# Patient Record
Sex: MALE | Race: WHITE | NOT HISPANIC OR LATINO | Employment: OTHER | URBAN - METROPOLITAN AREA
[De-identification: names, ages, dates, MRNs, and addresses within clinical notes are randomized per-mention and may not be internally consistent; named-entity substitution may affect disease eponyms.]

---

## 2018-11-01 ENCOUNTER — TRANSCRIBE ORDERS (OUTPATIENT)
Dept: ADMINISTRATIVE | Facility: HOSPITAL | Age: 58
End: 2018-11-01

## 2018-11-01 ENCOUNTER — HOSPITAL ENCOUNTER (OUTPATIENT)
Dept: GENERAL RADIOLOGY | Facility: HOSPITAL | Age: 58
Discharge: HOME | End: 2018-11-01
Attending: FAMILY MEDICINE | Admitting: FAMILY MEDICINE

## 2018-11-01 DIAGNOSIS — I25.10 DISEASE OF CARDIOVASCULAR SYSTEM: Primary | ICD-10-CM

## 2018-11-01 PROCEDURE — 71046 X-RAY EXAM CHEST 2 VIEWS: CPT

## 2020-01-03 ENCOUNTER — TELEPHONE (OUTPATIENT)
Dept: FAMILY MEDICINE CLINIC | Facility: CLINIC | Age: 60
End: 2020-01-03

## 2020-01-03 ENCOUNTER — OFFICE VISIT (OUTPATIENT)
Dept: FAMILY MEDICINE CLINIC | Facility: CLINIC | Age: 60
End: 2020-01-03

## 2020-01-03 VITALS
HEIGHT: 73 IN | WEIGHT: 285 LBS | SYSTOLIC BLOOD PRESSURE: 138 MMHG | TEMPERATURE: 97.4 F | HEART RATE: 90 BPM | DIASTOLIC BLOOD PRESSURE: 72 MMHG | RESPIRATION RATE: 16 BRPM | BODY MASS INDEX: 37.77 KG/M2 | OXYGEN SATURATION: 96 %

## 2020-01-03 DIAGNOSIS — I10 HYPERTENSION, UNSPECIFIED TYPE: ICD-10-CM

## 2020-01-03 DIAGNOSIS — E66.1 CLASS 2 DRUG-INDUCED OBESITY WITH SERIOUS COMORBIDITY AND BODY MASS INDEX (BMI) OF 37.0 TO 37.9 IN ADULT: ICD-10-CM

## 2020-01-03 DIAGNOSIS — E78.5 HYPERLIPIDEMIA, UNSPECIFIED HYPERLIPIDEMIA TYPE: ICD-10-CM

## 2020-01-03 DIAGNOSIS — N32.81 OAB (OVERACTIVE BLADDER): ICD-10-CM

## 2020-01-03 DIAGNOSIS — Z00.00 ENCOUNTER FOR MEDICAL EXAMINATION TO ESTABLISH CARE: Primary | ICD-10-CM

## 2020-01-03 DIAGNOSIS — N40.0 PROSTATE HYPERTROPHY: ICD-10-CM

## 2020-01-03 DIAGNOSIS — Z76.0 MEDICATION REFILL: ICD-10-CM

## 2020-01-03 DIAGNOSIS — E11.9 TYPE 2 DIABETES MELLITUS TREATED WITHOUT INSULIN (HCC): ICD-10-CM

## 2020-01-03 DIAGNOSIS — I51.9 HEART DISEASE: ICD-10-CM

## 2020-01-03 LAB
25(OH)D3+25(OH)D2 SERPL-MCNC: 22.2 NG/ML (ref 30–100)
ALBUMIN SERPL-MCNC: 4.9 G/DL (ref 3.5–5.2)
ALBUMIN/GLOB SERPL: 1.8 G/DL
ALP SERPL-CCNC: 80 U/L (ref 39–117)
ALT SERPL-CCNC: 64 U/L (ref 1–41)
AST SERPL-CCNC: 48 U/L (ref 1–40)
BASOPHILS # BLD AUTO: 0.06 10*3/MM3 (ref 0–0.2)
BASOPHILS NFR BLD AUTO: 1.3 % (ref 0–1.5)
BILIRUB BLD-MCNC: NEGATIVE MG/DL
BILIRUB SERPL-MCNC: 0.7 MG/DL (ref 0.2–1.2)
BUN SERPL-MCNC: 19 MG/DL (ref 8–23)
BUN/CREAT SERPL: 13.7 (ref 7–25)
CALCIUM SERPL-MCNC: 10.3 MG/DL (ref 8.6–10.5)
CHLORIDE SERPL-SCNC: 94 MMOL/L (ref 98–107)
CHOLEST SERPL-MCNC: 186 MG/DL (ref 0–200)
CLARITY, POC: CLEAR
CO2 SERPL-SCNC: 25.6 MMOL/L (ref 22–29)
COLOR UR: YELLOW
CREAT SERPL-MCNC: 1.39 MG/DL (ref 0.76–1.27)
EOSINOPHIL # BLD AUTO: 0.18 10*3/MM3 (ref 0–0.4)
EOSINOPHIL NFR BLD AUTO: 4 % (ref 0.3–6.2)
ERYTHROCYTE [DISTWIDTH] IN BLOOD BY AUTOMATED COUNT: 12.8 % (ref 12.3–15.4)
GLOBULIN SER CALC-MCNC: 2.8 GM/DL
GLUCOSE SERPL-MCNC: 164 MG/DL (ref 65–99)
GLUCOSE UR STRIP-MCNC: NEGATIVE MG/DL
HBA1C MFR BLD: 7.3 % (ref 4.8–5.6)
HCT VFR BLD AUTO: 42.7 % (ref 37.5–51)
HDLC SERPL-MCNC: 35 MG/DL (ref 40–60)
HGB BLD-MCNC: 15.4 G/DL (ref 13–17.7)
IMM GRANULOCYTES # BLD AUTO: 0.02 10*3/MM3 (ref 0–0.05)
IMM GRANULOCYTES NFR BLD AUTO: 0.4 % (ref 0–0.5)
KETONES UR QL: NEGATIVE
LDLC SERPL CALC-MCNC: 88 MG/DL (ref 0–100)
LEUKOCYTE EST, POC: NEGATIVE
LYMPHOCYTES # BLD AUTO: 0.63 10*3/MM3 (ref 0.7–3.1)
LYMPHOCYTES NFR BLD AUTO: 14 % (ref 19.6–45.3)
MCH RBC QN AUTO: 31 PG (ref 26.6–33)
MCHC RBC AUTO-ENTMCNC: 36.1 G/DL (ref 31.5–35.7)
MCV RBC AUTO: 85.9 FL (ref 79–97)
MONOCYTES # BLD AUTO: 0.43 10*3/MM3 (ref 0.1–0.9)
MONOCYTES NFR BLD AUTO: 9.5 % (ref 5–12)
NEUTROPHILS # BLD AUTO: 3.19 10*3/MM3 (ref 1.7–7)
NEUTROPHILS NFR BLD AUTO: 70.8 % (ref 42.7–76)
NITRITE UR-MCNC: NEGATIVE MG/ML
NRBC BLD AUTO-RTO: 0 /100 WBC (ref 0–0.2)
PH UR: 7 [PH] (ref 5–8)
PLATELET # BLD AUTO: 203 10*3/MM3 (ref 140–450)
POTASSIUM SERPL-SCNC: 4.3 MMOL/L (ref 3.5–5.2)
PROT SERPL-MCNC: 7.7 G/DL (ref 6–8.5)
PROT UR STRIP-MCNC: NEGATIVE MG/DL
PSA SERPL-MCNC: 2.56 NG/ML (ref 0–4)
RBC # BLD AUTO: 4.97 10*6/MM3 (ref 4.14–5.8)
RBC # UR STRIP: ABNORMAL /UL
SODIUM SERPL-SCNC: 138 MMOL/L (ref 136–145)
SP GR UR: 1.01 (ref 1–1.03)
TRIGL SERPL-MCNC: 314 MG/DL (ref 0–150)
TSH SERPL DL<=0.005 MIU/L-ACNC: 1.44 UIU/ML (ref 0.27–4.2)
UROBILINOGEN UR QL: NORMAL
VLDLC SERPL CALC-MCNC: 62.8 MG/DL
WBC # BLD AUTO: 4.51 10*3/MM3 (ref 3.4–10.8)

## 2020-01-03 PROCEDURE — 36415 COLL VENOUS BLD VENIPUNCTURE: CPT | Performed by: NURSE PRACTITIONER

## 2020-01-03 PROCEDURE — 99203 OFFICE O/P NEW LOW 30 MIN: CPT | Performed by: NURSE PRACTITIONER

## 2020-01-03 PROCEDURE — 81003 URINALYSIS AUTO W/O SCOPE: CPT | Performed by: NURSE PRACTITIONER

## 2020-01-03 RX ORDER — ATORVASTATIN CALCIUM 40 MG/1
40 TABLET, FILM COATED ORAL NIGHTLY
Qty: 90 TABLET | Refills: 2 | Status: SHIPPED | OUTPATIENT
Start: 2020-01-03 | End: 2020-06-27 | Stop reason: SDUPTHER

## 2020-01-03 RX ORDER — AMLODIPINE AND VALSARTAN 10; 320 MG/1; MG/1
1 TABLET ORAL DAILY
Qty: 90 TABLET | Refills: 2 | Status: SHIPPED | OUTPATIENT
Start: 2020-01-03 | End: 2020-04-10

## 2020-01-03 RX ORDER — TAMSULOSIN HYDROCHLORIDE 0.4 MG/1
1 CAPSULE ORAL DAILY
Qty: 90 CAPSULE | Refills: 2 | Status: SHIPPED | OUTPATIENT
Start: 2020-01-03 | End: 2022-09-14 | Stop reason: SDUPTHER

## 2020-01-03 RX ORDER — OXYBUTYNIN CHLORIDE 10 MG/1
10 TABLET, EXTENDED RELEASE ORAL DAILY
COMMUNITY
End: 2020-01-03 | Stop reason: SDUPTHER

## 2020-01-03 RX ORDER — TAMSULOSIN HYDROCHLORIDE 0.4 MG/1
CAPSULE ORAL
COMMUNITY
End: 2020-01-03 | Stop reason: SDUPTHER

## 2020-01-03 RX ORDER — OXYBUTYNIN CHLORIDE 10 MG/1
10 TABLET, EXTENDED RELEASE ORAL DAILY
Qty: 30 TABLET | Refills: 2 | Status: SHIPPED | OUTPATIENT
Start: 2020-01-03 | End: 2020-04-02

## 2020-01-03 RX ORDER — LANCETS
EACH MISCELLANEOUS
Qty: 100 EACH | Refills: 12 | Status: SHIPPED | OUTPATIENT
Start: 2020-01-03

## 2020-01-03 RX ORDER — BLOOD-GLUCOSE METER
1 KIT MISCELLANEOUS AS NEEDED
Qty: 1 EACH | Refills: 0 | Status: SHIPPED | OUTPATIENT
Start: 2020-01-03

## 2020-01-03 RX ORDER — AMLODIPINE AND VALSARTAN 10; 320 MG/1; MG/1
TABLET ORAL
COMMUNITY
Start: 2019-10-02 | End: 2020-01-03 | Stop reason: SDUPTHER

## 2020-01-03 RX ORDER — PIOGLITAZONEHYDROCHLORIDE 45 MG/1
45 TABLET ORAL DAILY
Qty: 90 TABLET | Refills: 2 | Status: SHIPPED | OUTPATIENT
Start: 2020-01-03 | End: 2020-06-27 | Stop reason: SDUPTHER

## 2020-01-03 RX ORDER — TRIAMTERENE AND HYDROCHLOROTHIAZIDE 75; 50 MG/1; MG/1
TABLET ORAL
COMMUNITY
Start: 2019-10-22 | End: 2020-06-01

## 2020-01-03 RX ORDER — CARVEDILOL 6.25 MG/1
6.25 TABLET ORAL 2 TIMES DAILY
Qty: 180 TABLET | Refills: 2 | Status: SHIPPED | OUTPATIENT
Start: 2020-01-03 | End: 2020-06-27 | Stop reason: SDUPTHER

## 2020-01-03 RX ORDER — GLIMEPIRIDE 4 MG/1
4 TABLET ORAL 2 TIMES DAILY
COMMUNITY
End: 2020-01-03 | Stop reason: SDUPTHER

## 2020-01-03 RX ORDER — CARVEDILOL 6.25 MG/1
6.25 TABLET ORAL 2 TIMES DAILY
COMMUNITY
Start: 2019-12-26 | End: 2020-01-03 | Stop reason: SDUPTHER

## 2020-01-03 RX ORDER — ATORVASTATIN CALCIUM 40 MG/1
TABLET, FILM COATED ORAL
COMMUNITY
End: 2020-01-03 | Stop reason: SDUPTHER

## 2020-01-03 RX ORDER — MULTIVITAMIN WITH IRON
TABLET ORAL
COMMUNITY
End: 2020-06-27

## 2020-01-03 RX ORDER — GLIMEPIRIDE 4 MG/1
4 TABLET ORAL 2 TIMES DAILY
Qty: 180 TABLET | Refills: 2 | Status: SHIPPED | OUTPATIENT
Start: 2020-01-03 | End: 2020-06-27 | Stop reason: SDUPTHER

## 2020-01-03 RX ORDER — GLIMEPIRIDE 4 MG/1
TABLET ORAL
COMMUNITY
End: 2020-01-03

## 2020-01-03 RX ORDER — DIMENHYDRINATE 50 MG
TABLET ORAL
COMMUNITY
End: 2021-02-24

## 2020-01-03 RX ORDER — PIOGLITAZONEHYDROCHLORIDE 45 MG/1
45 TABLET ORAL DAILY
COMMUNITY
End: 2020-01-03 | Stop reason: SDUPTHER

## 2020-01-03 RX ORDER — PIOGLITAZONEHYDROCHLORIDE 30 MG/1
TABLET ORAL
COMMUNITY
End: 2020-01-03

## 2020-01-03 NOTE — PROGRESS NOTES
"Subjective   Omar Can is a 59 y.o. male.   Chief Complaint   Patient presents with   • Establish Care     Pt is needing refills on meds. Pt is fasting for labs.       History of Present Illness   Patient is here to establish care. He is fasting for labs and is needing medication refills.  He has been seeing Dr. Horta. Will need to get records.   He has a hx of HTN, hyperlipidemia, Type 2 DM.   Obesity, sleep Apnea. Prostate hypertrophy, heart disease, OAB.   Patient reports he is taking his current medications. He is stable on his current medications.         The following portions of the patient's history were reviewed and updated as appropriate: allergies, current medications, past family history, past medical history, past social history, past surgical history and problem list.    Review of Systems   Constitutional: Negative.    HENT: Negative.         Ears ringing    Eyes: Negative.         Need to get eyes checked    Respiratory: Negative.    Cardiovascular: Positive for leg swelling.   Gastrointestinal: Negative.      Past Surgical History:   Procedure Laterality Date   • CARDIAC CATHETERIZATION     • CORONARY STENT PLACEMENT     • OTHER SURGICAL HISTORY      defibulator with pacemaker    • PACEMAKER IMPLANTATION  05/29/2019   • REPLACEMENT TOTAL KNEE Left 11/17/2018   • VESICOSTOMY     • WISDOM TOOTH EXTRACTION      3 out 4 removed.      Past Medical History:   Diagnosis Date   • Benign prostatic hyperplasia    • Colon polyps    • Coronary stent patent     x 5 stents    • Hyperlipemia    • Hypertension    • Myocardial infarction (CMS/HCC)    • Obesity    • Prostate hypertrophy    • Sleep apnea    • Type 2 diabetes mellitus (CMS/HCC)        Objective   Allergies   Allergen Reactions   • Cyclobenzaprine Hives   • Flucloxacillin Hives     Visit Vitals  /72   Pulse 90   Temp 97.4 °F (36.3 °C)   Resp 16   Ht 185.4 cm (73\")   Wt 129 kg (285 lb)   SpO2 96%   BMI 37.60 kg/m²       Physical Exam "   Constitutional: He is oriented to person, place, and time. Vital signs are normal. He appears well-developed and well-nourished.   HENT:   Head: Normocephalic.   Right Ear: External ear normal.   Left Ear: External ear normal.   Mouth/Throat: Oropharynx is clear and moist.   Eyes: Pupils are equal, round, and reactive to light.   Neck: Normal range of motion.   Cardiovascular: Normal rate, regular rhythm, normal heart sounds and intact distal pulses.   Pulmonary/Chest: Effort normal and breath sounds normal.   Abdominal: Soft.   Musculoskeletal: Normal range of motion.   Neurological: He is alert and oriented to person, place, and time.   Skin: Skin is warm, dry and intact. Capillary refill takes less than 2 seconds.   Vitals reviewed.      Assessment/Plan   Omar was seen today for establish care.    Diagnoses and all orders for this visit:    Encounter for medical examination to establish care  -     CBC & Differential  -     Comprehensive Metabolic Panel  -     Hemoglobin A1c  -     Lipid Panel  -     POC Urinalysis Dipstick, Automated  -     PSA Screen  -     TSH  -     Vitamin D 25 Hydroxy    Type 2 diabetes mellitus treated without insulin (CMS/[facility])  -     metFORMIN (GLUCOPHAGE) 850 MG tablet; Take 1/2 tablet twice daily  -     pioglitazone (ACTOS) 45 MG tablet; Take 1 tablet by mouth Daily.  -     glimepiride (AMARYL) 4 MG tablet; Take 1 tablet by mouth 2 (Two) Times a Day.  -     glucose blood test strip; Use as instructed  -     Lancets (ONETOUCH ULTRASOFT) lancets; Check daily and prn  -     glucose monitor monitoring kit; 1 each As Needed (for diabetes).  -     Ambulatory Referral for Diabetic Eye Exam-Ophthalmology    Hyperlipidemia, unspecified hyperlipidemia type  -     atorvastatin (LIPITOR) 40 MG tablet; Take 1 tablet by mouth Every Night.    Hypertension, unspecified type  -     carvedilol (COREG) 6.25 MG tablet; Take 1 tablet by mouth 2 (Two) Times a Day.  -     amLODIPine-valsartan (EXFORGE)   MG per tablet; Take 1 tablet by mouth Daily.    Prostate hypertrophy  -     tamsulosin (FLOMAX) 0.4 MG capsule 24 hr capsule; Take 1 capsule by mouth Daily.    Class 2 drug-induced obesity with serious comorbidity and body mass index (BMI) of 37.0 to 37.9 in adult    Heart disease    OAB (overactive bladder)  -     oxybutynin XL (DITROPAN-XL) 10 MG 24 hr tablet; Take 1 tablet by mouth Daily for 90 days.    Medication refill  -     carvedilol (COREG) 6.25 MG tablet; Take 1 tablet by mouth 2 (Two) Times a Day.  -     amLODIPine-valsartan (EXFORGE)  MG per tablet; Take 1 tablet by mouth Daily.  -     metFORMIN (GLUCOPHAGE) 850 MG tablet; Take 1/2 tablet twice daily  -     atorvastatin (LIPITOR) 40 MG tablet; Take 1 tablet by mouth Every Night.  -     pioglitazone (ACTOS) 45 MG tablet; Take 1 tablet by mouth Daily.  -     tamsulosin (FLOMAX) 0.4 MG capsule 24 hr capsule; Take 1 capsule by mouth Daily.  -     glimepiride (AMARYL) 4 MG tablet; Take 1 tablet by mouth 2 (Two) Times a Day.  -     oxybutynin XL (DITROPAN-XL) 10 MG 24 hr tablet; Take 1 tablet by mouth Daily for 90 days.  -     glucose blood test strip; Use as instructed  -     Lancets (ONETOUCH ULTRASOFT) lancets; Check daily and prn      Patient Counseling:  --Nutrition: Stressed importance of moderation in sodium/caffeine intake, saturated fat and cholesterol, caloric balance, sufficient intake of fresh fruits, vegetables, fiber, calcium--Exercise: Stressed the importance of regular exercise.   --Substance Abuse: Discussed cessation/primary prevention of tobacco, alcohol, or other drug use; driving or other dangerous activities under the influence; availability of treatment for abuse.    --Sexuality: Discussed sexually transmitted diseases, partner selection, use of condoms, avoidance of unintended pregnancy and contraceptive alternatives.   --Injury prevention: Discussed safety belts, safety helmets, smoke detector, smoking near bedding or  upholstery.   --Dental health: Discussed importance of regular tooth brushing, flossing, and dental visits.  --Immunizations reviewed. He declined influenza   --Discussed benefits of screening colonoscopy.  --After hours’ service discussed with patient    Follow up in 4 weeks and as needed.          Patient Instructions   Calorie Counting for Weight Loss  Calories are units of energy. Your body needs a certain amount of calories from food to keep you going throughout the day. When you eat more calories than your body needs, your body stores the extra calories as fat. When you eat fewer calories than your body needs, your body burns fat to get the energy it needs.  Calorie counting means keeping track of how many calories you eat and drink each day. Calorie counting can be helpful if you need to lose weight. If you make sure to eat fewer calories than your body needs, you should lose weight. Ask your health care provider what a healthy weight is for you.  For calorie counting to work, you will need to eat the right number of calories in a day in order to lose a healthy amount of weight per week. A dietitian can help you determine how many calories you need in a day and will give you suggestions on how to reach your calorie goal.  · A healthy amount of weight to lose per week is usually 1-2 lb (0.5-0.9 kg). This usually means that your daily calorie intake should be reduced by 500-750 calories.  · Eating 1,200 - 1,500 calories per day can help most women lose weight.  · Eating 1,500 - 1,800 calories per day can help most men lose weight.  What is my plan?  My goal is to have __________ calories per day.  If I have this many calories per day, I should lose around __________ pounds per week.  What do I need to know about calorie counting?  In order to meet your daily calorie goal, you will need to:  · Find out how many calories are in each food you would like to eat. Try to do this before you eat.  · Decide how much of  the food you plan to eat.  · Write down what you ate and how many calories it had. Doing this is called keeping a food log.  To successfully lose weight, it is important to balance calorie counting with a healthy lifestyle that includes regular activity. Aim for 150 minutes of moderate exercise (such as walking) or 75 minutes of vigorous exercise (such as running) each week.  Where do I find calorie information?    The number of calories in a food can be found on a Nutrition Facts label. If a food does not have a Nutrition Facts label, try to look up the calories online or ask your dietitian for help.  Remember that calories are listed per serving. If you choose to have more than one serving of a food, you will have to multiply the calories per serving by the amount of servings you plan to eat. For example, the label on a package of bread might say that a serving size is 1 slice and that there are 90 calories in a serving. If you eat 1 slice, you will have eaten 90 calories. If you eat 2 slices, you will have eaten 180 calories.  How do I keep a food log?  Immediately after each meal, record the following information in your food log:  · What you ate. Don't forget to include toppings, sauces, and other extras on the food.  · How much you ate. This can be measured in cups, ounces, or number of items.  · How many calories each food and drink had.  · The total number of calories in the meal.  Keep your food log near you, such as in a small notebook in your pocket, or use a mobile evelyn or website. Some programs will calculate calories for you and show you how many calories you have left for the day to meet your goal.  What are some calorie counting tips?    · Use your calories on foods and drinks that will fill you up and not leave you hungry:  ? Some examples of foods that fill you up are nuts and nut butters, vegetables, lean proteins, and high-fiber foods like whole grains. High-fiber foods are foods with more than 5  "g fiber per serving.  ? Drinks such as sodas, specialty coffee drinks, alcohol, and juices have a lot of calories, yet do not fill you up.  · Eat nutritious foods and avoid empty calories. Empty calories are calories you get from foods or beverages that do not have many vitamins or protein, such as candy, sweets, and soda. It is better to have a nutritious high-calorie food (such as an avocado) than a food with few nutrients (such as a bag of chips).  · Know how many calories are in the foods you eat most often. This will help you calculate calorie counts faster.  · Pay attention to calories in drinks. Low-calorie drinks include water and unsweetened drinks.  · Pay attention to nutrition labels for \"low fat\" or \"fat free\" foods. These foods sometimes have the same amount of calories or more calories than the full fat versions. They also often have added sugar, starch, or salt, to make up for flavor that was removed with the fat.  · Find a way of tracking calories that works for you. Get creative. Try different apps or programs if writing down calories does not work for you.  What are some portion control tips?  · Know how many calories are in a serving. This will help you know how many servings of a certain food you can have.  · Use a measuring cup to measure serving sizes. You could also try weighing out portions on a kitchen scale. With time, you will be able to estimate serving sizes for some foods.  · Take some time to put servings of different foods on your favorite plates, bowls, and cups so you know what a serving looks like.  · Try not to eat straight from a bag or box. Doing this can lead to overeating. Put the amount you would like to eat in a cup or on a plate to make sure you are eating the right portion.  · Use smaller plates, glasses, and bowls to prevent overeating.  · Try not to multitask (for example, watch TV or use your computer) while eating. If it is time to eat, sit down at a table and enjoy " "your food. This will help you to know when you are full. It will also help you to be aware of what you are eating and how much you are eating.  What are tips for following this plan?  Reading food labels  · Check the calorie count compared to the serving size. The serving size may be smaller than what you are used to eating.  · Check the source of the calories. Make sure the food you are eating is high in vitamins and protein and low in saturated and trans fats.  Shopping  · Read nutrition labels while you shop. This will help you make healthy decisions before you decide to purchase your food.  · Make a grocery list and stick to it.  Cooking  · Try to cook your favorite foods in a healthier way. For example, try baking instead of frying.  · Use low-fat dairy products.  Meal planning  · Use more fruits and vegetables. Half of your plate should be fruits and vegetables.  · Include lean proteins like poultry and fish.  How do I count calories when eating out?  · Ask for smaller portion sizes.  · Consider sharing an entree and sides instead of getting your own entree.  · If you get your own entree, eat only half. Ask for a box at the beginning of your meal and put the rest of your entree in it so you are not tempted to eat it.  · If calories are listed on the menu, choose the lower calorie options.  · Choose dishes that include vegetables, fruits, whole grains, low-fat dairy products, and lean protein.  · Choose items that are boiled, broiled, grilled, or steamed. Stay away from items that are buttered, battered, fried, or served with cream sauce. Items labeled \"crispy\" are usually fried, unless stated otherwise.  · Choose water, low-fat milk, unsweetened iced tea, or other drinks without added sugar. If you want an alcoholic beverage, choose a lower calorie option such as a glass of wine or light beer.  · Ask for dressings, sauces, and syrups on the side. These are usually high in calories, so you should limit the " amount you eat.  · If you want a salad, choose a garden salad and ask for grilled meats. Avoid extra toppings like zimmer, cheese, or fried items. Ask for the dressing on the side, or ask for olive oil and vinegar or lemon to use as dressing.  · Estimate how many servings of a food you are given. For example, a serving of cooked rice is ½ cup or about the size of half a baseball. Knowing serving sizes will help you be aware of how much food you are eating at restaurants. The list below tells you how big or small some common portion sizes are based on everyday objects:  ? 1 oz--4 stacked dice.  ? 3 oz--1 deck of cards.  ? 1 tsp--1 die.  ? 1 Tbsp--½ a ping-pong ball.  ? 2 Tbsp--1 ping-pong ball.  ? ½ cup--½ baseball.  ? 1 cup--1 baseball.  Summary  · Calorie counting means keeping track of how many calories you eat and drink each day. If you eat fewer calories than your body needs, you should lose weight.  · A healthy amount of weight to lose per week is usually 1-2 lb (0.5-0.9 kg). This usually means reducing your daily calorie intake by 500-750 calories.  · The number of calories in a food can be found on a Nutrition Facts label. If a food does not have a Nutrition Facts label, try to look up the calories online or ask your dietitian for help.  · Use your calories on foods and drinks that will fill you up, and not on foods and drinks that will leave you hungry.  · Use smaller plates, glasses, and bowls to prevent overeating.  This information is not intended to replace advice given to you by your health care provider. Make sure you discuss any questions you have with your health care provider.  Document Released: 12/18/2006 Document Revised: 09/06/2019 Document Reviewed: 11/17/2017  [employer] Interactive Patient Education © 2019 [employer]    High Triglycerides Eating Plan  Triglycerides are a type of fat in the blood. High levels of triglycerides can increase your risk of heart disease and stroke. If your  triglyceride levels are high, choosing the right foods can help lower your triglycerides and keep your heart healthy. Work with your health care provider or a diet and nutrition specialist (dietitian) to develop an eating plan that is right for you.  What are tips for following this plan?  General guidelines    · Lose weight, if you are overweight. For most people, losing 5-10 lbs (2-5 kg) helps lower triglyceride levels. A weight-loss plan may include.  ? 30 minutes of exercise at least 5 days a week.  ? Reducing the amount of calories, sugar, and fat you eat.  · Eat a wide variety of fresh fruits, vegetables, and whole grains. These foods are high in fiber.  · Eat foods that contain healthy fats, such as fatty fish, nuts, seeds, and olive oil.  · Avoid foods that are high in added sugar, added salt (sodium), saturated fat, and trans fat.  · Avoid low-fiber, refined carbohydrates such as white bread, crackers, noodles, and white rice.  · Avoid foods with partially hydrogenated oils (trans fats), such as fried foods or stick margarine.  · Limit alcohol intake to no more than 1 drink a day for nonpregnant women and 2 drinks a day for men. One drink equals 12 oz of beer, 5 oz of wine, or 1½ oz of hard liquor. Your health care provider may recommend that you drink less depending on your overall health.  Reading food labels  · Check food labels for the amount of saturated fat. Choose foods with no or very little saturated fat.  · Check food labels for the amount of trans fat. Choose foods with no trans fat.  · Check food labels for the amount of cholesterol. Choose foods low in cholesterol. Ask your dietitian how much cholesterol you should have each day.  · Check food labels for the amount of sodium. Choose foods with less than 140 milligrams (mg) per serving.  Shopping  · Buy dairy products labeled as nonfat (skim) or low-fat (1%).  · Avoid buying processed or prepackaged foods. These are often high in added sugar,  sodium, and fat.  Cooking  · Choose healthy fats when cooking, such as olive oil or canola oil.  · Cook foods using lower fat methods, such as baking, broiling, boiling, or grilling.  · Make your own sauces, dressings, and marinades when possible, instead of buying them. Store-bought sauces, dressings, and marinades are often high in sodium and sugar.  Meal planning  · Eat more home-cooked food and less restaurant, buffet, and fast food.  · Eat fatty fish at least 2 times each week. Examples of fatty fish include salmon, trout, mackerel, tuna, and herring.  · If you eat whole eggs, do not eat more than 3 egg yolks per week.  What foods are recommended?  The items listed may not be a complete list. Talk with your dietitian about what dietary choices are best for you.  Grains  Whole wheat or whole grain breads, crackers, cereals, and pasta. Unsweetened oatmeal. Bulgur. Barley. Quinoa. Brown rice. Whole wheat flour tortillas.  Vegetables  Fresh or frozen vegetables. Low-sodium canned vegetables.  Fruits  All fresh, canned (in natural juice), or frozen fruits.  Meats and other protein foods  Skinless chicken or turkey. Ground chicken or turkey. Lean cuts of pork, trimmed of fat. Fish and seafood, especially salmon, trout, and herring. Egg whites. Dried beans, peas, or lentils. Unsalted nuts or seeds. Unsalted canned beans. Natural peanut or almond butter.  Dairy  Low-fat dairy products. Skim or low-fat (1%) milk. Reduced fat (2%) and low-sodium cheese. Low-fat ricotta cheese. Low-fat cottage cheese. Plain, low-fat yogurt.  Fats and oils  Tub margarine without trans fats. Light or reduced-fat mayonnaise. Light or reduced-fat salad dressings. Avocado. Safflower, olive, sunflower, soybean, and canola oils.  What foods are not recommended?  The items listed may not be a complete list. Talk with your dietitian about what dietary choices are best for you.  Grains  White bread. White (regular) pasta. White rice. Cornbread.  Bagels. Pastries. Crackers that contain trans fat.  Vegetables  Creamed or fried vegetables. Vegetables in a cheese sauce.  Fruits  Sweetened dried fruit. Canned fruit in syrup. Fruit juice.  Meats and other protein foods  Fatty cuts of meat. Ribs. Chicken wings. Miller. Sausage. Bologna. Salami. Chitterlings. Fatback. Hot dogs. Bratwurst. Packaged lunch meats.  Dairy  Whole or reduced-fat (2%) milk. Half-and-half. Cream cheese. Full-fat or sweetened yogurt. Full-fat cheese. Nondairy creamers. Whipped toppings. Processed cheese or cheese spreads. Cheese curds.  Beverages  Alcohol. Sweetened drinks, such as soda, lemonade, fruit drinks, or punches.  Fats and oils  Butter. Stick margarine. Lard. Shortening. Ghee. Millre fat. Tropical oils, such as coconut, palm kernel, or palm oils.  Sweets and desserts  Corn syrup. Sugars. Honey. Molasses. Candy. Jam and jelly. Syrup. Sweetened cereals. Cookies. Pies. Cakes. Donuts. Muffins. Ice cream.  Condiments  Store-bought sauces, dressings, and marinades that are high in sugar, such as ketchup and barbecue sauce.  Summary  · High levels of triglycerides can increase the risk of heart disease and stroke. Choosing the right foods can help lower your triglycerides.  · Eat plenty of fresh fruits, vegetables, and whole grains. Choose low-fat dairy and lean meats. Eat fatty fish at least twice a week.  · Avoid processed and prepackaged foods with added sugar, sodium, saturated fat, and trans fat.  · If you need suggestions or have questions about what types of food are good for you, talk with your health care provider or a dietitian.  This information is not intended to replace advice given to you by your health care provider. Make sure you discuss any questions you have with your health care provider.  Document Released: 10/05/2005 Document Revised: 02/20/2018 Document Reviewed: 02/20/2018  [employer] Interactive Patient Education © 2019 [employer]    Hypertension  Hypertension is  another name for high blood pressure. High blood pressure forces your heart to work harder to pump blood. This can cause problems over time.  There are two numbers in a blood pressure reading. There is a top number (systolic) over a bottom number (diastolic). It is best to have a blood pressure below 120/80. Healthy choices can help lower your blood pressure. You may need medicine to help lower your blood pressure if:  · Your blood pressure cannot be lowered with healthy choices.  · Your blood pressure is higher than 130/80.  Follow these instructions at home:  Eating and drinking    · If directed, follow the DASH eating plan. This diet includes:  ? Filling half of your plate at each meal with fruits and vegetables.  ? Filling one quarter of your plate at each meal with whole grains. Whole grains include whole wheat pasta, brown rice, and whole grain bread.  ? Eating or drinking low-fat dairy products, such as skim milk or low-fat yogurt.  ? Filling one quarter of your plate at each meal with low-fat (lean) proteins. Low-fat proteins include fish, skinless chicken, eggs, beans, and tofu.  ? Avoiding fatty meat, cured and processed meat, or chicken with skin.  ? Avoiding premade or processed food.  · Eat less than 1,500 mg of salt (sodium) a day.  · Limit alcohol use to no more than 1 drink a day for nonpregnant women and 2 drinks a day for men. One drink equals 12 oz of beer, 5 oz of wine, or 1½ oz of hard liquor.  Lifestyle  · Work with your doctor to stay at a healthy weight or to lose weight. Ask your doctor what the best weight is for you.  · Get at least 30 minutes of exercise that causes your heart to beat faster (aerobic exercise) most days of the week. This may include walking, swimming, or biking.  · Get at least 30 minutes of exercise that strengthens your muscles (resistance exercise) at least 3 days a week. This may include lifting weights or pilates.  · Do not use any products that contain nicotine or  tobacco. This includes cigarettes and e-cigarettes. If you need help quitting, ask your doctor.  · Check your blood pressure at home as told by your doctor.  · Keep all follow-up visits as told by your doctor. This is important.  Medicines  · Take over-the-counter and prescription medicines only as told by your doctor. Follow directions carefully.  · Do not skip doses of blood pressure medicine. The medicine does not work as well if you skip doses. Skipping doses also puts you at risk for problems.  · Ask your doctor about side effects or reactions to medicines that you should watch for.  Contact a doctor if:  · You think you are having a reaction to the medicine you are taking.  · You have headaches that keep coming back (recurring).  · You feel dizzy.  · You have swelling in your ankles.  · You have trouble with your vision.  Get help right away if:  · You get a very bad headache.  · You start to feel confused.  · You feel weak or numb.  · You feel faint.  · You get very bad pain in your:  ? Chest.  ? Belly (abdomen).  · You throw up (vomit) more than once.  · You have trouble breathing.  Summary  · Hypertension is another name for high blood pressure.  · Making healthy choices can help lower blood pressure. If your blood pressure cannot be controlled with healthy choices, you may need to take medicine.  This information is not intended to replace advice given to you by your health care provider. Make sure you discuss any questions you have with your health care provider.  Document Released: 06/05/2009 Document Revised: 11/15/2017 Document Reviewed: 11/15/2017  [employer] Interactive Patient Education © 2019 [employer].     Answers for HPI/ROS submitted by the patient on 1/1/2020   How long have you been having these symptoms?: Other      Cristina Davis, APRN

## 2020-01-03 NOTE — TELEPHONE ENCOUNTER
[facility] called to request clarification on directions as there aren't any on the glucose monitor monitoring kit Rx.    [facility] Callback # [phone]

## 2020-01-03 NOTE — PATIENT INSTRUCTIONS
Calorie Counting for Weight Loss  Calories are units of energy. Your body needs a certain amount of calories from food to keep you going throughout the day. When you eat more calories than your body needs, your body stores the extra calories as fat. When you eat fewer calories than your body needs, your body burns fat to get the energy it needs.  Calorie counting means keeping track of how many calories you eat and drink each day. Calorie counting can be helpful if you need to lose weight. If you make sure to eat fewer calories than your body needs, you should lose weight. Ask your health care provider what a healthy weight is for you.  For calorie counting to work, you will need to eat the right number of calories in a day in order to lose a healthy amount of weight per week. A dietitian can help you determine how many calories you need in a day and will give you suggestions on how to reach your calorie goal.  · A healthy amount of weight to lose per week is usually 1-2 lb (0.5-0.9 kg). This usually means that your daily calorie intake should be reduced by 500-750 calories.  · Eating 1,200 - 1,500 calories per day can help most women lose weight.  · Eating 1,500 - 1,800 calories per day can help most men lose weight.  What is my plan?  My goal is to have __________ calories per day.  If I have this many calories per day, I should lose around __________ pounds per week.  What do I need to know about calorie counting?  In order to meet your daily calorie goal, you will need to:  · Find out how many calories are in each food you would like to eat. Try to do this before you eat.  · Decide how much of the food you plan to eat.  · Write down what you ate and how many calories it had. Doing this is called keeping a food log.  To successfully lose weight, it is important to balance calorie counting with a healthy lifestyle that includes regular activity. Aim for 150 minutes of moderate exercise (such as walking) or 75  minutes of vigorous exercise (such as running) each week.  Where do I find calorie information?    The number of calories in a food can be found on a Nutrition Facts label. If a food does not have a Nutrition Facts label, try to look up the calories online or ask your dietitian for help.  Remember that calories are listed per serving. If you choose to have more than one serving of a food, you will have to multiply the calories per serving by the amount of servings you plan to eat. For example, the label on a package of bread might say that a serving size is 1 slice and that there are 90 calories in a serving. If you eat 1 slice, you will have eaten 90 calories. If you eat 2 slices, you will have eaten 180 calories.  How do I keep a food log?  Immediately after each meal, record the following information in your food log:  · What you ate. Don't forget to include toppings, sauces, and other extras on the food.  · How much you ate. This can be measured in cups, ounces, or number of items.  · How many calories each food and drink had.  · The total number of calories in the meal.  Keep your food log near you, such as in a small notebook in your pocket, or use a mobile evelyn or website. Some programs will calculate calories for you and show you how many calories you have left for the day to meet your goal.  What are some calorie counting tips?    · Use your calories on foods and drinks that will fill you up and not leave you hungry:  ? Some examples of foods that fill you up are nuts and nut butters, vegetables, lean proteins, and high-fiber foods like whole grains. High-fiber foods are foods with more than 5 g fiber per serving.  ? Drinks such as sodas, specialty coffee drinks, alcohol, and juices have a lot of calories, yet do not fill you up.  · Eat nutritious foods and avoid empty calories. Empty calories are calories you get from foods or beverages that do not have many vitamins or protein, such as candy, sweets, and  "soda. It is better to have a nutritious high-calorie food (such as an avocado) than a food with few nutrients (such as a bag of chips).  · Know how many calories are in the foods you eat most often. This will help you calculate calorie counts faster.  · Pay attention to calories in drinks. Low-calorie drinks include water and unsweetened drinks.  · Pay attention to nutrition labels for \"low fat\" or \"fat free\" foods. These foods sometimes have the same amount of calories or more calories than the full fat versions. They also often have added sugar, starch, or salt, to make up for flavor that was removed with the fat.  · Find a way of tracking calories that works for you. Get creative. Try different apps or programs if writing down calories does not work for you.  What are some portion control tips?  · Know how many calories are in a serving. This will help you know how many servings of a certain food you can have.  · Use a measuring cup to measure serving sizes. You could also try weighing out portions on a kitchen scale. With time, you will be able to estimate serving sizes for some foods.  · Take some time to put servings of different foods on your favorite plates, bowls, and cups so you know what a serving looks like.  · Try not to eat straight from a bag or box. Doing this can lead to overeating. Put the amount you would like to eat in a cup or on a plate to make sure you are eating the right portion.  · Use smaller plates, glasses, and bowls to prevent overeating.  · Try not to multitask (for example, watch TV or use your computer) while eating. If it is time to eat, sit down at a table and enjoy your food. This will help you to know when you are full. It will also help you to be aware of what you are eating and how much you are eating.  What are tips for following this plan?  Reading food labels  · Check the calorie count compared to the serving size. The serving size may be smaller than what you are used to " "eating.  · Check the source of the calories. Make sure the food you are eating is high in vitamins and protein and low in saturated and trans fats.  Shopping  · Read nutrition labels while you shop. This will help you make healthy decisions before you decide to purchase your food.  · Make a grocery list and stick to it.  Cooking  · Try to cook your favorite foods in a healthier way. For example, try baking instead of frying.  · Use low-fat dairy products.  Meal planning  · Use more fruits and vegetables. Half of your plate should be fruits and vegetables.  · Include lean proteins like poultry and fish.  How do I count calories when eating out?  · Ask for smaller portion sizes.  · Consider sharing an entree and sides instead of getting your own entree.  · If you get your own entree, eat only half. Ask for a box at the beginning of your meal and put the rest of your entree in it so you are not tempted to eat it.  · If calories are listed on the menu, choose the lower calorie options.  · Choose dishes that include vegetables, fruits, whole grains, low-fat dairy products, and lean protein.  · Choose items that are boiled, broiled, grilled, or steamed. Stay away from items that are buttered, battered, fried, or served with cream sauce. Items labeled \"crispy\" are usually fried, unless stated otherwise.  · Choose water, low-fat milk, unsweetened iced tea, or other drinks without added sugar. If you want an alcoholic beverage, choose a lower calorie option such as a glass of wine or light beer.  · Ask for dressings, sauces, and syrups on the side. These are usually high in calories, so you should limit the amount you eat.  · If you want a salad, choose a garden salad and ask for grilled meats. Avoid extra toppings like zimmer, cheese, or fried items. Ask for the dressing on the side, or ask for olive oil and vinegar or lemon to use as dressing.  · Estimate how many servings of a food you are given. For example, a serving of " cooked rice is ½ cup or about the size of half a baseball. Knowing serving sizes will help you be aware of how much food you are eating at restaurants. The list below tells you how big or small some common portion sizes are based on everyday objects:  ? 1 oz--4 stacked dice.  ? 3 oz--1 deck of cards.  ? 1 tsp--1 die.  ? 1 Tbsp--½ a ping-pong ball.  ? 2 Tbsp--1 ping-pong ball.  ? ½ cup--½ baseball.  ? 1 cup--1 baseball.  Summary  · Calorie counting means keeping track of how many calories you eat and drink each day. If you eat fewer calories than your body needs, you should lose weight.  · A healthy amount of weight to lose per week is usually 1-2 lb (0.5-0.9 kg). This usually means reducing your daily calorie intake by 500-750 calories.  · The number of calories in a food can be found on a Nutrition Facts label. If a food does not have a Nutrition Facts label, try to look up the calories online or ask your dietitian for help.  · Use your calories on foods and drinks that will fill you up, and not on foods and drinks that will leave you hungry.  · Use smaller plates, glasses, and bowls to prevent overeating.  This information is not intended to replace advice given to you by your health care provider. Make sure you discuss any questions you have with your health care provider.  Document Released: 12/18/2006 Document Revised: 09/06/2019 Document Reviewed: 11/17/2017  [employer] Interactive Patient Education © 2019 [employer]    High Triglycerides Eating Plan  Triglycerides are a type of fat in the blood. High levels of triglycerides can increase your risk of heart disease and stroke. If your triglyceride levels are high, choosing the right foods can help lower your triglycerides and keep your heart healthy. Work with your health care provider or a diet and nutrition specialist (dietitian) to develop an eating plan that is right for you.  What are tips for following this plan?  General guidelines    · Lose weight, if  you are overweight. For most people, losing 5-10 lbs (2-5 kg) helps lower triglyceride levels. A weight-loss plan may include.  ? 30 minutes of exercise at least 5 days a week.  ? Reducing the amount of calories, sugar, and fat you eat.  · Eat a wide variety of fresh fruits, vegetables, and whole grains. These foods are high in fiber.  · Eat foods that contain healthy fats, such as fatty fish, nuts, seeds, and olive oil.  · Avoid foods that are high in added sugar, added salt (sodium), saturated fat, and trans fat.  · Avoid low-fiber, refined carbohydrates such as white bread, crackers, noodles, and white rice.  · Avoid foods with partially hydrogenated oils (trans fats), such as fried foods or stick margarine.  · Limit alcohol intake to no more than 1 drink a day for nonpregnant women and 2 drinks a day for men. One drink equals 12 oz of beer, 5 oz of wine, or 1½ oz of hard liquor. Your health care provider may recommend that you drink less depending on your overall health.  Reading food labels  · Check food labels for the amount of saturated fat. Choose foods with no or very little saturated fat.  · Check food labels for the amount of trans fat. Choose foods with no trans fat.  · Check food labels for the amount of cholesterol. Choose foods low in cholesterol. Ask your dietitian how much cholesterol you should have each day.  · Check food labels for the amount of sodium. Choose foods with less than 140 milligrams (mg) per serving.  Shopping  · Buy dairy products labeled as nonfat (skim) or low-fat (1%).  · Avoid buying processed or prepackaged foods. These are often high in added sugar, sodium, and fat.  Cooking  · Choose healthy fats when cooking, such as olive oil or canola oil.  · Cook foods using lower fat methods, such as baking, broiling, boiling, or grilling.  · Make your own sauces, dressings, and marinades when possible, instead of buying them. Store-bought sauces, dressings, and marinades are often high  in sodium and sugar.  Meal planning  · Eat more home-cooked food and less restaurant, buffet, and fast food.  · Eat fatty fish at least 2 times each week. Examples of fatty fish include salmon, trout, mackerel, tuna, and herring.  · If you eat whole eggs, do not eat more than 3 egg yolks per week.  What foods are recommended?  The items listed may not be a complete list. Talk with your dietitian about what dietary choices are best for you.  Grains  Whole wheat or whole grain breads, crackers, cereals, and pasta. Unsweetened oatmeal. Bulgur. Barley. Quinoa. Brown rice. Whole wheat flour tortillas.  Vegetables  Fresh or frozen vegetables. Low-sodium canned vegetables.  Fruits  All fresh, canned (in natural juice), or frozen fruits.  Meats and other protein foods  Skinless chicken or turkey. Ground chicken or turkey. Lean cuts of pork, trimmed of fat. Fish and seafood, especially salmon, trout, and herring. Egg whites. Dried beans, peas, or lentils. Unsalted nuts or seeds. Unsalted canned beans. Natural peanut or almond butter.  Dairy  Low-fat dairy products. Skim or low-fat (1%) milk. Reduced fat (2%) and low-sodium cheese. Low-fat ricotta cheese. Low-fat cottage cheese. Plain, low-fat yogurt.  Fats and oils  Tub margarine without trans fats. Light or reduced-fat mayonnaise. Light or reduced-fat salad dressings. Avocado. Safflower, olive, sunflower, soybean, and canola oils.  What foods are not recommended?  The items listed may not be a complete list. Talk with your dietitian about what dietary choices are best for you.  Grains  White bread. White (regular) pasta. White rice. Cornbread. Bagels. Pastries. Crackers that contain trans fat.  Vegetables  Creamed or fried vegetables. Vegetables in a cheese sauce.  Fruits  Sweetened dried fruit. Canned fruit in syrup. Fruit juice.  Meats and other protein foods  Fatty cuts of meat. Ribs. Chicken wings. Miller. Sausage. Bologna. Salami. Chitterlings. Fatback. Hot dogs.  [place]urst. Packaged lunch meats.  Dairy  Whole or reduced-fat (2%) milk. Half-and-half. Cream cheese. Full-fat or sweetened yogurt. Full-fat cheese. Nondairy creamers. Whipped toppings. Processed cheese or cheese spreads. Cheese curds.  Beverages  Alcohol. Sweetened drinks, such as soda, lemonade, fruit drinks, or punches.  Fats and oils  Butter. Stick margarine. Lard. Shortening. Ghee. Miller fat. Tropical oils, such as coconut, palm kernel, or palm oils.  Sweets and desserts  Corn syrup. Sugars. Honey. Molasses. Candy. Jam and jelly. Syrup. Sweetened cereals. Cookies. Pies. Cakes. Donuts. Muffins. Ice cream.  Condiments  Store-bought sauces, dressings, and marinades that are high in sugar, such as ketchup and barbecue sauce.  Summary  · High levels of triglycerides can increase the risk of heart disease and stroke. Choosing the right foods can help lower your triglycerides.  · Eat plenty of fresh fruits, vegetables, and whole grains. Choose low-fat dairy and lean meats. Eat fatty fish at least twice a week.  · Avoid processed and prepackaged foods with added sugar, sodium, saturated fat, and trans fat.  · If you need suggestions or have questions about what types of food are good for you, talk with your health care provider or a dietitian.  This information is not intended to replace advice given to you by your health care provider. Make sure you discuss any questions you have with your health care provider.  Document Released: 10/05/2005 Document Revised: 02/20/2018 Document Reviewed: 02/20/2018  [employer] Interactive Patient Education © 2019 [employer]    Hypertension  Hypertension is another name for high blood pressure. High blood pressure forces your heart to work harder to pump blood. This can cause problems over time.  There are two numbers in a blood pressure reading. There is a top number (systolic) over a bottom number (diastolic). It is best to have a blood pressure below 120/80. Healthy choices can  help lower your blood pressure. You may need medicine to help lower your blood pressure if:  · Your blood pressure cannot be lowered with healthy choices.  · Your blood pressure is higher than 130/80.  Follow these instructions at home:  Eating and drinking    · If directed, follow the DASH eating plan. This diet includes:  ? Filling half of your plate at each meal with fruits and vegetables.  ? Filling one quarter of your plate at each meal with whole grains. Whole grains include whole wheat pasta, brown rice, and whole grain bread.  ? Eating or drinking low-fat dairy products, such as skim milk or low-fat yogurt.  ? Filling one quarter of your plate at each meal with low-fat (lean) proteins. Low-fat proteins include fish, skinless chicken, eggs, beans, and tofu.  ? Avoiding fatty meat, cured and processed meat, or chicken with skin.  ? Avoiding premade or processed food.  · Eat less than 1,500 mg of salt (sodium) a day.  · Limit alcohol use to no more than 1 drink a day for nonpregnant women and 2 drinks a day for men. One drink equals 12 oz of beer, 5 oz of wine, or 1½ oz of hard liquor.  Lifestyle  · Work with your doctor to stay at a healthy weight or to lose weight. Ask your doctor what the best weight is for you.  · Get at least 30 minutes of exercise that causes your heart to beat faster (aerobic exercise) most days of the week. This may include walking, swimming, or biking.  · Get at least 30 minutes of exercise that strengthens your muscles (resistance exercise) at least 3 days a week. This may include lifting weights or pilates.  · Do not use any products that contain nicotine or tobacco. This includes cigarettes and e-cigarettes. If you need help quitting, ask your doctor.  · Check your blood pressure at home as told by your doctor.  · Keep all follow-up visits as told by your doctor. This is important.  Medicines  · Take over-the-counter and prescription medicines only as told by your doctor. Follow  directions carefully.  · Do not skip doses of blood pressure medicine. The medicine does not work as well if you skip doses. Skipping doses also puts you at risk for problems.  · Ask your doctor about side effects or reactions to medicines that you should watch for.  Contact a doctor if:  · You think you are having a reaction to the medicine you are taking.  · You have headaches that keep coming back (recurring).  · You feel dizzy.  · You have swelling in your ankles.  · You have trouble with your vision.  Get help right away if:  · You get a very bad headache.  · You start to feel confused.  · You feel weak or numb.  · You feel faint.  · You get very bad pain in your:  ? Chest.  ? Belly (abdomen).  · You throw up (vomit) more than once.  · You have trouble breathing.  Summary  · Hypertension is another name for high blood pressure.  · Making healthy choices can help lower blood pressure. If your blood pressure cannot be controlled with healthy choices, you may need to take medicine.  This information is not intended to replace advice given to you by your health care provider. Make sure you discuss any questions you have with your health care provider.  Document Released: 06/05/2009 Document Revised: 11/15/2017 Document Reviewed: 11/15/2017  [employer] Interactive Patient Education © 2019 [employer].

## 2020-01-24 ENCOUNTER — TELEPHONE (OUTPATIENT)
Dept: FAMILY MEDICINE CLINIC | Facility: CLINIC | Age: 60
End: 2020-01-24

## 2020-01-24 NOTE — TELEPHONE ENCOUNTER
Pharmacy called needing to get clarification on the frequency of the glucose blood test strip.    [facility] [address]

## 2020-04-10 ENCOUNTER — TELEPHONE (OUTPATIENT)
Dept: FAMILY MEDICINE CLINIC | Facility: CLINIC | Age: 60
End: 2020-04-10

## 2020-04-10 DIAGNOSIS — I10 ESSENTIAL HYPERTENSION: Primary | ICD-10-CM

## 2020-04-10 RX ORDER — VALSARTAN 320 MG/1
320 TABLET ORAL DAILY
Qty: 90 TABLET | Refills: 1 | Status: SHIPPED | OUTPATIENT
Start: 2020-04-10 | End: 2020-06-01

## 2020-04-10 RX ORDER — AMLODIPINE BESYLATE 10 MG/1
10 TABLET ORAL DAILY
Qty: 90 TABLET | Refills: 1 | Status: SHIPPED | OUTPATIENT
Start: 2020-04-10 | End: 2020-06-01

## 2020-04-10 NOTE — TELEPHONE ENCOUNTER
Called patient discussed his medication. He received a letter from his insurance company they were not longer going to cover the Exforge medication.   He is agreeable to try to spilt them up and reorder as two individual medications   Will stop the Exforge.   Will order amlodipine 10 mg daily   And valsartan 320 mg daily   Follow up as planned.     BRIDGET Argueta         ----- Message from Lizy Ramirez MA sent at 4/9/2020  8:51 AM EDT -----  Regarding: FW: amLODIPine-valsartan (EXFORGE)  MG refill need new medication type.    Contact: [phone]      ----- Message -----  From: Jacey Lincoln  Sent: 4/9/2020   8:20 AM EDT  To: Nick Schmidt [facility]  Subject: amLODIPine-valsartan (EXFORGE)  MG ref#    Patient said that his insurance does not pay for amLODIPine-valsartan (EXFORGE)  MG any more and would need a similar medication and has to be changed. Please call and discuss the medication change.       Thank you

## 2020-05-29 DIAGNOSIS — I10 HYPERTENSION, UNSPECIFIED TYPE: ICD-10-CM

## 2020-05-29 DIAGNOSIS — Z76.0 MEDICATION REFILL: ICD-10-CM

## 2020-06-01 DIAGNOSIS — Z76.0 MEDICATION REFILL: ICD-10-CM

## 2020-06-01 DIAGNOSIS — I10 HYPERTENSION, UNSPECIFIED TYPE: ICD-10-CM

## 2020-06-01 DIAGNOSIS — I10 ESSENTIAL HYPERTENSION: ICD-10-CM

## 2020-06-01 RX ORDER — CHLORAL HYDRATE 500 MG
CAPSULE ORAL
COMMUNITY
End: 2020-06-27

## 2020-06-01 RX ORDER — TRIAMTERENE AND HYDROCHLOROTHIAZIDE 75; 50 MG/1; MG/1
1 TABLET ORAL DAILY
Qty: 30 TABLET | Refills: 0 | Status: SHIPPED | OUTPATIENT
Start: 2020-06-01 | End: 2020-06-27 | Stop reason: SDUPTHER

## 2020-06-01 NOTE — PROGRESS NOTES
Reviewed medication refill for maxide 75-50 mg po 1 daily.   Patient new for this provider 01/03/20. Refilled patient's medications per his bottle he brought into the office. After receiving the med request for the maxide - after much reviewing and calling pharmacy. Will stop exforge - and restart patients maxide.   Checked Dr. Damon and Dr. Horta's notes.     Need patient to follow up in 1 month.     BRIDGET Argueta  Attempted to call patient. No answer.

## 2020-06-27 ENCOUNTER — OFFICE VISIT (OUTPATIENT)
Dept: FAMILY MEDICINE CLINIC | Facility: CLINIC | Age: 60
End: 2020-06-27

## 2020-06-27 ENCOUNTER — LAB REQUISITION (OUTPATIENT)
Dept: LAB | Facility: HOSPITAL | Age: 60
End: 2020-06-27

## 2020-06-27 VITALS
WEIGHT: 291.4 LBS | HEART RATE: 80 BPM | TEMPERATURE: 98.2 F | BODY MASS INDEX: 38.62 KG/M2 | RESPIRATION RATE: 16 BRPM | DIASTOLIC BLOOD PRESSURE: 76 MMHG | OXYGEN SATURATION: 96 % | SYSTOLIC BLOOD PRESSURE: 136 MMHG | HEIGHT: 73 IN

## 2020-06-27 DIAGNOSIS — Z76.0 MEDICATION REFILL: ICD-10-CM

## 2020-06-27 DIAGNOSIS — E78.5 HYPERLIPIDEMIA, UNSPECIFIED HYPERLIPIDEMIA TYPE: ICD-10-CM

## 2020-06-27 DIAGNOSIS — N18.9 CHRONIC RENAL IMPAIRMENT, UNSPECIFIED CKD STAGE: ICD-10-CM

## 2020-06-27 DIAGNOSIS — I10 ESSENTIAL HYPERTENSION: ICD-10-CM

## 2020-06-27 DIAGNOSIS — E11.9 TYPE 2 DIABETES MELLITUS TREATED WITHOUT INSULIN (HCC): Primary | ICD-10-CM

## 2020-06-27 DIAGNOSIS — Z00.00 ROUTINE GENERAL MEDICAL EXAMINATION AT A HEALTH CARE FACILITY: ICD-10-CM

## 2020-06-27 LAB
ALBUMIN SERPL-MCNC: 4.9 G/DL (ref 3.5–5.2)
ALBUMIN/GLOB SERPL: 2 G/DL
ALP SERPL-CCNC: 70 U/L (ref 39–117)
ALT SERPL W P-5'-P-CCNC: 66 U/L (ref 1–41)
ANION GAP SERPL CALCULATED.3IONS-SCNC: 15 MMOL/L (ref 5–15)
AST SERPL-CCNC: 57 U/L (ref 1–40)
BASOPHILS # BLD AUTO: 0.03 10*3/MM3 (ref 0–0.2)
BASOPHILS NFR BLD AUTO: 0.8 % (ref 0–1.5)
BILIRUB BLD-MCNC: NEGATIVE MG/DL
BILIRUB SERPL-MCNC: 0.6 MG/DL (ref 0.2–1.2)
BUN BLD-MCNC: 19 MG/DL (ref 8–23)
BUN/CREAT SERPL: 13.2 (ref 7–25)
CALCIUM SPEC-SCNC: 10.7 MG/DL (ref 8.6–10.5)
CHLORIDE SERPL-SCNC: 96 MMOL/L (ref 98–107)
CHOLEST SERPL-MCNC: 172 MG/DL (ref 0–200)
CLARITY, POC: CLEAR
CO2 SERPL-SCNC: 28 MMOL/L (ref 22–29)
COLOR UR: YELLOW
CREAT BLD-MCNC: 1.44 MG/DL (ref 0.76–1.27)
DEPRECATED RDW RBC AUTO: 42.6 FL (ref 37–54)
EOSINOPHIL # BLD AUTO: 0.15 10*3/MM3 (ref 0–0.4)
EOSINOPHIL NFR BLD AUTO: 3.9 % (ref 0.3–6.2)
ERYTHROCYTE [DISTWIDTH] IN BLOOD BY AUTOMATED COUNT: 12.6 % (ref 12.3–15.4)
GFR SERPL CREATININE-BSD FRML MDRD: 50 ML/MIN/1.73
GLOBULIN UR ELPH-MCNC: 2.4 GM/DL
GLUCOSE BLD-MCNC: 154 MG/DL (ref 65–99)
GLUCOSE UR STRIP-MCNC: NEGATIVE MG/DL
HBA1C MFR BLD: 7.8 %
HCT VFR BLD AUTO: 45.2 % (ref 37.5–51)
HDLC SERPL-MCNC: 33 MG/DL (ref 40–60)
HGB BLD-MCNC: 14.7 G/DL (ref 13–17.7)
IMM GRANULOCYTES # BLD AUTO: 0.01 10*3/MM3 (ref 0–0.05)
IMM GRANULOCYTES NFR BLD AUTO: 0.3 % (ref 0–0.5)
KETONES UR QL: NEGATIVE
LDLC SERPL CALC-MCNC: 85 MG/DL (ref 0–100)
LDLC/HDLC SERPL: 2.56 {RATIO}
LEUKOCYTE EST, POC: NEGATIVE
LYMPHOCYTES # BLD AUTO: 0.68 10*3/MM3 (ref 0.7–3.1)
LYMPHOCYTES NFR BLD AUTO: 17.5 % (ref 19.6–45.3)
MCH RBC QN AUTO: 29.9 PG (ref 26.6–33)
MCHC RBC AUTO-ENTMCNC: 32.5 G/DL (ref 31.5–35.7)
MCV RBC AUTO: 92.1 FL (ref 79–97)
MONOCYTES # BLD AUTO: 0.37 10*3/MM3 (ref 0.1–0.9)
MONOCYTES NFR BLD AUTO: 9.5 % (ref 5–12)
NEUTROPHILS # BLD AUTO: 2.65 10*3/MM3 (ref 1.7–7)
NEUTROPHILS NFR BLD AUTO: 68 % (ref 42.7–76)
NITRITE UR-MCNC: NEGATIVE MG/ML
NRBC BLD AUTO-RTO: 0 /100 WBC (ref 0–0.2)
PH UR: 7 [PH] (ref 5–8)
PLATELET # BLD AUTO: 212 10*3/MM3 (ref 140–450)
PMV BLD AUTO: 11.4 FL (ref 6–12)
POC CREATININE URINE: 100
POC MICROALBUMIN URINE: 10
POTASSIUM BLD-SCNC: 4.2 MMOL/L (ref 3.5–5.2)
PROT SERPL-MCNC: 7.3 G/DL (ref 6–8.5)
PROT UR STRIP-MCNC: NEGATIVE MG/DL
RBC # BLD AUTO: 4.91 10*6/MM3 (ref 4.14–5.8)
RBC # UR STRIP: NEGATIVE /UL
SODIUM BLD-SCNC: 139 MMOL/L (ref 136–145)
SP GR UR: 1.01 (ref 1–1.03)
TRIGL SERPL-MCNC: 272 MG/DL (ref 0–150)
TSH SERPL DL<=0.05 MIU/L-ACNC: 1.32 UIU/ML (ref 0.27–4.2)
UROBILINOGEN UR QL: NORMAL
VLDLC SERPL-MCNC: 54.4 MG/DL
WBC NRBC COR # BLD: 3.89 10*3/MM3 (ref 3.4–10.8)

## 2020-06-27 PROCEDURE — 84443 ASSAY THYROID STIM HORMONE: CPT | Performed by: NURSE PRACTITIONER

## 2020-06-27 PROCEDURE — 80053 COMPREHEN METABOLIC PANEL: CPT | Performed by: NURSE PRACTITIONER

## 2020-06-27 PROCEDURE — 80061 LIPID PANEL: CPT | Performed by: NURSE PRACTITIONER

## 2020-06-27 PROCEDURE — 82044 UR ALBUMIN SEMIQUANTITATIVE: CPT | Performed by: NURSE PRACTITIONER

## 2020-06-27 PROCEDURE — 83036 HEMOGLOBIN GLYCOSYLATED A1C: CPT | Performed by: NURSE PRACTITIONER

## 2020-06-27 PROCEDURE — 99214 OFFICE O/P EST MOD 30 MIN: CPT | Performed by: NURSE PRACTITIONER

## 2020-06-27 PROCEDURE — 85025 COMPLETE CBC W/AUTO DIFF WBC: CPT | Performed by: NURSE PRACTITIONER

## 2020-06-27 RX ORDER — NITROGLYCERIN 0.3 MG/1
0.3 TABLET SUBLINGUAL
COMMUNITY
End: 2022-07-12 | Stop reason: SDUPTHER

## 2020-06-27 RX ORDER — GLIMEPIRIDE 4 MG/1
4 TABLET ORAL 2 TIMES DAILY
Qty: 180 TABLET | Refills: 2 | Status: SHIPPED | OUTPATIENT
Start: 2020-06-27 | End: 2021-08-16 | Stop reason: SDUPTHER

## 2020-06-27 RX ORDER — TRIAMTERENE AND HYDROCHLOROTHIAZIDE 75; 50 MG/1; MG/1
1 TABLET ORAL DAILY
Qty: 30 TABLET | Refills: 0 | Status: SHIPPED | OUTPATIENT
Start: 2020-06-27 | End: 2020-07-23

## 2020-06-27 RX ORDER — CARVEDILOL 6.25 MG/1
6.25 TABLET ORAL 2 TIMES DAILY
Qty: 180 TABLET | Refills: 2 | Status: SHIPPED | OUTPATIENT
Start: 2020-06-27 | End: 2021-07-28

## 2020-06-27 RX ORDER — PIOGLITAZONEHYDROCHLORIDE 45 MG/1
45 TABLET ORAL DAILY
Qty: 90 TABLET | Refills: 2 | Status: SHIPPED | OUTPATIENT
Start: 2020-06-27 | End: 2021-02-24

## 2020-06-27 RX ORDER — ATORVASTATIN CALCIUM 40 MG/1
40 TABLET, FILM COATED ORAL NIGHTLY
Qty: 90 TABLET | Refills: 2 | Status: SHIPPED | OUTPATIENT
Start: 2020-06-27 | End: 2021-08-16 | Stop reason: SDUPTHER

## 2020-06-28 PROBLEM — Z95.810 BIVENTRICULAR IMPLANTABLE CARDIOVERTER-DEFIBRILLATOR (ICD) IN SITU: Status: ACTIVE | Noted: 2020-06-28

## 2020-06-28 PROBLEM — N40.0 PROSTATE ENLARGEMENT: Status: ACTIVE | Noted: 2020-06-28

## 2020-06-28 PROBLEM — E13.9 DIABETES 1.5, MANAGED AS TYPE 2 (HCC): Status: ACTIVE | Noted: 2020-06-28

## 2020-06-28 PROBLEM — N28.9 RENAL INSUFFICIENCY: Status: ACTIVE | Noted: 2020-06-28

## 2020-06-28 PROBLEM — I25.10 CAD (CORONARY ARTERY DISEASE): Status: ACTIVE | Noted: 2020-06-28

## 2020-06-28 PROBLEM — I44.0 ATRIOVENTRICULAR BLOCK, FIRST DEGREE: Status: ACTIVE | Noted: 2020-06-28

## 2020-06-28 PROBLEM — G47.33 OBSTRUCTIVE SLEEP APNEA SYNDROME: Status: ACTIVE | Noted: 2019-01-15

## 2020-06-28 PROBLEM — E75.5 CHOLESTANOL STORAGE DISEASE: Status: ACTIVE | Noted: 2020-06-28

## 2020-06-28 NOTE — PROGRESS NOTES
Subjective   Omar Can is a 60 y.o. male.     Mr. Can presents today for medication refills. Patient has a history of DM, HTN and HLD which have been stable on current medications. He continues to follow with cardiology (Dr. Damon) at [facility] for management CAD, HTN, 1st degree AV block, implanted defibrillator monitoring. Patient follows with Dr. Ponce ([facility]) for management BPH. Patient has had elevated creatinine and decreased GFR in the past, but does not currently see a nephrologist. He has been unable to take dose Metformin higher than 1/2 of 850 mg BID due to worsening of creatinine levels.   Diabetes   He presents for his follow-up diabetic visit. Diabetes type: 1.5. No MedicAlert identification noted. His disease course has been stable. There are no diabetic associated symptoms. Risk factors for coronary artery disease include dyslipidemia, male sex and family history.       The following portions of the patient's history were reviewed and updated as appropriate: allergies, current medications, past family history, past medical history, past social history, past surgical history and problem list.    Allergies as of 06/27/2020 - Reviewed 06/27/2020   Allergen Reaction Noted   • Cyclobenzaprine Hives 05/26/2010   • Flucloxacillin Hives 01/03/2020       Current Outpatient Medications on File Prior to Visit   Medication Sig   • aspirin 81 MG tablet aspirin 81 mg tablet   Take 1 tablet every day by oral route.   • Coenzyme Q10 (CO Q-10) 100 MG capsule Co Q-10 100 mg capsule   Take 2 capsules every day by oral route.   • glucose monitor monitoring kit 1 each As Needed (for diabetes).   • Lancets (ONETOUCH ULTRASOFT) lancets Check daily and prn   • Multiple Vitamin (MULTI-VITAMIN DAILY PO) Multi Vitamin   qd   • nitroglycerin (Nitrostat) 0.3 MG SL tablet Place 0.3 mg under the tongue Every 5 (Five) Minutes As Needed for Chest Pain. Take no more than 3 doses in 15 minutes.    • tamsulosin (FLOMAX) 0.4 MG capsule 24 hr capsule Take 1 capsule by mouth Daily.     No current facility-administered medications on file prior to visit.        Review of Systems   Constitutional: Negative.    Respiratory: Negative.    Cardiovascular: Negative.    Gastrointestinal: Negative.    Neurological: Negative.        Objective   Physical Exam   Constitutional: He is oriented to person, place, and time. Vital signs are normal. He appears well-developed and well-nourished. He is cooperative.  Non-toxic appearance. He does not have a sickly appearance. He does not appear ill. No distress.   Cardiovascular: Normal rate, regular rhythm and normal heart sounds.   Pulmonary/Chest: Effort normal and breath sounds normal.     Vascular Status -  His right foot exhibits no edema. His left foot exhibits no edema.  Neurological: He is alert and oriented to person, place, and time.   Skin: Skin is warm and dry. He is not diaphoretic.   Psychiatric: He has a normal mood and affect. His behavior is normal. Judgment and thought content normal.   Vitals reviewed.    Vitals:    06/27/20 1031   BP: 136/76   Pulse: 80   Resp: 16   Temp: 98.2 °F (36.8 °C)   SpO2: 96%     Body mass index is 38.45 kg/m².    Assessment/Plan   Omar was seen today for med refill.    Diagnoses and all orders for this visit:    Type 2 diabetes mellitus treated without insulin (CMS/[facility])  -     POC Glycosylated Hemoglobin (Hb A1C)  *     POC HbA1C 7.8 (increased from 7.3 on 1/3/20)  -     POC Urinalysis Dipstick, Automated  -     glucose blood test strip; Use as instructed  -     glimepiride (AMARYL) 4 MG tablet; Take 1 tablet by mouth 2 (Two) Times a Day.  -     metFORMIN (GLUCOPHAGE) 850 MG tablet; Take 1/2 tablet twice daily  -     pioglitazone (ACTOS) 45 MG tablet; Take 1 tablet by mouth Daily.  -     POCT microalbumin    Brief Urine Lab Results  (Last result in the past 365 days)      Color   Clarity   Blood   Leuk Est   Nitrite   Protein    CREAT   Urine HCG        06/27/20 1118             100       06/27/20 1118 Yellow Clear Negative Negative Negative Negative             Essential hypertension  -     Comprehensive Metabolic Panel  -     TSH  -     CBC Auto Differential  -     carvedilol (COREG) 6.25 MG tablet; Take 1 tablet by mouth 2 (Two) Times a Day.  -     triamterene-hydrochlorothiazide (MAXZIDE) 75-50 MG per tablet; Take 1 tablet by mouth Daily.    Hyperlipidemia, unspecified hyperlipidemia type  -     Lipid Panel  -     atorvastatin (LIPITOR) 40 MG tablet; Take 1 tablet by mouth Every Night.    Chronic renal impairment        -     Referral to nephrology    Medication refill  -     glucose blood test strip; Use as instructed  -     glimepiride (AMARYL) 4 MG tablet; Take 1 tablet by mouth 2 (Two) Times a Day.  -     carvedilol (COREG) 6.25 MG tablet; Take 1 tablet by mouth 2 (Two) Times a Day.  -     atorvastatin (LIPITOR) 40 MG tablet; Take 1 tablet by mouth Every Night.  -     metFORMIN (GLUCOPHAGE) 850 MG tablet; Take 1/2 tablet twice daily  -     pioglitazone (ACTOS) 45 MG tablet; Take 1 tablet by mouth Daily.  -     triamterene-hydrochlorothiazide (MAXZIDE) 75-50 MG per tablet; Take 1 tablet by mouth Daily.    Discussed the nature of the medical condition(s) risks, complications, implications, management, safe and proper use of medications. Encouraged medication compliance, and keeping scheduled follow up appointments with me and any other providers.      Laboratory testing ordered today. Further recommendations after lab evaluation.

## 2020-06-28 NOTE — PATIENT INSTRUCTIONS
Chronic Kidney Disease, Adult  Chronic kidney disease (CKD) occurs when the kidneys become damaged slowly over a long period of time. The kidneys are a pair of organs that do many important jobs in the body, including:  · Removing waste and extra fluid from the blood to make urine.  · Making hormones that maintain the amount of fluid in tissues and blood vessels.  · Maintaining the right amount of fluids and chemicals in the body.  A small amount of kidney damage may not cause problems, but a large amount of damage may make it hard or impossible for the kidneys to work the way they should. If steps are not taken to slow down kidney damage or to stop it from getting worse, the kidneys may stop working permanently (end-stage renal disease or ESRD). Most of the time, CKD does not go away, but it can often be controlled. People who have CKD are usually able to live normal lives.  What are the causes?  The most common causes of this condition are diabetes and high blood pressure (hypertension). Other causes include:  · Heart and blood vessel (cardiovascular) disease.  · Kidney diseases, such as:  ? Glomerulonephritis.  ? Interstitial nephritis.  ? Polycystic kidney disease.  ? Renal vascular disease.  · Diseases that affect the immune system.  · Genetic diseases.  · Medicines that damage the kidneys, such as anti-inflammatory medicines.  · Being around or being in contact with poisonous (toxic) substances.  · A kidney or urinary infection that occurs again and again (recurs).  · Vasculitis. This is swelling or inflammation of the blood vessels.  · A problem with urine flow that may be caused by:  ? Cancer.  ? Having kidney stones more than one time.  ? An enlarged prostate, in males.  What increases the risk?  You are more likely to develop this condition if you:  · Are older than age 60.  · Are female.  · Are -American, , , , or .  · Are a current or former  smoker.  · Are obese.  · Have a family history of kidney disease or failure.  · Often take medicines that are damaging to the kidneys.  What are the signs or symptoms?  Symptoms of this condition include:  · Swelling (edema) of the face, legs, ankles, or feet.  · Tiredness (lethargy) and having less energy.  · Nausea or vomiting.  · Confusion or trouble concentrating.  · Problems with urination, such as:  ? Painful or burning feeling during urination.  ? Decreased urine production.  ? Frequent urination, especially at night.  ? Bloody urine.  · Muscle twitches and cramps, especially in the legs.  · Shortness of breath.  · Weakness.  · Loss of appetite.  · Metallic taste in the mouth.  · Trouble sleeping.  · Dry, itchy skin.  · A low blood count (anemia).  · Pale lining of the eyelids and surface of the eye (conjunctiva).  Symptoms develop slowly and may not be obvious until the kidney damage becomes severe. It is possible to have kidney disease for years without having any symptoms.  How is this diagnosed?  This condition may be diagnosed based on:  · Blood tests.  · Urine tests.  · Imaging tests, such as an ultrasound or CT scan.  · A test in which a sample of tissue is removed from the kidneys to be examined under a microscope (kidney biopsy).  These test results will help your health care provider determine how serious the CKD is.  How is this treated?  There is no cure for most cases of this condition, but treatment usually relieves symptoms and prevents or slows the progression of the disease. Treatment may include:  · Making diet changes, which may require you to avoid alcohol, salty foods (sodium), and foods that are high in potassium, calcium, and protein.  · Medicines:  ? To lower blood pressure.  ? To control blood glucose.  ? To relieve anemia.  ? To relieve swelling.  ? To protect your bones.  ? To improve the balance of electrolytes in your blood.  · Removing toxic waste from the body through types of  dialysis, if the kidneys can no longer do their job (kidney failure).  · Managing any other conditions that are causing your CKD or making it worse.  Follow these instructions at home:  Medicines  · Take over-the-counter and prescription medicines only as told by your health care provider. The dose of some medicines that you take may need to be adjusted.  · Do not take any new medicines unless approved by your health care provider. Many medicines can worsen your kidney damage.  · Do not take any vitamin and mineral supplements unless approved by your health care provider. Many nutritional supplements can worsen your kidney damage.  General instructions  · Follow your prescribed diet as told by your health care provider.  · Do not use any products that contain nicotine or tobacco, such as cigarettes and e-cigarettes. If you need help quitting, ask your health care provider.  · Monitor and track your blood pressure at home. Report changes in your blood pressure as told by your health care provider.  · If you are being treated for diabetes, monitor and track your blood sugar (blood glucose) levels as told by your health care provider.  · Maintain a healthy weight. If you need help with this, ask your health care provider.  · Start or continue an exercise plan. Exercise at least 30 minutes a day, 5 days a week.  · Keep your immunizations up to date as told by your health care provider.  · Keep all follow-up visits as told by your health care provider. This is important.  Where to find more information  · American Association of Kidney Patients: www.aakp.org  · National Kidney Foundation: www.kidney.org  · [employer] Kid[employer]: www.akfinc.org  · [facility]: www.lifeoptions.org and www.kidneyschool.org  Contact a health care provider if:  · Your symptoms get worse.  · You develop new symptoms.  Get help right away if:  · You develop symptoms of ESRD, which include:  ? Headaches.  ? Numbness in the  hands or feet.  ? Easy bruising.  ? Frequent hiccups.  ? Chest pain.  ? Shortness of breath.  ? Lack of menstruation, in women.  · You have a fever.  · You have decreased urine production.  · You have pain or bleeding when you urinate.  Summary  · Chronic kidney disease (CKD) occurs when the kidneys become damaged slowly over a long period of time.  · The most common causes of this condition are diabetes and high blood pressure (hypertension).  · There is no cure for most cases of this condition, but treatment usually relieves symptoms and prevents or slows the progression of the disease. Treatment may include a combination of medicines and lifestyle changes.  This information is not intended to replace advice given to you by your health care provider. Make sure you discuss any questions you have with your health care provider.  Document Released: 09/26/2009 Document Revised: 11/30/2018 Document Reviewed: 01/25/2018  [employer] Patient Education © 2020 [employer]    Diabetes Mellitus and Standards of Medical Care  Managing diabetes (diabetes mellitus) can be complicated. Your diabetes treatment may be managed by a team of health care providers, including:  · A physician who specializes in diabetes (endocrinologist).  · A nurse practitioner or physician assistant.  · Nurses.  · A diet and nutrition specialist (registered dietitian).  · A certified diabetes educator (CDE).  · An exercise specialist.  · A pharmacist.  · An eye doctor.  · A foot specialist (podiatrist).  · A dentist.  · A primary care provider.  · A mental health provider.  Your health care providers follow guidelines to help you get the best quality of care. The following schedule is a general guideline for your diabetes management plan. Your health care providers may give you more specific instructions.  Physical exams  Upon being diagnosed with diabetes mellitus, and each year after that, your health care provider will ask about your medical and  family history. He or she will also do a physical exam. Your exam may include:  · Measuring your height, weight, and body mass index (BMI).  · Checking your blood pressure. This will be done at every routine medical visit. Your target blood pressure may vary depending on your medical conditions, your age, and other factors.  · Thyroid gland exam.  · Skin exam.  · Screening for damage to your nerves (peripheral neuropathy). This may include checking the pulse in your legs and feet and checking the level of sensation in your hands and feet.  · A complete foot exam to inspect the structure and skin of your feet, including checking for cuts, bruises, redness, blisters, sores, or other problems.  · Screening for blood vessel (vascular) problems, which may include checking the pulse in your legs and feet and checking your temperature.  Blood tests  Depending on your treatment plan and your personal needs, you may have the following tests done:  · HbA1c (hemoglobin A1c). This test provides information about blood sugar (glucose) control over the previous 2-3 months. It is used to adjust your treatment plan, if needed. This test will be done:  ? At least 2 times a year, if you are meeting your treatment goals.  ? 4 times a year, if you are not meeting your treatment goals or if treatment goals have changed.  · Lipid testing, including total, LDL, and HDL cholesterol and triglyceride levels.  ? The goal for LDL is less than 100 mg/dL (5.5 mmol/L). If you are at high risk for complications, the goal is less than 70 mg/dL (3.9 mmol/L).  ? The goal for HDL is 40 mg/dL (2.2 mmol/L) or higher for men and 50 mg/dL (2.8 mmol/L) or higher for women. An HDL cholesterol of 60 mg/dL (3.3 mmol/L) or higher gives some protection against heart disease.  ? The goal for triglycerides is less than 150 mg/dL (8.3 mmol/L).  · Liver function tests.  · Kidney function tests.  · Thyroid function tests.  Dental and eye exams  · Visit your dentist  two times a year.  · If you have type 1 diabetes, your health care provider may recommend an eye exam 3-5 years after you are diagnosed, and then once a year after your first exam.  ? For children with type 1 diabetes, a health care provider may recommend an eye exam when your child is age 10 or older and has had diabetes for 3-5 years. After the first exam, your child should get an eye exam once a year.  · If you have type 2 diabetes, your health care provider may recommend an eye exam as soon as you are diagnosed, and then once a year after your first exam.  Immunizations    · The yearly flu (influenza) vaccine is recommended for everyone 6 months or older who has diabetes.  · The pneumonia (pneumococcal) vaccine is recommended for everyone 2 years or older who has diabetes. If you are 65 or older, you may get the pneumonia vaccine as a series of two separate shots.  · The hepatitis B vaccine is recommended for adults shortly after being diagnosed with diabetes.  · Adults and children with diabetes should receive all other vaccines according to age-specific recommendations from the Centers for Disease Control and Prevention (CDC).  Mental and emotional health  Screening for symptoms of eating disorders, anxiety, and depression is recommended at the time of diagnosis and afterward as needed. If your screening shows that you have symptoms (positive screening result), you may need more evaluation and you may work with a mental health care provider.  Treatment plan  Your treatment plan will be reviewed at every medical visit. You and your health care provider will discuss:  · How you are taking your medicines, including insulin.  · Any side effects you are experiencing.  · Your blood glucose target goals.  · The frequency of your blood glucose monitoring.  · Lifestyle habits, such as activity level as well as tobacco, alcohol, and substance use.  Diabetes self-management education  Your health care provider will  "assess how well you are monitoring your blood glucose levels and whether you are taking your insulin correctly. He or she may refer you to:  · A certified diabetes educator to manage your diabetes throughout your life, starting at diagnosis.  · A registered dietitian who can create or review your personal nutrition plan.  · An exercise specialist who can discuss your activity level and exercise plan.  Summary  · Managing diabetes (diabetes mellitus) can be complicated. Your diabetes treatment may be managed by a team of health care providers.  · Your health care providers follow guidelines in order to help you get the best quality of care.  · Standards of care including having regular physical exams, blood tests, blood pressure monitoring, immunizations, screening tests, and education about how to manage your diabetes.  · Your health care providers may also give you more specific instructions based on your individual health.  This information is not intended to replace advice given to you by your health care provider. Make sure you discuss any questions you have with your health care provider.  Document Released: 10/15/2010 Document Revised: 09/06/2019 Document Reviewed: 09/15/2017  [employer] Patient Education © 2020 [employer]    Managing Your Hypertension  Hypertension is commonly called high blood pressure. This is when the force of your blood pressing against the walls of your arteries is too strong. Arteries are blood vessels that carry blood from your heart throughout your body. Hypertension forces the heart to work harder to pump blood, and may cause the arteries to become narrow or stiff. Having untreated or uncontrolled hypertension can cause heart attack, stroke, kidney disease, and other problems.  What are blood pressure readings?  A blood pressure reading consists of a higher number over a lower number. Ideally, your blood pressure should be below 120/80. The first (\"top\") number is called the " "systolic pressure. It is a measure of the pressure in your arteries as your heart beats. The second (\"bottom\") number is called the diastolic pressure. It is a measure of the pressure in your arteries as the heart relaxes.  What does my blood pressure reading mean?  Blood pressure is classified into four stages. Based on your blood pressure reading, your health care provider may use the following stages to determine what type of treatment you need, if any. Systolic pressure and diastolic pressure are measured in a unit called mm Hg.  Normal  · Systolic pressure: below 120.  · Diastolic pressure: below 80.  Elevated  · Systolic pressure: 120-129.  · Diastolic pressure: below 80.  Hypertension stage 1  · Systolic pressure: 130-139.  · Diastolic pressure: 80-89.  Hypertension stage 2  · Systolic pressure: 140 or above.  · Diastolic pressure: 90 or above.  What health risks are associated with hypertension?  Managing your hypertension is an important responsibility. Uncontrolled hypertension can lead to:  · A heart attack.  · A stroke.  · A weakened blood vessel (aneurysm).  · Heart failure.  · Kidney damage.  · Eye damage.  · Metabolic syndrome.  · Memory and concentration problems.  What changes can I make to manage my hypertension?  Hypertension can be managed by making lifestyle changes and possibly by taking medicines. Your health care provider will help you make a plan to bring your blood pressure within a normal range.  Eating and drinking    · Eat a diet that is high in fiber and potassium, and low in salt (sodium), added sugar, and fat. An example eating plan is called the DASH (Dietary Approaches to Stop Hypertension) diet. To eat this way:  ? Eat plenty of fresh fruits and vegetables. Try to fill half of your plate at each meal with fruits and vegetables.  ? Eat whole grains, such as whole wheat pasta, brown rice, or whole grain bread. Fill about one quarter of your plate with whole grains.  ? Eat low-fat " diary products.  ? Avoid fatty cuts of meat, processed or cured meats, and poultry with skin. Fill about one quarter of your plate with lean proteins such as fish, chicken without skin, beans, eggs, and tofu.  ? Avoid premade and processed foods. These tend to be higher in sodium, added sugar, and fat.  · Reduce your daily sodium intake. Most people with hypertension should eat less than 1,500 mg of sodium a day.  · Limit alcohol intake to no more than 1 drink a day for nonpregnant women and 2 drinks a day for men. One drink equals 12 oz of beer, 5 oz of wine, or 1½ oz of hard liquor.  Lifestyle  · Work with your health care provider to maintain a healthy body weight, or to lose weight. Ask what an ideal weight is for you.  · Get at least 30 minutes of exercise that causes your heart to beat faster (aerobic exercise) most days of the week. Activities may include walking, swimming, or biking.  · Include exercise to strengthen your muscles (resistance exercise), such as weight lifting, as part of your weekly exercise routine. Try to do these types of exercises for 30 minutes at least 3 days a week.  · Do not use any products that contain nicotine or tobacco, such as cigarettes and e-cigarettes. If you need help quitting, ask your health care provider.  · Control any long-term (chronic) conditions you have, such as high cholesterol or diabetes.  Monitoring  · Monitor your blood pressure at home as told by your health care provider. Your personal target blood pressure may vary depending on your medical conditions, your age, and other factors.  · Have your blood pressure checked regularly, as often as told by your health care provider.  Working with your health care provider  · Review all the medicines you take with your health care provider because there may be side effects or interactions.  · Talk with your health care provider about your diet, exercise habits, and other lifestyle factors that may be contributing to  hypertension.  · Visit your health care provider regularly. Your health care provider can help you create and adjust your plan for managing hypertension.  Will I need medicine to control my blood pressure?  Your health care provider may prescribe medicine if lifestyle changes are not enough to get your blood pressure under control, and if:  · Your systolic blood pressure is 130 or higher.  · Your diastolic blood pressure is 80 or higher.  Take medicines only as told by your health care provider. Follow the directions carefully. Blood pressure medicines must be taken as prescribed. The medicine does not work as well when you skip doses. Skipping doses also puts you at risk for problems.  Contact a health care provider if:  · You think you are having a reaction to medicines you have taken.  · You have repeated (recurrent) headaches.  · You feel dizzy.  · You have swelling in your ankles.  · You have trouble with your vision.  Get help right away if:  · You develop a severe headache or confusion.  · You have unusual weakness or numbness, or you feel faint.  · You have severe pain in your chest or abdomen.  · You vomit repeatedly.  · You have trouble breathing.  Summary  · Hypertension is when the force of blood pumping through your arteries is too strong. If this condition is not controlled, it may put you at risk for serious complications.  · Your personal target blood pressure may vary depending on your medical conditions, your age, and other factors. For most people, a normal blood pressure is less than 120/80.  · Hypertension is managed by lifestyle changes, medicines, or both. Lifestyle changes include weight loss, eating a healthy, low-sodium diet, exercising more, and limiting alcohol.  This information is not intended to replace advice given to you by your health care provider. Make sure you discuss any questions you have with your health care provider.  Document Released: 09/11/2013 Document Revised:  04/10/2020 Document Reviewed: 11/15/2017  [employer] Patient Education © 2020 [employer]    Preventing Diabetes Mellitus Complications  You can take action to prevent or slow down problems that are caused by diabetes (diabetes mellitus). Following your diabetes plan and taking care of yourself can reduce your risk of serious or life-threatening complications.  What actions can I take to prevent diabetes complications?  Manage your diabetes    · Follow instructions from your health care providers about managing your diabetes. Your diabetes may be managed by a team of health care providers who can teach you how to care for yourself and can answer questions that you have.  · Educate yourself about your condition so you can make healthy choices about eating and physical activity.  · Check your blood sugar (glucose) levels as often as directed. Your health care provider will help you decide how often to check your blood glucose level depending on your treatment goals and how well you are meeting them.  · Ask your health care provider if you should take low-dose aspirin daily and what dose is recommended for you. Taking low-dose aspirin daily is recommended to help prevent cardiovascular disease.  Do not use nicotine or tobacco  Do not use any products that contain nicotine or tobacco, such as cigarettes and e-cigarettes. If you need help quitting, ask your health care provider. Nicotine raises your risk for diabetes problems. If you quit using nicotine:  · You will lower your risk for heart attack, stroke, nerve disease, and kidney disease.  · Your cholesterol and blood pressure may improve.  · Your blood circulation will improve.  Keep your blood pressure under control  Your personal target blood pressure is determined based on:  · Your age.  · Your medicines.  · How long you have had diabetes.  · Any other medical conditions you have.  To control your blood pressure:  · Follow instructions from your health care  provider about meal planning, exercise, and medicines.  · Make sure your health care provider checks your blood pressure at every medical visit.  · Monitor your blood pressure at home as told by your health care provider.    Keep your cholesterol under control  To control your cholesterol:  · Follow instructions from your health care provider about meal planning, exercise, and medicines.  · Have your cholesterol checked at least once a year.  · You may be prescribed medicine to lower cholesterol (statin). If you are not taking a statin, ask your health care provider if you should be.  Controlling your cholesterol may:  · Help prevent heart disease and stroke. These are the most common health problems for people with diabetes.  · Improve your blood flow.  Schedule and keep yearly physical exams and eye exams  Your health care provider will tell you how often you need medical visits depending on your diabetes management plan. Keep all follow-up visits as directed. This is important so possible problems can be identified early and complications can be avoided or treated.  · Every visit with your health care provider should include measuring your:  ? Weight.  ? Blood pressure.  ? Blood glucose control.  · Your A1c (hemoglobin A1c) level should be checked:  ? At least 2 times a year, if you are meeting your treatment goals.  ? 4 times a year, if you are not meeting treatment goals or if your treatment goals have changed.  · Your blood lipids (lipid profile) should be checked yearly. You should also be checked yearly for protein in your urine (urine microalbumin).  · If you have type 1 diabetes, get an eye exam 3-5 years after you are diagnosed, and then once a year after your first exam.  · If you have type 2 diabetes, get an eye exam as soon as you are diagnosed, and then once a year after your first exam.  Keep your vaccines current  It is recommended that you receive:  · A flu (influenza) vaccine every year.  · A  pneumonia (pneumococcal) vaccine and a hepatitis B vaccine. If you are age 65 or older, you may get the pneumonia vaccine as a series of two separate shots.  Ask your health care provider which other vaccines may be recommended.  Take care of your feet  Diabetes may cause you to have poor blood circulation to your legs and feet. Because of this, taking care of your feet is very important. Diabetes can cause:  · The skin on the feet to get thinner, break more easily, and heal more slowly.  · Nerve damage in your legs and feet, which results in decreased feeling. You may not notice minor injuries that could lead to serious problems.  To avoid foot problems:  · Check your skin and feet every day for cuts, bruises, redness, blisters, or sores.  · Schedule a foot exam with your health care provider once every year. This exam includes:  ? Inspecting of the structure and skin of your feet.  ? Checking the pulses and sensation in your feet.  · Make sure that your health care provider performs a visual foot exam at every medical visit.    Take care of your teeth  People with poorly controlled diabetes are more likely to have gum (periodontal) disease. Diabetes can make periodontal diseases harder to control. If not treated, periodontal diseases can lead to tooth loss. To prevent this:  · Brush your teeth twice a day.  · Floss at least once a day.  · Visit your dentist 2 times a year.  Drink responsibly  Limit alcohol intake to no more than 1 drink a day for nonpregnant women and 2 drinks a day for men. One drink equals 12 oz of beer, 5 oz of wine, or 1½ oz of hard liquor.   It is important to eat food when you drink alcohol to avoid low blood glucose (hypoglycemia). Avoid alcohol if you:  · Have a history of alcohol abuse or dependence.  · Are pregnant.  · Have liver disease, pancreatitis, advanced neuropathy, or severe hypertriglyceridemia.  Lessen stress  Living with diabetes can be stressful. When you are experiencing  stress, your blood glucose may be affected in two ways:  · Stress hormones may cause your blood glucose to rise.  · You may be distracted from taking good care of yourself.  Be aware of your stress level and make changes to help you manage challenging situations. To lower your stress levels:  · Consider joining a support group.  · Do planned relaxation or meditation.  · Do a hobby that you enjoy.  · Maintain healthy relationships.  · Exercise regularly.  · Work with your health care provider or a mental health professional.  Summary  · You can take action to prevent or slow down problems that are caused by diabetes (diabetes mellitus). Following your diabetes plan and taking care of yourself can reduce your risk of serious or life-threatening complications.  · Follow instructions from your health care providers about managing your diabetes. Your diabetes may be managed by a team of health care providers who can teach you how to care for yourself and can answer questions that you have.  · Your health care provider will tell you how often you need medical visits depending on your diabetes management plan. Keep all follow-up visits as directed. This is important so possible problems can be identified early and complications can be avoided or treated.  This information is not intended to replace advice given to you by your health care provider. Make sure you discuss any questions you have with your health care provider.  Document Released: 09/04/2012 Document Revised: 03/18/2019 Document Reviewed: 09/16/2017  [employer] Patient Education © 2020 [employer]    Coronary Artery Disease, Male  Coronary artery disease (CAD) is a condition in which the arteries that lead to the heart (coronary arteries) become narrow or blocked. The narrowing or blockage can lead to decreased blood flow to the heart. Prolonged reduced blood flow can cause a heart attack (myocardial infarction or MI). This condition may also be called coronary  heart disease.  Because CAD is the leading cause of death in men, it is important to understand what causes this condition and how it is treated.  What are the causes?  CAD is most often caused by atherosclerosis. This is the buildup of fat and cholesterol (plaque) on the inside of the arteries. Over time, the plaque may narrow or block the artery, reducing blood flow to the heart. Plaque can also become weak and break off within a coronary artery and cause a sudden blockage. Other less common causes of CAD include:  · A blood clot or a piece of a blood clot or other substance that blocks the flow of blood in a coronary artery (embolism).  · A tearing of the artery (spontaneous coronary artery dissection).  · An enlargement of an artery (aneurysm).  · Inflammation (vasculitis) in the artery wall.  What increases the risk?  The following factors may make you more likely to develop this condition:  · Age. Men over age 45 are at a greater risk of CAD.  · Family history of CAD.  · Gender. Men often develop CAD earlier in life than women.  · High blood pressure (hypertension).  · Diabetes.  · High cholesterol levels.  · Tobacco use.  · Excessive alcohol use.  · Lack of exercise.  · A diet high in saturated and trans fats, such as fried food and processed meat.  Other possible risk factors include:  · High stress levels.  · Depression.  · Obesity.  · Sleep apnea.  What are the signs or symptoms?  Many people do not have any symptoms during the early stages of CAD. As the condition progresses, symptoms may include:  · Chest pain (angina). The pain can:  ? Feel like crushing or squeezing, or like a tightness, pressure, fullness, or heaviness in the chest.  ? Last more than a few minutes or can stop and recur. The pain tends to get worse with exercise or stress and to fade with rest.  · Pain in the arms, neck, jaw, ear, or back.  · Unexplained heartburn or indigestion.  · Shortness of breath.  · Nausea or vomiting.  · Sudden  light-headedness.  · Sudden cold sweats.  · Fluttering or fast heartbeat (palpitations).  How is this diagnosed?  This condition is diagnosed based on:  · Your family and medical history.  · A physical exam.  · Tests, including:  ? A test to check the electrical signals in your heart (electrocardiogram).  ? Exercise stress test. This looks for signs of blockage when the heart is stressed with exercise, such as running on a treadmill.  ? Pharmacologic stress test. This test looks for signs of blockage when the heart is being stressed with a medicine.  ? Blood tests.  ? Coronary angiogram. This is a procedure to look at the coronary arteries to see if there is any blockage. During this test, a dye is injected into your arteries so they appear on an X-ray.  ? Coronary artery CT scan. This CT scan helps detect calcium deposits in your coronary arteries. Calcium deposits are an indicator of CAD.  ? A test that uses sound waves to take a picture of your heart (echocardiogram).  ? Chest X-ray.  How is this treated?  This condition may be treated by:  · Healthy lifestyle changes to reduce risk factors.  · Medicines such as:  ? Antiplatelet medicines and blood-thinning medicines, such as aspirin. These help to prevent blood clots.  ? Nitroglycerin.  ? Blood pressure medicines.  ? Cholesterol-lowering medicine.  · Coronary angioplasty and stenting. During this procedure, a thin, flexible tube is inserted through a blood vessel and into a blocked artery. A balloon or similar device on the end of the tube is inflated to open up the artery. In some cases, a small, mesh tube (stent) is inserted into the artery to keep it open.  · Coronary artery bypass surgery. During this surgery, veins or arteries from other parts of the body are used to create a bypass around the blockage and allow blood to reach your heart.  Follow these instructions at home:  Medicines  · Take over-the-counter and prescription medicines only as told by your  health care provider.  · Do not take the following medicines unless your health care provider approves:  ? NSAIDs, such as ibuprofen, naproxen, or celecoxib.  ? Vitamin supplements that contain vitamin A, vitamin E, or both.  Lifestyle  · Follow an exercise program approved by your health care provider. Aim for 150 minutes of moderate exercise or 75 minutes of vigorous exercise each week.  · Maintain a healthy weight or lose weight as approved by your health care provider.  · Learn to manage stress or try to limit your stress. Ask your health care provider for suggestions if you need help.  · Get screened for depression and seek treatment, if needed.  · Do not use any products that contain nicotine or tobacco, such as cigarettes, e-cigarettes, and chewing tobacco. If you need help quitting, ask your health care provider.  · Do not use illegal drugs.  Eating and drinking    · Follow a heart-healthy diet. A dietitian can help educate you about healthy food options and changes. In general, eat plenty of fruits and vegetables, lean meats, and whole grains.  · Avoid foods high in:  ? Sugar.  ? Salt (sodium).  ? Saturated fat, such as processed or fatty meat.  ? Trans fat, such as fried foods.  · Use healthy cooking methods such as roasting, grilling, broiling, baking, poaching, steaming, or stir-frying.  · Do not drink alcohol if your health care provider tells you not to drink.  · If you drink alcohol:  ? Limit how much you have to 0-2 drinks per day.  ? Be aware of how much alcohol is in your drink. In the [place] one drink equals one 12 oz bottle of beer (355 mL), one 5 oz glass of wine (148 mL), or one 1½ oz glass of hard liquor (44 mL).  General instructions  · Manage any other health conditions, such as hypertension and diabetes. These conditions affect your heart.  · Your health care provider may ask you to monitor your blood pressure. Ideally, your blood pressure should be below 130/80.  · Keep all follow-up visits  as told by your health care provider. This is important.  Get help right away if:  · You have pain in your chest, neck, ear, arm, jaw, stomach, or back that:  ? Lasts more than a few minutes.  ? Is recurring.  ? Is not relieved by taking medicine under your tongue (sublingual nitroglycerin).  · You have profuse sweating without cause.  · You have unexplained:  ? Heartburn or indigestion.  ? Shortness of breath or difficulty breathing.  ? Fluttering or fast heartbeat (palpitations).  ? Nausea or vomiting.  ? Fatigue.  ? Feelings of nervousness or anxiety.  ? Weakness.  ? Diarrhea.  · You have sudden light-headedness or dizziness.  · You faint.  · You feel like hurting yourself or think about taking your own life.  These symptoms may represent a serious problem that is an emergency. Do not wait to see if the symptoms will go away. Get medical help right away. Call your local emergency services (911 in the [place] Do not drive yourself to the hospital.  Summary  · Coronary artery disease (CAD) is a condition in which the arteries that lead to the heart (coronary arteries) become narrow or blocked. The narrowing or blockage can lead to a heart attack.  · Many people do not have any symptoms during the early stages of CAD.  · CAD can be treated with lifestyle changes, medicines, surgery, or a combination of these treatments.  This information is not intended to replace advice given to you by your health care provider. Make sure you discuss any questions you have with your health care provider.  Document Released: 07/15/2015 Document Revised: 09/06/2019 Document Reviewed: 08/27/2019  [employer] Patient Education © 2020 [employer]    Dyslipidemia  Dyslipidemia is an imbalance of waxy, fat-like substances (lipids) in the blood. The body needs lipids in small amounts. Dyslipidemia often involves a high level of cholesterol or triglycerides, which are types of lipids.  Common forms of dyslipidemia include:  · High levels of  LDL cholesterol. LDL is the type of cholesterol that causes fatty deposits (plaques) to build up in the blood vessels that carry blood away from your heart (arteries).  · Low levels of HDL cholesterol. HDL cholesterol is the type of cholesterol that protects against heart disease. High levels of HDL remove the LDL buildup from arteries.  · High levels of triglycerides. Triglycerides are a fatty substance in the blood that is linked to a buildup of plaques in the arteries.  What are the causes?  Primary dyslipidemia is caused by changes (mutations) in genes that are passed down through families (inherited). These mutations cause several types of dyslipidemia.  Secondary dyslipidemia is caused by lifestyle choices and diseases that lead to dyslipidemia, such as:  · Eating a diet that is high in animal fat.  · Not getting enough exercise.  · Having diabetes, kidney disease, liver disease, or thyroid disease.  · Drinking large amounts of alcohol.  · Using certain medicines.  What increases the risk?  You are more likely to develop this condition if you are an older man or if you are a woman who has gone through menopause. Other risk factors include:  · Having a family history of dyslipidemia.  · Taking certain medicines, including birth control pills, steroids, some diuretics, and beta-blockers.  · Smoking cigarettes.  · Eating a high-fat diet.  · Having certain medical conditions such as diabetes, polycystic ovary syndrome (PCOS), kidney disease, liver disease, or hypothyroidism.  · Not exercising regularly.  · Being overweight or obese with too much belly fat.  What are the signs or symptoms?  In most cases, dyslipidemia does not usually cause any symptoms.  In severe cases, very high lipid levels can cause:  · Fatty bumps under the skin (xanthomas).  · White or gray ring around the black center (pupil) of the eye.  Very high triglyceride levels can cause inflammation of the pancreas (pancreatitis).  How is this  diagnosed?  Your health care provider may diagnose dyslipidemia based on a routine blood test (fasting blood test). Because most people do not have symptoms of the condition, this blood testing (lipid profile) is done on adults age 20 and older and is repeated every 5 years. This test checks:  · Total cholesterol. This measures the total amount of cholesterol in your blood, including LDL cholesterol, HDL cholesterol, and triglycerides. A healthy number is below 200.  · LDL cholesterol. The target number for LDL cholesterol is different for each person, depending on individual risk factors. Ask your health care provider what your LDL cholesterol should be.  · HDL cholesterol. An HDL level of 60 or higher is best because it helps to protect against heart disease. A number below 40 for men or below 50 for women increases the risk for heart disease.  · Triglycerides. A healthy triglyceride number is below 150.  If your lipid profile is abnormal, your health care provider may do other blood tests.  How is this treated?  Treatment depends on the type of dyslipidemia that you have and your other risk factors for heart disease and stroke. Your health care provider will have a target range for your lipid levels based on this information.  For many people, this condition may be treated by lifestyle changes, such as diet and exercise. Your health care provider may recommend that you:  · Get regular exercise.  · Make changes to your diet.  · Quit smoking if you smoke.  If diet changes and exercise do not help you reach your goals, your health care provider may also prescribe medicine to lower lipids. The most commonly prescribed type of medicine lowers your LDL cholesterol (statin drug). If you have a high triglyceride level, your provider may prescribe another type of drug (fibrate) or an omega-3 fish oil supplement, or both.  Follow these instructions at home:    Eating and drinking  · Follow instructions from your health  care provider or dietitian about eating or drinking restrictions.  · Eat a healthy diet as told by your health care provider. This can help you reach and maintain a healthy weight, lower your LDL cholesterol, and raise your HDL cholesterol. This may include:  ? Limiting your calories, if you are overweight.  ? Eating more fruits, vegetables, whole grains, fish, and lean meats.  ? Limiting saturated fat, trans fat, and cholesterol.  · If you drink alcohol:  ? Limit how much you use.  ? Be aware of how much alcohol is in your drink. In the [place] one drink equals one 12 oz bottle of beer (355 mL), one 5 oz glass of wine (148 mL), or one 1½ oz glass of hard liquor (44 mL).  · Do not drink alcohol if:  ? Your health care provider tells you not to drink.  ? You are pregnant, may be pregnant, or are planning to become pregnant.  Activity  · Get regular exercise. Start an exercise and strength training program as told by your health care provider. Ask your health care provider what activities are safe for you. Your health care provider may recommend:  ? 30 minutes of aerobic activity 4-6 days a week. Brisk walking is an example of aerobic activity.  ? Strength training 2 days a week.  General instructions  · Do not use any products that contain nicotine or tobacco, such as cigarettes, e-cigarettes, and chewing tobacco. If you need help quitting, ask your health care provider.  · Take over-the-counter and prescription medicines only as told by your health care provider. This includes supplements.  · Keep all follow-up visits as told by your health care provider.  Contact a health care provider if:  · You are:  ? Having trouble sticking to your exercise or diet plan.  ? Struggling to quit smoking or control your use of alcohol.  Summary  · Dyslipidemia often involves a high level of cholesterol or triglycerides, which are types of lipids.  · Treatment depends on the type of dyslipidemia that you have and your other risk  factors for heart disease and stroke.  · For many people, treatment starts with lifestyle changes, such as diet and exercise.  · Your health care provider may prescribe medicine to lower lipids.  This information is not intended to replace advice given to you by your health care provider. Make sure you discuss any questions you have with your health care provider.  Document Released: 12/23/2014 Document Revised: 08/12/2019 Document Reviewed: 07/19/2019  [employer] Patient Education © 2020 [employer]

## 2020-07-23 DIAGNOSIS — Z76.0 MEDICATION REFILL: ICD-10-CM

## 2020-07-23 DIAGNOSIS — I10 ESSENTIAL HYPERTENSION: ICD-10-CM

## 2020-07-23 RX ORDER — TRIAMTERENE AND HYDROCHLOROTHIAZIDE 75; 50 MG/1; MG/1
TABLET ORAL
Qty: 30 TABLET | Refills: 0 | Status: SHIPPED | OUTPATIENT
Start: 2020-07-23 | End: 2020-09-04

## 2020-08-01 LAB
ALBUMIN SERPL-MCNC: 4.9 G/DL (ref 3.5–5.2)
ALBUMIN/GLOB SERPL: 2 G/DL
ALP SERPL-CCNC: 70 U/L (ref 39–117)
ALT SERPL-CCNC: 66 U/L (ref 1–41)
AST SERPL-CCNC: 57 U/L (ref 1–40)
BASOPHILS # BLD AUTO: 0.03 10E3/MM3 (ref 0–0.2)
BASOPHILS NFR BLD AUTO: 0.8 % (ref 0–1.5)
BILIRUB SERPL-MCNC: 0.6 MG/DL (ref 0.2–1.2)
BUN SERPL-MCNC: 19 MG/DL (ref 8–23)
BUN/CREAT SERPL: 13.2 (ref 7–25)
CALCIUM SERPL-MCNC: 10.7 MG/DL (ref 8.6–10.5)
CHLORIDE SERPL-SCNC: 96 MMOL/L (ref 98–107)
CHOLEST SERPL-MCNC: 172 MG/DL (ref 0–200)
CO2 SERPL-SCNC: 28 MMOL/L (ref 22–29)
CREAT SERPL-MCNC: 1.44 MG/DL (ref 0.76–1.27)
EOSINOPHIL # BLD AUTO: 0.15 10E3/MM3 (ref 0–0.4)
EOSINOPHIL NFR BLD AUTO: 3.9 % (ref 0.3–6.2)
ERYTHROCYTE [DISTWIDTH] IN BLOOD BY AUTOMATED COUNT: 12.6 % (ref 12.3–15.4)
GLOBULIN SER CALC-MCNC: 2.4 GM/DL
GLUCOSE SERPL-MCNC: 154 MG/DL (ref 65–99)
HCT VFR BLD AUTO: 45.2 % (ref 37.5–51)
HDLC SERPL-MCNC: 33 MG/DL (ref 40–60)
HGB BLD-MCNC: 14.7 G/DL (ref 13–17.7)
IMM GRANULOCYTES # BLD AUTO: 0.01 10E3/MM3 (ref 0–0.05)
IMM GRANULOCYTES NFR BLD AUTO: 0.3 % (ref 0–0.5)
LDLC SERPL CALC-MCNC: 85 MG/DL (ref 0–99)
LYMPHOCYTES # BLD AUTO: 0.68 10E3/MM3 (ref 0.7–3.1)
LYMPHOCYTES NFR BLD AUTO: 17.5 % (ref 19.6–45.3)
MCH RBC QN AUTO: 29.9 PG (ref 26.6–33)
MCHC RBC AUTO-ENTMCNC: 32.5 G/DL (ref 31.5–35.7)
MCV RBC AUTO: 92.1 FL (ref 79–97)
MONOCYTES # BLD AUTO: 0.37 10E3/MM3 (ref 0.1–0.9)
MONOCYTES NFR BLD AUTO: 9.5 % (ref 5–12)
NEUTROPHILS # BLD AUTO: 2.65 10E3/MM3 (ref 1.7–7)
NEUTROPHILS NFR BLD AUTO: 68 % (ref 42.7–76)
PLATELET # BLD AUTO: 212 10E3/MM3 (ref 140–450)
POTASSIUM SERPL-SCNC: 4.2 MMOL/L (ref 3.5–5.2)
PROT SERPL-MCNC: 7.3 G/DL (ref 6–8.5)
RBC # BLD AUTO: 4.91 10E6/MM3 (ref 4.14–5.8)
SODIUM SERPL-SCNC: 139 MMOL/L (ref 136–145)
TRIGL SERPL-MCNC: 272 MG/DL (ref 0–150)
TSH SERPL DL<=0.005 MIU/L-ACNC: 1.32 MIU/ML (ref 0.27–4.2)
VLDLC SERPL CALC-MCNC: 54 MG/DL
WBC # BLD AUTO: 3.89 10E3/MM3 (ref 3.4–10.8)

## 2020-08-18 ENCOUNTER — OFFICE VISIT (OUTPATIENT)
Dept: FAMILY MEDICINE CLINIC | Facility: CLINIC | Age: 60
End: 2020-08-18

## 2020-08-18 VITALS
HEIGHT: 73 IN | HEART RATE: 76 BPM | DIASTOLIC BLOOD PRESSURE: 80 MMHG | BODY MASS INDEX: 38.97 KG/M2 | OXYGEN SATURATION: 95 % | SYSTOLIC BLOOD PRESSURE: 130 MMHG | RESPIRATION RATE: 26 BRPM | WEIGHT: 294 LBS | TEMPERATURE: 98.7 F

## 2020-08-18 DIAGNOSIS — E11.9 TYPE 2 DIABETES MELLITUS TREATED WITHOUT INSULIN (HCC): Primary | ICD-10-CM

## 2020-08-18 DIAGNOSIS — N18.30 STAGE 3 CHRONIC KIDNEY DISEASE (HCC): ICD-10-CM

## 2020-08-18 PROCEDURE — 99213 OFFICE O/P EST LOW 20 MIN: CPT | Performed by: NURSE PRACTITIONER

## 2020-08-18 NOTE — PROGRESS NOTES
Subjective   Omar Can is a 60 y.o. male.     Patient presents today s/p nephrology consult to discuss changing his diabetic medication. Patient states that he was advised by his nephrologist to discontinue Actos and to cut his Maxzide dose in half due to his impaired renal function. He reports that the nephrologist advised him to follow-up with his PCP to discuss a different diabetic medication to take instead of Actos.     Diabetes   He presents for his follow-up diabetic visit. He has type 2 diabetes mellitus. Disease course: worsening. There are no hypoglycemic associated symptoms. There are no diabetic associated symptoms. There are no hypoglycemic complications. Diabetic complications include heart disease and nephropathy. Risk factors for coronary artery disease include male sex, obesity, diabetes mellitus and dyslipidemia. Current diabetic treatment includes oral agent (triple therapy). He is compliant with treatment all of the time. Weight trend: increasing. He is following a generally healthy diet. Meal planning includes avoidance of concentrated sweets. He has not had a previous visit with a dietitian. An ACE inhibitor/angiotensin II receptor blocker is not being taken. Eye exam is current.       The following portions of the patient's history were reviewed and updated as appropriate: allergies, current medications, past family history, past medical history, past social history, past surgical history and problem list.    Allergies as of 08/18/2020 - Reviewed 08/18/2020   Allergen Reaction Noted   • Cyclobenzaprine Hives 05/26/2010   • Flucloxacillin Hives 01/03/2020       Current Outpatient Medications on File Prior to Visit   Medication Sig   • aspirin 81 MG tablet aspirin 81 mg tablet   Take 1 tablet every day by oral route.   • atorvastatin (LIPITOR) 40 MG tablet Take 1 tablet by mouth Every Night.   • carvedilol (COREG) 6.25 MG tablet Take 1 tablet by mouth 2 (Two) Times a Day.   • Coenzyme Q10  (CO Q-10) 100 MG capsule Co Q-10 100 mg capsule   Take 2 capsules every day by oral route.   • glimepiride (AMARYL) 4 MG tablet Take 1 tablet by mouth 2 (Two) Times a Day.   • glucose blood test strip Use as instructed   • glucose monitor monitoring kit 1 each As Needed (for diabetes).   • Lancets (ONETOUCH ULTRASOFT) lancets Check daily and prn   • metFORMIN (GLUCOPHAGE) 850 MG tablet Take 1/2 tablet twice daily   • Multiple Vitamin (MULTI-VITAMIN DAILY PO) Multi Vitamin   qd   • nitroglycerin (Nitrostat) 0.3 MG SL tablet Place 0.3 mg under the tongue Every 5 (Five) Minutes As Needed for Chest Pain. Take no more than 3 doses in 15 minutes.   • pioglitazone (ACTOS) 45 MG tablet Take 1 tablet by mouth Daily.   • tamsulosin (FLOMAX) 0.4 MG capsule 24 hr capsule Take 1 capsule by mouth Daily.   • triamterene-hydrochlorothiazide (MAXZIDE) 75-50 MG per tablet TAKE 1 TABLET BY MOUTH ONE TIME A DAY      No current facility-administered medications on file prior to visit.        Review of Systems   Constitutional: Negative.    Respiratory: Negative.    Cardiovascular: Negative.    Gastrointestinal: Negative.    Genitourinary: Negative.        Objective   Physical Exam   Constitutional: He is oriented to person, place, and time. Vital signs are normal. He appears well-developed and well-nourished. He is cooperative. He does not appear ill. No distress.   Cardiovascular: Normal rate, regular rhythm and normal heart sounds.   Pulmonary/Chest: Effort normal and breath sounds normal.   Neurological: He is alert and oriented to person, place, and time.   Skin: Skin is warm, dry and intact. No rash noted. He is not diaphoretic.   Psychiatric: He has a normal mood and affect. His behavior is normal. Judgment and thought content normal.   Vitals reviewed.    Vitals:    08/18/20 1002   BP: 130/80   Pulse: 76   Resp: 26   Temp: 98.7 °F (37.1 °C)   SpO2: 95%     Body mass index is 38.79 kg/m².    Assessment/Plan   Omar was seen today  for medication questions.    Diagnoses and all orders for this visit:    Type 2 diabetes mellitus treated without insulin (CMS/[facility])  -     Semaglutide,0.25 or 0.5MG/DOS, (Ozempic, 0.25 or 0.5 MG/DOSE,) 2 MG/1.5ML solution pen-injector; Inject 0.25 mg under the skin into the appropriate area as directed 1 (One) Time Per Week (samples provided to patient and instructed on use. First dose administered in office).    Stage 3 chronic kidney disease (CMS/[facility])  -     Semaglutide,0.25 or 0.5MG/DOS, (Ozempic, 0.25 or 0.5 MG/DOSE,) 2 MG/1.5ML solution pen-injector; Inject 0.25 mg under the skin into the appropriate area as directed 1 (One) Time Per Week.     Discussed the nature of the medical condition(s) risks, complications, implications, management, safe and proper use of medications. Encouraged medication compliance, and keeping scheduled follow up appointments with me and any other providers.     F/U 4 weeks.

## 2020-08-19 PROBLEM — N18.30 STAGE 3 CHRONIC KIDNEY DISEASE (HCC): Status: ACTIVE | Noted: 2020-08-19

## 2020-08-19 NOTE — PATIENT INSTRUCTIONS
Chronic Kidney Disease, Adult  Chronic kidney disease (CKD) occurs when the kidneys become damaged slowly over a long period of time. The kidneys are a pair of organs that do many important jobs in the body, including:  · Removing waste and extra fluid from the blood to make urine.  · Making hormones that maintain the amount of fluid in tissues and blood vessels.  · Maintaining the right amount of fluids and chemicals in the body.  A small amount of kidney damage may not cause problems, but a large amount of damage may make it hard or impossible for the kidneys to work the way they should. If steps are not taken to slow down kidney damage or to stop it from getting worse, the kidneys may stop working permanently (end-stage renal disease or ESRD). Most of the time, CKD does not go away, but it can often be controlled. People who have CKD are usually able to live normal lives.  What are the causes?  The most common causes of this condition are diabetes and high blood pressure (hypertension). Other causes include:  · Heart and blood vessel (cardiovascular) disease.  · Kidney diseases, such as:  ? Glomerulonephritis.  ? Interstitial nephritis.  ? Polycystic kidney disease.  ? Renal vascular disease.  · Diseases that affect the immune system.  · Genetic diseases.  · Medicines that damage the kidneys, such as anti-inflammatory medicines.  · Being around or being in contact with poisonous (toxic) substances.  · A kidney or urinary infection that occurs again and again (recurs).  · Vasculitis. This is swelling or inflammation of the blood vessels.  · A problem with urine flow that may be caused by:  ? Cancer.  ? Having kidney stones more than one time.  ? An enlarged prostate, in males.  What increases the risk?  You are more likely to develop this condition if you:  · Are older than age 60.  · Are female.  · Are -American, , , , or .  · Are a current or former  smoker.  · Are obese.  · Have a family history of kidney disease or failure.  · Often take medicines that are damaging to the kidneys.  What are the signs or symptoms?  Symptoms of this condition include:  · Swelling (edema) of the face, legs, ankles, or feet.  · Tiredness (lethargy) and having less energy.  · Nausea or vomiting.  · Confusion or trouble concentrating.  · Problems with urination, such as:  ? Painful or burning feeling during urination.  ? Decreased urine production.  ? Frequent urination, especially at night.  ? Bloody urine.  · Muscle twitches and cramps, especially in the legs.  · Shortness of breath.  · Weakness.  · Loss of appetite.  · Metallic taste in the mouth.  · Trouble sleeping.  · Dry, itchy skin.  · A low blood count (anemia).  · Pale lining of the eyelids and surface of the eye (conjunctiva).  Symptoms develop slowly and may not be obvious until the kidney damage becomes severe. It is possible to have kidney disease for years without having any symptoms.  How is this diagnosed?  This condition may be diagnosed based on:  · Blood tests.  · Urine tests.  · Imaging tests, such as an ultrasound or CT scan.  · A test in which a sample of tissue is removed from the kidneys to be examined under a microscope (kidney biopsy).  These test results will help your health care provider determine how serious the CKD is.  How is this treated?  There is no cure for most cases of this condition, but treatment usually relieves symptoms and prevents or slows the progression of the disease. Treatment may include:  · Making diet changes, which may require you to avoid alcohol, salty foods (sodium), and foods that are high in potassium, calcium, and protein.  · Medicines:  ? To lower blood pressure.  ? To control blood glucose.  ? To relieve anemia.  ? To relieve swelling.  ? To protect your bones.  ? To improve the balance of electrolytes in your blood.  · Removing toxic waste from the body through types of  dialysis, if the kidneys can no longer do their job (kidney failure).  · Managing any other conditions that are causing your CKD or making it worse.  Follow these instructions at home:  Medicines  · Take over-the-counter and prescription medicines only as told by your health care provider. The dose of some medicines that you take may need to be adjusted.  · Do not take any new medicines unless approved by your health care provider. Many medicines can worsen your kidney damage.  · Do not take any vitamin and mineral supplements unless approved by your health care provider. Many nutritional supplements can worsen your kidney damage.  General instructions  · Follow your prescribed diet as told by your health care provider.  · Do not use any products that contain nicotine or tobacco, such as cigarettes and e-cigarettes. If you need help quitting, ask your health care provider.  · Monitor and track your blood pressure at home. Report changes in your blood pressure as told by your health care provider.  · If you are being treated for diabetes, monitor and track your blood sugar (blood glucose) levels as told by your health care provider.  · Maintain a healthy weight. If you need help with this, ask your health care provider.  · Start or continue an exercise plan. Exercise at least 30 minutes a day, 5 days a week.  · Keep your immunizations up to date as told by your health care provider.  · Keep all follow-up visits as told by your health care provider. This is important.  Where to find more information  · American Association of Kidney Patients: www.aakp.org  · National Kidney Foundation: www.kidney.org  · American Kidney Fund: www.akfinc.org  · [facility]: www.lifeoptions.org and www.kidneyschool.org  Contact a health care provider if:  · Your symptoms get worse.  · You develop new symptoms.  Get help right away if:  · You develop symptoms of ESRD, which include:  ? Headaches.  ? Numbness in the  hands or feet.  ? Easy bruising.  ? Frequent hiccups.  ? Chest pain.  ? Shortness of breath.  ? Lack of menstruation, in women.  · You have a fever.  · You have decreased urine production.  · You have pain or bleeding when you urinate.  Summary  · Chronic kidney disease (CKD) occurs when the kidneys become damaged slowly over a long period of time.  · The most common causes of this condition are diabetes and high blood pressure (hypertension).  · There is no cure for most cases of this condition, but treatment usually relieves symptoms and prevents or slows the progression of the disease. Treatment may include a combination of medicines and lifestyle changes.  This information is not intended to replace advice given to you by your health care provider. Make sure you discuss any questions you have with your health care provider.  Document Released: 09/26/2009 Document Revised: 11/30/2018 Document Reviewed: 01/25/2018  [employer] Patient Education © 2020 [employer]    Living With Diabetes  Diabetes (type 1 diabetes mellitus or type 2 diabetes mellitus) is a condition in which the body does not have enough of a hormone called insulin, or the body does not respond properly to insulin. Normally, insulin allows sugars (glucose) to enter cells in the body. The cells use glucose for energy. With diabetes, extra glucose builds up in the blood instead of going into cells, which results in high blood glucose (hyperglycemia).  How to manage lifestyle changes  Managing diabetes includes medical treatments as well as lifestyle changes. If diabetes is not managed well, serious physical and emotional complications can occur. Taking good care of yourself means that you are responsible for:  · Monitoring glucose regularly.  · Eating a healthy diet.  · Exercising regularly.  · Meeting with health care providers.  · Taking medicines as directed.  Some people may feel a lot of stress about managing their diabetes. This is known as  emotional distress, and it is very common. Living with diabetes can place you at risk for emotional distress, depression, or anxiety. These disorders can be confusing and can make diabetes management more difficult.  How to recognize stress  Emotional distress  Symptoms of emotional distress include:  · Anger about having a diagnosis of diabetes.  · Fear or frustration about your diagnosis and the changes you need to make to manage the condition.  · Being overly worried about the care that you need or the cost of the care that you need.  · Feeling like you caused your condition by doing something wrong.  · Fear of unpredictable situations, like low or high blood glucose.  · Feeling judged by your health care providers.  · Feeling very alone with the disease.  · Getting too tired or worn out with the demands of daily care.  Depression  Having diabetes means that you are at a higher risk for depression. Having depression also means that you are at a higher risk for diabetes. Your health care provider may test (screen) you for symptoms of depression. It is important to recognize depression symptoms and to start treatment for depression soon after it is diagnosed. The following are some symptoms of depression:  · Loss of interest in things that you used to enjoy.  · Trouble sleeping, or often waking up early and not being able to get back to sleep.  · A change in appetite.  · Feeling tired most of the day.  · Feeling nervous and anxious.  · Feeling guilty and worrying that you are a burden to others.  · Feeling depressed more often than you do not feel that way.  · Thoughts of hurting yourself or feeling that you want to die.  If you have any of these symptoms for 2 weeks or longer, reach out to a health care provider.  Follow these instructions at home:  Managing emotional distress  The following are some ways to manage emotional distress:  · Talk with your health care provider or certified diabetes educator. Consider  working with a counselor or therapist.  · Learn as much as you can about diabetes and its treatment. Meet with a certified diabetes educator or take a class to learn how to manage your condition.  · Keep a journal of your thoughts and concerns.  · Accept that some things are out of your control.  · Talk with other people who have diabetes. It can help to talk with others about the emotional distress that you feel.  · Find ways to manage stress that work for you. These may include art or music therapy, exercise, meditation, and hobbies.  · Seek support from spiritual leaders, family, and friends.  General instructions  · Follow your diabetes management plan.  · Keep all follow-up visits as told by your health care provider. This is important.  Where to find support    · Ask your health care provider to recommend a therapist who understands both depression and diabetes.  · Search for information and support from the American Diabetes Association: www.diabetes.org  · Find a certified diabetes educator and make an appointment through American Association of Diabetes Educators: www.diabeteseducator.org  Get help right away if:  · You have thoughts about hurting yourself or others.  If you ever feel like you may hurt yourself or others, or have thoughts about taking your own life, get help right away. You can go to your nearest emergency department or call:  · Your local emergency services (911 in the [place]  · A suicide crisis helpline, such as the National Suicide Prevention Lifeline at 1-444.111.3791. This is open 24 hours a day.  Summary  · Diabetes (type 1 diabetes mellitus or type 2 diabetes mellitus) is a condition in which the body does not have enough of a hormone called insulin, or the body does not respond properly to insulin.  · Living with diabetes puts you at risk for medical issues, and it also puts you at risk for emotional issues such as emotional distress, depression, and anxiety.  · Recognizing the  symptoms of emotional distress and depression may help you avoid problems with your diabetes control. It is important to start treatment for emotional distress and depression soon after they are diagnosed.  · Having diabetes means that you are at a higher risk for depression. Ask your health care provider to recommend a therapist who understands both depression and diabetes.  · If you experience symptoms of emotional distress or depression, it is important to discuss this with your health care provider, certified diabetes educator, or therapist.  This information is not intended to replace advice given to you by your health care provider. Make sure you discuss any questions you have with your health care provider.  Document Released: 05/03/2018 Document Revised: 12/30/2019 Document Reviewed: 05/03/2018  [employer] Patient Education © 2020 [employer]  Semaglutide injection solution  What is this medicine?  SEMAGLUTIDE (Philip a GLOO tide) is used to improve blood sugar control in adults with type 2 diabetes. This medicine may be used with other diabetes medicines. This drug may also reduce the risk of heart attack or stroke if you have type 2 diabetes and risk factors for heart disease.  This medicine may be used for other purposes; ask your health care provider or pharmacist if you have questions.  COMMON BRAND NAME(S): OZEMPIC  What should I tell my health care provider before I take this medicine?  They need to know if you have any of these conditions:  · endocrine tumors (MEN 2) or if someone in your family had these tumors  · eye disease, vision problems  · history of pancreatitis  · kidney disease  · stomach problems  · thyroid cancer or if someone in your family had thyroid cancer  · an unusual or allergic reaction to semaglutide, other medicines, foods, dyes, or preservatives  · pregnant or trying to get pregnant  · breast-feeding  How should I use this medicine?  This medicine is for injection under the skin  of your upper leg (thigh), stomach area, or upper arm. It is given once every week (every 7 days). You will be taught how to prepare and give this medicine. Use exactly as directed. Take your medicine at regular intervals. Do not take it more often than directed.  If you use this medicine with insulin, you should inject this medicine and the insulin separately. Do not mix them together. Do not give the injections right next to each other. Change (rotate) injection sites with each injection.  It is important that you put your used needles and syringes in a special sharps container. Do not put them in a trash can. If you do not have a sharps container, call your pharmacist or healthcare provider to get one.  A special MedGuide will be given to you by the pharmacist with each prescription and refill. Be sure to read this information carefully each time.  Talk to your pediatrician regarding the use of this medicine in children. Special care may be needed.  Overdosage: If you think you have taken too much of this medicine contact a poison control center or emergency room at once.  NOTE: This medicine is only for you. Do not share this medicine with others.  What if I miss a dose?  If you miss a dose, take it as soon as you can within 5 days after the missed dose. Then take your next dose at your regular weekly time. If it has been longer than 5 days after the missed dose, do not take the missed dose. Take the next dose at your regular time. Do not take double or extra doses. If you have questions about a missed dose, contact your health care provider for advice.  What may interact with this medicine?  · other medicines for diabetes  Many medications may cause changes in blood sugar, these include:  · alcohol containing beverages  · antiviral medicines for HIV or AIDS  · aspirin and aspirin-like drugs  · certain medicines for blood pressure, heart disease, irregular heart beat  · chromium  · diuretics  · female hormones,  such as estrogens or progestins, birth control pills  · fenofibrate  · gemfibrozil  · isoniazid  · lanreotide  · male hormones or anabolic steroids  · MAOIs like Carbex, Eldepryl, Marplan, Nardil, and Parnate  · medicines for weight loss  · medicines for allergies, asthma, cold, or cough  · medicines for depression, anxiety, or psychotic disturbances  · niacin  · nicotine  · NSAIDs, medicines for pain and inflammation, like ibuprofen or naproxen  · octreotide  · pasireotide  · pentamidine  · phenytoin  · probenecid  · quinolone antibiotics such as ciprofloxacin, levofloxacin, ofloxacin  · some herbal dietary supplements  · steroid medicines such as prednisone or cortisone  · sulfamethoxazole; trimethoprim  · thyroid hormones  Some medications can hide the warning symptoms of low blood sugar (hypoglycemia). You may need to monitor your blood sugar more closely if you are taking one of these medications. These include:  · beta-blockers, often used for high blood pressure or heart problems (examples include atenolol, metoprolol, propranolol)  · clonidine  · guanethidine  · reserpine  This list may not describe all possible interactions. Give your health care provider a list of all the medicines, herbs, non-prescription drugs, or dietary supplements you use. Also tell them if you smoke, drink alcohol, or use illegal drugs. Some items may interact with your medicine.  What should I watch for while using this medicine?  Visit your doctor or health care professional for regular checks on your progress.  Drink plenty of fluids while taking this medicine. Check with your doctor or health care professional if you get an attack of severe diarrhea, nausea, and vomiting. The loss of too much body fluid can make it dangerous for you to take this medicine.  A test called the HbA1C (A1C) will be monitored. This is a simple blood test. It measures your blood sugar control over the last 2 to 3 months. You will receive this test every  3 to 6 months.  Learn how to check your blood sugar. Learn the symptoms of low and high blood sugar and how to manage them.  Always carry a quick-source of sugar with you in case you have symptoms of low blood sugar. Examples include hard sugar candy or glucose tablets. Make sure others know that you can choke if you eat or drink when you develop serious symptoms of low blood sugar, such as seizures or unconsciousness. They must get medical help at once.  Tell your doctor or health care professional if you have high blood sugar. You might need to change the dose of your medicine. If you are sick or exercising more than usual, you might need to change the dose of your medicine.  Do not skip meals. Ask your doctor or health care professional if you should avoid alcohol. Many nonprescription cough and cold products contain sugar or alcohol. These can affect blood sugar.  Pens should never be shared. Even if the needle is changed, sharing may result in passing of viruses like hepatitis or HIV.  Wear a medical ID bracelet or chain, and carry a card that describes your disease and details of your medicine and dosage times.  Do not become pregnant while taking this medicine. Women should inform their doctor if they wish to become pregnant or think they might be pregnant. There is a potential for serious side effects to an unborn child. Talk to your health care professional or pharmacist for more information.  What side effects may I notice from receiving this medicine?  Side effects that you should report to your doctor or health care professional as soon as possible:  · allergic reactions like skin rash, itching or hives, swelling of the face, lips, or tongue  · breathing problems  · changes in vision  · diarrhea that continues or is severe  · lump or swelling on the neck  · severe nausea  · signs and symptoms of infection like fever or chills; cough; sore throat; pain or trouble passing urine  · signs and symptoms of low  blood sugar such as feeling anxious, confusion, dizziness, increased hunger, unusually weak or tired, sweating, shakiness, cold, irritable, headache, blurred vision, fast heartbeat, loss of consciousness  · signs and symptoms of kidney injury like trouble passing urine or change in the amount of urine  · trouble swallowing  · unusual stomach upset or pain  · vomiting  Side effects that usually do not require medical attention (report to your doctor or health care professional if they continue or are bothersome):  · constipation  · diarrhea  · nausea  · pain, redness, or irritation at site where injected  · stomach upset  This list may not describe all possible side effects. Call your doctor for medical advice about side effects. You may report side effects to FDA at 8-167-ZOM-4598.  Where should I keep my medicine?  Keep out of the reach of children.  Store unopened pens in a refrigerator between 2 and 8 degrees C (36 and 46 degrees F). Do not freeze. Protect from light and heat. After you first use the pen, it can be stored for 56 days at room temperature between 15 and 30 degrees C (59 and 86 degrees F) or in a refrigerator. Throw away your used pen after 56 days or after the expiration date, whichever comes first.  Do not store your pen with the needle attached. If the needle is left on, medicine may leak from the pen.  NOTE: This sheet is a summary. It may not cover all possible information. If you have questions about this medicine, talk to your doctor, pharmacist, or health care provider.  © 2020 [employer] (2020-01-17 14:25:32)

## 2020-09-03 DIAGNOSIS — I10 ESSENTIAL HYPERTENSION: ICD-10-CM

## 2020-09-03 DIAGNOSIS — Z76.0 MEDICATION REFILL: ICD-10-CM

## 2020-09-04 RX ORDER — TRIAMTERENE AND HYDROCHLOROTHIAZIDE 75; 50 MG/1; MG/1
TABLET ORAL
Qty: 30 TABLET | Refills: 0 | Status: SHIPPED | OUTPATIENT
Start: 2020-09-04 | End: 2020-11-06

## 2020-09-09 ENCOUNTER — OFFICE VISIT (OUTPATIENT)
Dept: FAMILY MEDICINE CLINIC | Facility: CLINIC | Age: 60
End: 2020-09-09

## 2020-09-09 VITALS
HEART RATE: 80 BPM | RESPIRATION RATE: 18 BRPM | WEIGHT: 292.6 LBS | TEMPERATURE: 98.2 F | HEIGHT: 73 IN | DIASTOLIC BLOOD PRESSURE: 84 MMHG | BODY MASS INDEX: 38.78 KG/M2 | OXYGEN SATURATION: 98 % | SYSTOLIC BLOOD PRESSURE: 138 MMHG

## 2020-09-09 DIAGNOSIS — E13.9 DIABETES 1.5, MANAGED AS TYPE 2 (HCC): ICD-10-CM

## 2020-09-09 DIAGNOSIS — R11.0 NAUSEA: Primary | ICD-10-CM

## 2020-09-09 LAB — HBA1C MFR BLD: 7.1 %

## 2020-09-09 PROCEDURE — 99213 OFFICE O/P EST LOW 20 MIN: CPT | Performed by: NURSE PRACTITIONER

## 2020-09-09 PROCEDURE — 83036 HEMOGLOBIN GLYCOSYLATED A1C: CPT | Performed by: NURSE PRACTITIONER

## 2020-09-09 RX ORDER — ONDANSETRON 4 MG/1
4 TABLET, ORALLY DISINTEGRATING ORAL EVERY 8 HOURS PRN
Qty: 20 TABLET | Refills: 0 | Status: SHIPPED | OUTPATIENT
Start: 2020-09-09 | End: 2022-09-14

## 2020-09-09 RX ORDER — AMLODIPINE BESYLATE 10 MG/1
10 TABLET ORAL DAILY
COMMUNITY
Start: 2020-07-23 | End: 2020-10-23

## 2020-09-09 RX ORDER — OXYBUTYNIN CHLORIDE 10 MG/1
10 TABLET, EXTENDED RELEASE ORAL DAILY
COMMUNITY
Start: 2020-08-27 | End: 2020-09-09

## 2020-09-09 RX ORDER — VALSARTAN 320 MG/1
320 TABLET ORAL DAILY
COMMUNITY
Start: 2020-07-30 | End: 2020-10-23

## 2020-09-09 NOTE — PROGRESS NOTES
"Subjective   Omar Can is a 60 y.o. male.     Answers for HPI/ROS submitted by the patient on 9/4/2020   What is the primary reason for your visit?: Other  Please describe your symptoms.: nausea after taking ozempic  Have you had these symptoms before?: No  How long have you been having these symptoms?: 1-4 days    Mr. Can presents today with c/o nausea after taking Ozempic. Patient recently started medication in the past month. He states he has nausea which he rates as a \"6\" on a 1-10 scale on the first day that he takes the medication which then decreases to a \"4\" on the second day. He states that the nausea resolves thereafter. He denies vomiting or diarrhea.         Nausea   This is a new problem. The current episode started 1 to 4 weeks ago. Episode frequency: occurs for 2 days after taking Ozempic. The problem has been waxing and waning. Associated symptoms include nausea. Pertinent negatives include no abdominal pain, change in bowel habit, chills, diaphoresis, fever, rash or vomiting. Treatments tried: antacid. The treatment provided mild relief.       The following portions of the patient's history were reviewed and updated as appropriate: allergies, current medications, past family history, past medical history, past social history, past surgical history and problem list.    Allergies as of 09/09/2020 - Reviewed 09/09/2020   Allergen Reaction Noted   • Cyclobenzaprine Hives 05/26/2010   • Flucloxacillin Hives 01/03/2020       Current Outpatient Medications on File Prior to Visit   Medication Sig   • amLODIPine (NORVASC) 10 MG tablet Take 10 mg by mouth Daily. 200001   • aspirin 81 MG tablet aspirin 81 mg tablet   Take 1 tablet every day by oral route.   • atorvastatin (LIPITOR) 40 MG tablet Take 1 tablet by mouth Every Night.   • carvedilol (COREG) 6.25 MG tablet Take 1 tablet by mouth 2 (Two) Times a Day.   • Coenzyme Q10 (CO Q-10) 100 MG capsule Co Q-10 100 mg capsule   Take 2 capsules every day " by oral route.   • glimepiride (AMARYL) 4 MG tablet Take 1 tablet by mouth 2 (Two) Times a Day.   • glucose blood test strip Use as instructed   • glucose monitor monitoring kit 1 each As Needed (for diabetes).   • Lancets (ONETOUCH ULTRASOFT) lancets Check daily and prn   • metFORMIN (GLUCOPHAGE) 850 MG tablet Take 1/2 tablet twice daily   • Multiple Vitamin (MULTI-VITAMIN DAILY PO) Multi Vitamin   qd   • nitroglycerin (Nitrostat) 0.3 MG SL tablet Place 0.3 mg under the tongue Every 5 (Five) Minutes As Needed for Chest Pain. Take no more than 3 doses in 15 minutes.   • pioglitazone (ACTOS) 45 MG tablet Take 1 tablet by mouth Daily.   • Semaglutide,0.25 or 0.5MG/DOS, (Ozempic, 0.25 or 0.5 MG/DOSE,) 2 MG/1.5ML solution pen-injector Inject 0.25 mg under the skin into the appropriate area as directed 1 (One) Time Per Week.   • tamsulosin (FLOMAX) 0.4 MG capsule 24 hr capsule Take 1 capsule by mouth Daily.   • triamterene-hydrochlorothiazide (MAXZIDE) 75-50 MG per tablet TAKE 1 TABLET BY MOUTH ONE TIME A DAY    • valsartan (DIOVAN) 320 MG tablet Take 320 mg by mouth Daily. 200001   • [DISCONTINUED] oxybutynin XL (DITROPAN-XL) 10 MG 24 hr tablet Take 10 mg by mouth Daily. 200001     No current facility-administered medications on file prior to visit.        Review of Systems   Constitutional: Positive for appetite change. Negative for activity change, chills, diaphoresis and fever.   Respiratory: Negative.    Cardiovascular: Negative.    Gastrointestinal: Positive for nausea. Negative for abdominal pain, change in bowel habit, constipation, diarrhea and vomiting.   Skin: Negative for rash.   Neurological: Negative.        Objective   Physical Exam   Constitutional: He is oriented to person, place, and time. Vital signs are normal. He appears well-developed and well-nourished. He is cooperative.  Non-toxic appearance. He does not have a sickly appearance. He does not appear ill. No distress.   Cardiovascular: Normal rate.    Pulmonary/Chest: Effort normal. No respiratory distress.   Neurological: He is alert and oriented to person, place, and time.   Skin: Skin is warm and dry. He is not diaphoretic.   Psychiatric: He has a normal mood and affect. His behavior is normal. Judgment and thought content normal.     Vitals:    09/09/20 0903   BP: 138/84   Pulse: 80   Resp: 18   Temp: 98.2 °F (36.8 °C)   SpO2: 98%     Body mass index is 38.6 kg/m².    Assessment/Plan   Omar was seen today for discuss meds.    Diagnoses and all orders for this visit:    Nausea  -     ondansetron ODT (ZOFRAN-ODT) 4 MG disintegrating tablet; Place 1 tablet on the tongue Every 8 (Eight) Hours As Needed for Nausea or Vomiting.    Diabetes 1.5, managed as type 2 (CMS/[facility])  -     POC Glycosylated Hemoglobin (Hb A1C)        *     POC HbA1C 7.1    Discussed medication alternatives with patient, patient wishes to continue Ozempic. He is willing to try Zofran for nausea.     Discussed the nature of the medical condition(s) risks, complications, implications, management, safe and proper use of medications. Encouraged medication compliance, and keeping scheduled follow up appointments with me and any other providers.

## 2020-09-09 NOTE — PATIENT INSTRUCTIONS
Nausea, Adult  Nausea is the feeling that you have an upset stomach or that you are about to vomit. Nausea on its own is not usually a serious concern, but it may be an early sign of a more serious medical problem. As nausea gets worse, it can lead to vomiting. If vomiting develops, or if you are not able to drink enough fluids, you are at risk of becoming dehydrated. Dehydration can make you tired and thirsty, cause you to have a dry mouth, and decrease how often you urinate. Older adults and people with other diseases or a weak disease-fighting system (immune system) are at higher risk for dehydration. The main goals of treating your nausea are:  · To relieve your nausea.  · To limit repeated nausea episodes.  · To prevent vomiting and dehydration.  Follow these instructions at home:  Watch your symptoms for any changes. Tell your health care provider about them. Follow these instructions as told by your health care provider.  Eating and drinking         · Take an oral rehydration solution (ORS). This is a drink that is sold at pharmacies and retail stores.  · Drink clear fluids slowly and in small amounts as you are able. Clear fluids include water, ice chips, low-calorie sports drinks, and fruit juice that has water added (diluted fruit juice).  · Eat bland, easy-to-digest foods in small amounts as you are able. These foods include bananas, applesauce, rice, lean meats, toast, and crackers.  · Avoid drinking fluids that contain a lot of sugar or caffeine, such as energy drinks, sports drinks, and soda.  · Avoid alcohol.  · Avoid spicy or fatty foods.  General instructions  · Take over-the-counter and prescription medicines only as told by your health care provider.  · Rest at home while you recover.  · Drink enough fluid to keep your urine pale yellow.  · Breathe slowly and deeply when you feel nauseous.  · Avoid smelling things that have strong odors.  · Wash your hands often using soap and water. If soap and  water are not available, use hand .  · Make sure that all people in your household wash their hands well and often.  · Keep all follow-up visits as told by your health care provider. This is important.  Contact a health care provider if:  · Your nausea gets worse.  · Your nausea does not go away after two days.  · You vomit.  · You cannot drink fluids without vomiting.  · You have any of the following:  ? New symptoms.  ? A fever.  ? A headache.  ? Muscle cramps.  ? A rash.  ? Pain while urinating.  · You feel light-headed or dizzy.  Get help right away if:  · You have pain in your chest, neck, arm, or jaw.  · You feel extremely weak or you faint.  · You have vomit that is bright red or looks like coffee grounds.  · You have bloody or black stools or stools that look like tar.  · You have a severe headache, a stiff neck, or both.  · You have severe pain, cramping, or bloating in your abdomen.  · You have difficulty breathing or are breathing very quickly.  · Your heart is beating very quickly.  · Your skin feels cold and clammy.  · You feel confused.  · You have signs of dehydration, such as:  ? Dark urine, very little urine, or no urine.  ? Cracked lips.  ? Dry mouth.  ? Sunken eyes.  ? Sleepiness.  ? Weakness.  These symptoms may represent a serious problem that is an emergency. Do not wait to see if the symptoms will go away. Get medical help right away. Call your local emergency services (911 in the [place] Do not drive yourself to the hospital.  Summary  · Nausea is the feeling that you have an upset stomach or that you are about to vomit. Nausea on its own is not usually a serious concern, but it may be an early sign of a more serious medical problem.  · If vomiting develops, or if you are not able to drink enough fluids, you are at risk of becoming dehydrated.  · Follow recommendations for eating and drinking and take over-the-counter and prescription medicines only as told by your health care  provider.  · Contact a health care provider right away if your symptoms worsen or you have new symptoms.  · Keep all follow-up visits as told by your health care provider. This is important.  This information is not intended to replace advice given to you by your health care provider. Make sure you discuss any questions you have with your health care provider.  Document Released: 01/25/2006 Document Revised: 05/28/2019 Document Reviewed: 05/28/2019  [employer] Patient Education © 2020 [employer]  Ondansetron oral dissolving tablet  What is this medicine?  ONDANSETRON (on DAN se dylon) is used to treat nausea and vomiting caused by chemotherapy. It is also used to prevent or treat nausea and vomiting after surgery.  This medicine may be used for other purposes; ask your health care provider or pharmacist if you have questions.  COMMON BRAND NAME(S): Zofran ODT  What should I tell my health care provider before I take this medicine?  They need to know if you have any of these conditions:  · heart disease  · history of irregular heartbeat  · liver disease  · low levels of magnesium or potassium in the blood  · an unusual or allergic reaction to ondansetron, granisetron, other medicines, foods, dyes, or preservatives  · pregnant or trying to get pregnant  · breast-feeding  How should I use this medicine?  These tablets are made to dissolve in the mouth. Do not try to push the tablet through the foil backing. With dry hands, peel away the foil backing and gently remove the tablet. Place the tablet in the mouth and allow it to dissolve, then swallow. While you may take these tablets with water, it is not necessary to do so.  Talk to your pediatrician regarding the use of this medicine in children. Special care may be needed.  Overdosage: If you think you have taken too much of this medicine contact a poison control center or emergency room at once.  NOTE: This medicine is only for you. Do not share this medicine with  others.  What if I miss a dose?  If you miss a dose, take it as soon as you can. If it is almost time for your next dose, take only that dose. Do not take double or extra doses.  What may interact with this medicine?  Do not take this medicine with any of the following medications:  · apomorphine  · certain medicines for fungal infections like fluconazole, itraconazole, ketoconazole, posaconazole, voriconazole  · cisapride  · dronedarone  · pimozide  · thioridazine  This medicine may also interact with the following medications:  · carbamazepine  · certain medicines for depression, anxiety, or psychotic disturbances  · fentanyl  · linezolid  · MAOIs like Carbex, Eldepryl, Marplan, Nardil, and Parnate  · methylene blue (injected into a vein)  · other medicines that prolong the QT interval (cause an abnormal heart rhythm) like dofetilide, ziprasidone  · phenytoin  · rifampicin  · tramadol  This list may not describe all possible interactions. Give your health care provider a list of all the medicines, herbs, non-prescription drugs, or dietary supplements you use. Also tell them if you smoke, drink alcohol, or use illegal drugs. Some items may interact with your medicine.  What should I watch for while using this medicine?  Check with your doctor or health care professional as soon as you can if you have any sign of an allergic reaction.  What side effects may I notice from receiving this medicine?  Side effects that you should report to your doctor or health care professional as soon as possible:  · allergic reactions like skin rash, itching or hives, swelling of the face, lips, or tongue  · breathing problems  · confusion  · dizziness  · fast or irregular heartbeat  · feeling faint or lightheaded, falls  · fever and chills  · loss of balance or coordination  · seizures  · sweating  · swelling of the hands and feet  · tightness in the chest  · tremors  · unusually weak or tired  Side effects that usually do not  require medical attention (report to your doctor or health care professional if they continue or are bothersome):  · constipation or diarrhea  · headache  This list may not describe all possible side effects. Call your doctor for medical advice about side effects. You may report side effects to FDA at 0-904-LGT-7037.  Where should I keep my medicine?  Keep out of the reach of children.  Store between 2 and 30 degrees C (36 and 86 degrees F). Throw away any unused medicine after the expiration date.  NOTE: This sheet is a summary. It may not cover all possible information. If you have questions about this medicine, talk to your doctor, pharmacist, or health care provider.  © 2020 [employer] (2019-12-10 07:14:10)    Diabetes Mellitus and Standards of Medical Care  Managing diabetes (diabetes mellitus) can be complicated. Your diabetes treatment may be managed by a team of health care providers, including:  · A physician who specializes in diabetes (endocrinologist).  · A nurse practitioner or physician assistant.  · Nurses.  · A diet and nutrition specialist (registered dietitian).  · A certified diabetes educator (CDE).  · An exercise specialist.  · A pharmacist.  · An eye doctor.  · A foot specialist (podiatrist).  · A dentist.  · A primary care provider.  · A mental health provider.  Your health care providers follow guidelines to help you get the best quality of care. The following schedule is a general guideline for your diabetes management plan. Your health care providers may give you more specific instructions.  Physical exams  Upon being diagnosed with diabetes mellitus, and each year after that, your health care provider will ask about your medical and family history. He or she will also do a physical exam. Your exam may include:  · Measuring your height, weight, and body mass index (BMI).  · Checking your blood pressure. This will be done at every routine medical visit. Your target blood pressure  may vary depending on your medical conditions, your age, and other factors.  · Thyroid gland exam.  · Skin exam.  · Screening for damage to your nerves (peripheral neuropathy). This may include checking the pulse in your legs and feet and checking the level of sensation in your hands and feet.  · A complete foot exam to inspect the structure and skin of your feet, including checking for cuts, bruises, redness, blisters, sores, or other problems.  · Screening for blood vessel (vascular) problems, which may include checking the pulse in your legs and feet and checking your temperature.  Blood tests  Depending on your treatment plan and your personal needs, you may have the following tests done:  · HbA1c (hemoglobin A1c). This test provides information about blood sugar (glucose) control over the previous 2-3 months. It is used to adjust your treatment plan, if needed. This test will be done:  ? At least 2 times a year, if you are meeting your treatment goals.  ? 4 times a year, if you are not meeting your treatment goals or if treatment goals have changed.  · Lipid testing, including total, LDL, and HDL cholesterol and triglyceride levels.  ? The goal for LDL is less than 100 mg/dL (5.5 mmol/L). If you are at high risk for complications, the goal is less than 70 mg/dL (3.9 mmol/L).  ? The goal for HDL is 40 mg/dL (2.2 mmol/L) or higher for men and 50 mg/dL (2.8 mmol/L) or higher for women. An HDL cholesterol of 60 mg/dL (3.3 mmol/L) or higher gives some protection against heart disease.  ? The goal for triglycerides is less than 150 mg/dL (8.3 mmol/L).  · Liver function tests.  · Kidney function tests.  · Thyroid function tests.  Dental and eye exams  · Visit your dentist two times a year.  · If you have type 1 diabetes, your health care provider may recommend an eye exam 3-5 years after you are diagnosed, and then once a year after your first exam.  ? For children with type 1 diabetes, a health care provider may  recommend an eye exam when your child is age 10 or older and has had diabetes for 3-5 years. After the first exam, your child should get an eye exam once a year.  · If you have type 2 diabetes, your health care provider may recommend an eye exam as soon as you are diagnosed, and then once a year after your first exam.  Immunizations    · The yearly flu (influenza) vaccine is recommended for everyone 6 months or older who has diabetes.  · The pneumonia (pneumococcal) vaccine is recommended for everyone 2 years or older who has diabetes. If you are 65 or older, you may get the pneumonia vaccine as a series of two separate shots.  · The hepatitis B vaccine is recommended for adults shortly after being diagnosed with diabetes.  · Adults and children with diabetes should receive all other vaccines according to age-specific recommendations from the Centers for Disease Control and Prevention (CDC).  Mental and emotional health  Screening for symptoms of eating disorders, anxiety, and depression is recommended at the time of diagnosis and afterward as needed. If your screening shows that you have symptoms (positive screening result), you may need more evaluation and you may work with a mental health care provider.  Treatment plan  Your treatment plan will be reviewed at every medical visit. You and your health care provider will discuss:  · How you are taking your medicines, including insulin.  · Any side effects you are experiencing.  · Your blood glucose target goals.  · The frequency of your blood glucose monitoring.  · Lifestyle habits, such as activity level as well as tobacco, alcohol, and substance use.  Diabetes self-management education  Your health care provider will assess how well you are monitoring your blood glucose levels and whether you are taking your insulin correctly. He or she may refer you to:  · A certified diabetes educator to manage your diabetes throughout your life, starting at diagnosis.  · A  registered dietitian who can create or review your personal nutrition plan.  · An exercise specialist who can discuss your activity level and exercise plan.  Summary  · Managing diabetes (diabetes mellitus) can be complicated. Your diabetes treatment may be managed by a team of health care providers.  · Your health care providers follow guidelines in order to help you get the best quality of care.  · Standards of care including having regular physical exams, blood tests, blood pressure monitoring, immunizations, screening tests, and education about how to manage your diabetes.  · Your health care providers may also give you more specific instructions based on your individual health.  This information is not intended to replace advice given to you by your health care provider. Make sure you discuss any questions you have with your health care provider.  Document Released: 10/15/2010 Document Revised: 09/06/2019 Document Reviewed: 09/15/2017  [employer] Patient Education © 2020 [employer]

## 2020-09-29 DIAGNOSIS — R11.0 NAUSEA: Primary | ICD-10-CM

## 2020-09-29 RX ORDER — PROMETHAZINE HYDROCHLORIDE 25 MG/1
25 TABLET ORAL EVERY 8 HOURS PRN
Qty: 20 TABLET | Refills: 0 | Status: SHIPPED | OUTPATIENT
Start: 2020-09-29 | End: 2021-02-24

## 2020-10-23 RX ORDER — AMLODIPINE BESYLATE 10 MG/1
TABLET ORAL
Qty: 90 TABLET | Refills: 0 | Status: SHIPPED | OUTPATIENT
Start: 2020-10-23 | End: 2021-02-24

## 2020-10-23 RX ORDER — VALSARTAN 320 MG/1
TABLET ORAL
Qty: 90 TABLET | Refills: 0 | Status: SHIPPED | OUTPATIENT
Start: 2020-10-23 | End: 2021-02-24

## 2020-11-03 DIAGNOSIS — E11.9 TYPE 2 DIABETES MELLITUS TREATED WITHOUT INSULIN (HCC): ICD-10-CM

## 2020-11-03 DIAGNOSIS — N18.30 STAGE 3 CHRONIC KIDNEY DISEASE (HCC): ICD-10-CM

## 2020-11-03 RX ORDER — SEMAGLUTIDE 1.34 MG/ML
0.25 INJECTION, SOLUTION SUBCUTANEOUS WEEKLY
Qty: 1 PEN | Refills: 3 | Status: SHIPPED | OUTPATIENT
Start: 2020-11-03 | End: 2021-07-13

## 2020-11-05 DIAGNOSIS — Z76.0 MEDICATION REFILL: ICD-10-CM

## 2020-11-05 DIAGNOSIS — I10 ESSENTIAL HYPERTENSION: ICD-10-CM

## 2020-11-06 RX ORDER — TRIAMTERENE AND HYDROCHLOROTHIAZIDE 75; 50 MG/1; MG/1
TABLET ORAL
Qty: 30 TABLET | Refills: 2 | Status: SHIPPED | OUTPATIENT
Start: 2020-11-06 | End: 2021-02-24

## 2021-01-22 ENCOUNTER — PRIOR AUTHORIZATION (OUTPATIENT)
Dept: FAMILY MEDICINE CLINIC | Facility: CLINIC | Age: 61
End: 2021-01-22

## 2021-02-24 ENCOUNTER — OFFICE VISIT (OUTPATIENT)
Dept: FAMILY MEDICINE CLINIC | Facility: CLINIC | Age: 61
End: 2021-02-24

## 2021-02-24 VITALS
TEMPERATURE: 98.4 F | HEIGHT: 73 IN | WEIGHT: 274 LBS | RESPIRATION RATE: 20 BRPM | DIASTOLIC BLOOD PRESSURE: 100 MMHG | HEART RATE: 73 BPM | OXYGEN SATURATION: 98 % | BODY MASS INDEX: 36.31 KG/M2 | SYSTOLIC BLOOD PRESSURE: 164 MMHG

## 2021-02-24 DIAGNOSIS — Z11.59 NEED FOR HEPATITIS C SCREENING TEST: ICD-10-CM

## 2021-02-24 DIAGNOSIS — E78.2 MIXED HYPERLIPIDEMIA: ICD-10-CM

## 2021-02-24 DIAGNOSIS — E11.9 TYPE 2 DIABETES MELLITUS WITHOUT COMPLICATION, WITHOUT LONG-TERM CURRENT USE OF INSULIN (HCC): ICD-10-CM

## 2021-02-24 DIAGNOSIS — Z00.00 ANNUAL PHYSICAL EXAM: Primary | ICD-10-CM

## 2021-02-24 DIAGNOSIS — E66.01 MORBIDLY OBESE (HCC): ICD-10-CM

## 2021-02-24 PROBLEM — N13.30 HYDRONEPHROSIS: Status: ACTIVE | Noted: 2020-06-28

## 2021-02-24 PROBLEM — N18.30 STAGE 3 CHRONIC KIDNEY DISEASE: Chronic | Status: ACTIVE | Noted: 2020-08-19

## 2021-02-24 PROBLEM — Z95.810 BIVENTRICULAR IMPLANTABLE CARDIOVERTER-DEFIBRILLATOR (ICD) IN SITU: Chronic | Status: ACTIVE | Noted: 2020-06-28

## 2021-02-24 PROBLEM — G47.33 OBSTRUCTIVE SLEEP APNEA SYNDROME: Chronic | Status: ACTIVE | Noted: 2019-01-15

## 2021-02-24 PROBLEM — E13.9 DIABETES 1.5, MANAGED AS TYPE 2 (HCC): Status: RESOLVED | Noted: 2020-06-28 | Resolved: 2021-02-24

## 2021-02-24 PROBLEM — I25.10 CAD (CORONARY ARTERY DISEASE): Chronic | Status: ACTIVE | Noted: 2020-06-28

## 2021-02-24 LAB
BILIRUB BLD-MCNC: NEGATIVE MG/DL
CLARITY, POC: CLEAR
COLOR UR: YELLOW
EXPIRATION DATE: ABNORMAL
EXPIRATION DATE: NORMAL
GLUCOSE UR STRIP-MCNC: NEGATIVE MG/DL
KETONES UR QL: ABNORMAL
LEUKOCYTE EST, POC: NEGATIVE
Lab: 5066
Lab: ABNORMAL
NITRITE UR-MCNC: NEGATIVE MG/ML
PH UR: 6 [PH] (ref 5–8)
POC CREATININE URINE: NORMAL
POC MICROALBUMIN URINE: NORMAL
PROT UR STRIP-MCNC: NEGATIVE MG/DL
RBC # UR STRIP: NEGATIVE /UL
SP GR UR: 1.02 (ref 1–1.03)
UROBILINOGEN UR QL: NORMAL

## 2021-02-24 PROCEDURE — 82044 UR ALBUMIN SEMIQUANTITATIVE: CPT | Performed by: NURSE PRACTITIONER

## 2021-02-24 PROCEDURE — 81003 URINALYSIS AUTO W/O SCOPE: CPT | Performed by: NURSE PRACTITIONER

## 2021-02-24 PROCEDURE — 99396 PREV VISIT EST AGE 40-64: CPT | Performed by: NURSE PRACTITIONER

## 2021-02-24 RX ORDER — AMLODIPINE AND VALSARTAN 10; 320 MG/1; MG/1
1 TABLET ORAL DAILY
COMMUNITY
End: 2022-02-24

## 2021-02-24 RX ORDER — HYDRALAZINE HYDROCHLORIDE 50 MG/1
TABLET, FILM COATED ORAL EVERY 12 HOURS SCHEDULED
COMMUNITY
End: 2022-02-24

## 2021-02-24 RX ORDER — OXYBUTYNIN CHLORIDE 10 MG/1
10 TABLET, EXTENDED RELEASE ORAL NIGHTLY
COMMUNITY

## 2021-02-24 RX ORDER — FUROSEMIDE 20 MG/1
20 TABLET ORAL AS NEEDED
COMMUNITY
Start: 2020-12-16

## 2021-02-24 RX ORDER — POTASSIUM GLUCONATE 2 MEQ
2 TABLET ORAL DAILY
COMMUNITY

## 2021-02-24 NOTE — PATIENT INSTRUCTIONS
Health Maintenance, Male  Adopting a healthy lifestyle and getting preventive care are important in promoting health and wellness. Ask your health care provider about:  · The right schedule for you to have regular tests and exams.  · Things you can do on your own to prevent diseases and keep yourself healthy.  What should I know about diet, weight, and exercise?  Eat a healthy diet    · Eat a diet that includes plenty of vegetables, fruits, low-fat dairy products, and lean protein.  · Do not eat a lot of foods that are high in solid fats, added sugars, or sodium.  Maintain a healthy weight  Body mass index (BMI) is a measurement that can be used to identify possible weight problems. It estimates body fat based on height and weight. Your health care provider can help determine your BMI and help you achieve or maintain a healthy weight.  Get regular exercise  Get regular exercise. This is one of the most important things you can do for your health. Most adults should:  · Exercise for at least 150 minutes each week. The exercise should increase your heart rate and make you sweat (moderate-intensity exercise).  · Do strengthening exercises at least twice a week. This is in addition to the moderate-intensity exercise.  · Spend less time sitting. Even light physical activity can be beneficial.  Watch cholesterol and blood lipids  Have your blood tested for lipids and cholesterol at 20 years of age, then have this test every 5 years.  You may need to have your cholesterol levels checked more often if:  · Your lipid or cholesterol levels are high.  · You are older than 40 years of age.  · You are at high risk for heart disease.  What should I know about cancer screening?  Many types of cancers can be detected early and may often be prevented. Depending on your health history and family history, you may need to have cancer screening at various ages. This may include screening for:  · Colorectal cancer.  · Prostate  cancer.  · Skin cancer.  · Lung cancer.  What should I know about heart disease, diabetes, and high blood pressure?  Blood pressure and heart disease  · High blood pressure causes heart disease and increases the risk of stroke. This is more likely to develop in people who have high blood pressure readings, are of  descent, or are overweight.  · Talk with your health care provider about your target blood pressure readings.  · Have your blood pressure checked:  ? Every 3-5 years if you are 18-39 years of age.  ? Every year if you are 40 years old or older.  · If you are between the ages of 65 and 75 and are a current or former smoker, ask your health care provider if you should have a one-time screening for abdominal aortic aneurysm (AAA).  Diabetes  Have regular diabetes screenings. This checks your fasting blood sugar level. Have the screening done:  · Once every three years after age 45 if you are at a normal weight and have a low risk for diabetes.  · More often and at a younger age if you are overweight or have a high risk for diabetes.  What should I know about preventing infection?  Hepatitis B  If you have a higher risk for hepatitis B, you should be screened for this virus. Talk with your health care provider to find out if you are at risk for hepatitis B infection.  Hepatitis C  Blood testing is recommended for:  · Everyone born from 1945 through 1965.  · Anyone with known risk factors for hepatitis C.  Sexually transmitted infections (STIs)  · You should be screened each year for STIs, including gonorrhea and chlamydia, if:  ? You are sexually active and are younger than 24 years of age.  ? You are older than 24 years of age and your health care provider tells you that you are at risk for this type of infection.  ? Your sexual activity has changed since you were last screened, and you are at increased risk for chlamydia or gonorrhea. Ask your health care provider if you are at risk.  · Ask your  health care provider about whether you are at high risk for HIV. Your health care provider may recommend a prescription medicine to help prevent HIV infection. If you choose to take medicine to prevent HIV, you should first get tested for HIV. You should then be tested every 3 months for as long as you are taking the medicine.  Follow these instructions at home:  Lifestyle  · Do not use any products that contain nicotine or tobacco, such as cigarettes, e-cigarettes, and chewing tobacco. If you need help quitting, ask your health care provider.  · Do not use street drugs.  · Do not share needles.  · Ask your health care provider for help if you need support or information about quitting drugs.  Alcohol use  · Do not drink alcohol if your health care provider tells you not to drink.  · If you drink alcohol:  ? Limit how much you have to 0-2 drinks a day.  ? Be aware of how much alcohol is in your drink. In the [place] one drink equals one 12 oz bottle of beer (355 mL), one 5 oz glass of wine (148 mL), or one 1½ oz glass of hard liquor (44 mL).  General instructions  · Schedule regular health, dental, and eye exams.  · Stay current with your vaccines.  · Tell your health care provider if:  ? You often feel depressed.  ? You have ever been abused or do not feel safe at home.  Summary  · Adopting a healthy lifestyle and getting preventive care are important in promoting health and wellness.  · Follow your health care provider's instructions about healthy diet, exercising, and getting tested or screened for diseases.  · Follow your health care provider's instructions on monitoring your cholesterol and blood pressure.  This information is not intended to replace advice given to you by your health care provider. Make sure you discuss any questions you have with your health care provider.  Document Revised: 12/11/2019 Document Reviewed: 12/11/2019  Elsevier Patient Education © 2020 [employer]    Preventive Care 40-64 Years  Old, Male  Preventive care refers to lifestyle choices and visits with your health care provider that can promote health and wellness. This includes:  · A yearly physical exam. This is also called an annual well check.  · Regular dental and eye exams.  · Immunizations.  · Screening for certain conditions.  · Healthy lifestyle choices, such as eating a healthy diet, getting regular exercise, not using drugs or products that contain nicotine and tobacco, and limiting alcohol use.  What can I expect for my preventive care visit?  Physical exam  Your health care provider will check:  · Height and weight. These may be used to calculate body mass index (BMI), which is a measurement that tells if you are at a healthy weight.  · Heart rate and blood pressure.  · Your skin for abnormal spots.  Counseling  Your health care provider may ask you questions about:  · Alcohol, tobacco, and drug use.  · Emotional well-being.  · Home and relationship well-being.  · Sexual activity.  · Eating habits.  · Work and work environment.  What immunizations do I need?    Influenza (flu) vaccine  · This is recommended every year.  Tetanus, diphtheria, and pertussis (Tdap) vaccine  · You may need a Td booster every 10 years.  Varicella (chickenpox) vaccine  · You may need this vaccine if you have not already been vaccinated.  Zoster (shingles) vaccine  · You may need this after age 60.  Measles, mumps, and rubella (MMR) vaccine  · You may need at least one dose of MMR if you were born in 1957 or later. You may also need a second dose.  Pneumococcal conjugate (PCV13) vaccine  · You may need this if you have certain conditions and were not previously vaccinated.  Pneumococcal polysaccharide (PPSV23) vaccine  · You may need one or two doses if you smoke cigarettes or if you have certain conditions.  Meningococcal conjugate (MenACWY) vaccine  · You may need this if you have certain conditions.  Hepatitis A vaccine  · You may need this if you have  certain conditions or if you travel or work in places where you may be exposed to hepatitis A.  Hepatitis B vaccine  · You may need this if you have certain conditions or if you travel or work in places where you may be exposed to hepatitis B.  Haemophilus influenzae type b (Hib) vaccine  · You may need this if you have certain risk factors.  Human papillomavirus (HPV) vaccine  · If recommended by your health care provider, you may need three doses over 6 months.  You may receive vaccines as individual doses or as more than one vaccine together in one shot (combination vaccines). Talk with your health care provider about the risks and benefits of combination vaccines.  What tests do I need?  Blood tests  · Lipid and cholesterol levels. These may be checked every 5 years, or more frequently if you are over 50 years old.  · Hepatitis C test.  · Hepatitis B test.  Screening  · Lung cancer screening. You may have this screening every year starting at age 55 if you have a 30-pack-year history of smoking and currently smoke or have quit within the past 15 years.  · Prostate cancer screening. Recommendations will vary depending on your family history and other risks.  · Colorectal cancer screening. All adults should have this screening starting at age 50 and continuing until age 75. Your health care provider may recommend screening at age 45 if you are at increased risk. You will have tests every 1-10 years, depending on your results and the type of screening test.  · Diabetes screening. This is done by checking your blood sugar (glucose) after you have not eaten for a while (fasting). You may have this done every 1-3 years.  · Sexually transmitted disease (STD) testing.  Follow these instructions at home:  Eating and drinking  · Eat a diet that includes fresh fruits and vegetables, whole grains, lean protein, and low-fat dairy products.  · Take vitamin and mineral supplements as recommended by your health care  provider.  · Do not drink alcohol if your health care provider tells you not to drink.  · If you drink alcohol:  ? Limit how much you have to 0-2 drinks a day.  ? Be aware of how much alcohol is in your drink. In the [place] one drink equals one 12 oz bottle of beer (355 mL), one 5 oz glass of wine (148 mL), or one 1½ oz glass of hard liquor (44 mL).  Lifestyle  · Take daily care of your teeth and gums.  · Stay active. Exercise for at least 30 minutes on 5 or more days each week.  · Do not use any products that contain nicotine or tobacco, such as cigarettes, e-cigarettes, and chewing tobacco. If you need help quitting, ask your health care provider.  · If you are sexually active, practice safe sex. Use a condom or other form of protection to prevent STIs (sexually transmitted infections).  · Talk with your health care provider about taking a low-dose aspirin every day starting at age 50.  What's next?  · Go to your health care provider once a year for a well check visit.  · Ask your health care provider how often you should have your eyes and teeth checked.  · Stay up to date on all vaccines.  This information is not intended to replace advice given to you by your health care provider. Make sure you discuss any questions you have with your health care provider.  Document Revised: 12/12/2019 Document Reviewed: 12/12/2019  Elsevier Patient Education © 2020 [employer]    Immunization Schedule, 50-64 Years Old    Vaccines are usually given at various ages, according to a schedule. Your health care provider will recommend vaccines for you based on your age, medical history, and lifestyle or other factors such as travel or where you work.  You may receive vaccines as individual doses or as more than one vaccine together in one shot (combination vaccines). Talk with your health care provider about the risks and benefits of combination vaccines.  Recommended immunizations for 50-64 years old  Influenza vaccine  · You  should get a dose of the influenza vaccine every year.  Tetanus, diphtheria, and pertussis vaccine  A vaccine that protects against tetanus, diphtheria, and pertussis is known as the Tdap vaccine. A vaccine that protects against tetanus and diphtheria is known as the Td vaccine.  · You should only get the Td vaccine if you have had at least 1 dose of the Tdap vaccine.  · You should get 1 dose of the Td or Tdap vaccine every 10 years, or you should get 1 dose of the Tdap vaccine if:  ? You have not previously gotten a Tdap vaccine.  ? You do not know if you have ever gotten a Tdap vaccine.  Zoster vaccine  This is also known as the RZV vaccine. You should get 2 doses of the RZV vaccine 2 to 6 months apart. It is important to get the RZV vaccine even if you:  · Have had shingles.  · Have received the ZVL vaccine, an older version of the RZV vaccine.  · Are unsure if you have had chickenpox (varicella).  Pneumococcal conjugate vaccine  This is also known as the PCV13 vaccine. You should get the PCV13 vaccine as recommended if you have certain high-risk conditions. These include:  · Diabetes.  · Chronic conditions of the heart, lungs, or liver.  · Conditions that affect the body's disease-fighting system (immune system).  Pneumococcal polysaccharide vaccine  This is also known as the PPSV23 vaccine. You should get the PPSV23 vaccine as recommended if you have certain high-risk conditions. These include:  · Diabetes.  · Chronic conditions of the heart, lungs, or liver.  · Conditions that affect the immune system.  Hepatitis A vaccine  This is also known as the HepA vaccine. If you did not get the HepA vaccine previously, you should get it if:  · You are at risk for a hepatitis A infection. You may be at risk for infection if you:  ? Have chronic liver disease.  ? Have HIV or AIDS.  ? Are a man who has sex with men.  ? Use drugs.  ? Are homeless.  ? May be exposed to hepatitis A through work.  ? Travel to countries where  hepatitis A is common.  ? Have or will have close contact with someone who was adopted from another country.  · You are not at risk for infection but want protection from hepatitis A.  Hepatitis B vaccine  This is also known as the HepB vaccine. If you did not get the HepB vaccine previously, you should get it if:  · You are at risk for hepatitis B infection. You are at risk if you:  ? Have chronic liver disease.  ? Have HIV or AIDS.  ? Have sex with a partner who has hepatitis B, or:  § You have multiple sex partners.  § You are a man who has sex with men.  ? Use drugs.  ? May be exposed to hepatitis B through work.  ? Live with someone who has hepatitis B.  ? Receive dialysis treatment.  ? Have diabetes.  ? Travel to countries where hepatitis B is common.  · You are not at risk of infection but want protection from hepatitis B.  Measles, mumps, and rubella vaccine  This is also known as the MMR vaccine. You may need to get the MMR vaccine if you were born in 1957 or later and:  · You need to catch up on doses you missed in the past.  · You have not been given the vaccine before.  · You do not have evidence of immunity (by a blood test).  Varicella vaccine  This is also known as the LYLE vaccine. You may need to get the LYLE vaccine if you do not have evidence of immunity (by a blood test) and you may be exposed to varicella through work. This is especially important if you work in a health care setting.  Meningococcal conjugate vaccine  This is also known as the MenACWY vaccine. You may need to get the MenACWY vaccine if you:  · Have not been given the vaccine before.  · Need to catch up on doses you missed in the past.  This vaccine is especially important if you:  · Do not have a spleen.  · Have sickle cell disease.  · Have HIV.  · Take medicines that suppress your immune system.  · Travel to countries where meningococcal disease is common.  · Are exposed to Neisseria meningitidis at work.  Serogroup B  meningococcal vaccine  This is also known as the MenB vaccine. You may need to get the MenB vaccine if you:  · Have not been given the vaccine before.  · Need to catch up on doses you missed in the past.  This vaccine is especially important if you:  · Do not have a spleen.  · Have sickle cell disease.  · Take medicines that suppress your immune system.  · Are exposed to Neisseria meningitidis at work.  Haemophilus influenzae type b vaccine  This is also known as the Hib vaccine. Anyone older than 5 years of age is usually not given the Hib vaccine. However, if you have certain high-risk conditions, you may need to get this vaccine. These conditions include:  · Not having a spleen.  · Having received a stem cell transplant.  Before you get a vaccine:  Talk with your health care provider about which vaccines are right for you. This is especially important if:  · You previously had a reaction after getting a vaccine.  · You have a weakened immune system. You may have a weakened immune system if you:  ? Are taking medicines that reduce (suppress) the activity of your immune system.  ? Are taking medicines to treat cancer (chemotherapy).  ? Have HIV or AIDS.  · You work in an environment where you may be exposed to a disease.  · You plan to travel outside of the country.  · You have a chronic illness, such as heart disease, kidney disease, diabetes, or lung disease.  Summary  · Before you get a vaccine, tell your health care provider if you have reacted to vaccines in the past or have a condition that weakens your immune system.  · At 50-64 years, you should get a dose of the flu vaccine every year and a dose of the Td or Tdap vaccine every 10 years.  · You should get 2 doses of the RZV vaccine 2 to 6 months apart.  · Depending on your medical history and your risk factors, you may need other vaccines. Ask your health care provider whether you are up to date on all your vaccines.  This information is not intended to  replace advice given to you by your health care provider. Make sure you discuss any questions you have with your health care provider.  Document Revised: 10/13/2020 Document Reviewed: 10/13/2020  Elsevier Patient Education © 2020 [employer]    Diabetes Mellitus and Standards of Medical Care  Managing diabetes (diabetes mellitus) can be complicated. Your diabetes treatment may be managed by a team of health care providers, including:  · A physician who specializes in diabetes (endocrinologist).  · A nurse practitioner or physician assistant.  · Nurses.  · A diet and nutrition specialist (registered dietitian).  · A certified diabetes educator (CDE).  · An exercise specialist.  · A pharmacist.  · An eye doctor.  · A foot specialist (podiatrist).  · A dentist.  · A primary care provider.  · A mental health provider.  Your health care providers follow guidelines to help you get the best quality of care. The following schedule is a general guideline for your diabetes management plan. Your health care providers may give you more specific instructions.  Physical exams  Upon being diagnosed with diabetes mellitus, and each year after that, your health care provider will ask about your medical and family history. He or she will also do a physical exam. Your exam may include:  · Measuring your height, weight, and body mass index (BMI).  · Checking your blood pressure. This will be done at every routine medical visit. Your target blood pressure may vary depending on your medical conditions, your age, and other factors.  · Thyroid gland exam.  · Skin exam.  · Screening for damage to your nerves (peripheral neuropathy). This may include checking the pulse in your legs and feet and checking the level of sensation in your hands and feet.  · A complete foot exam to inspect the structure and skin of your feet, including checking for cuts, bruises, redness, blisters, sores, or other problems.  · Screening for blood vessel  (vascular) problems, which may include checking the pulse in your legs and feet and checking your temperature.  Blood tests  Depending on your treatment plan and your personal needs, you may have the following tests done:  · HbA1c (hemoglobin A1c). This test provides information about blood sugar (glucose) control over the previous 2-3 months. It is used to adjust your treatment plan, if needed. This test will be done:  ? At least 2 times a year, if you are meeting your treatment goals.  ? 4 times a year, if you are not meeting your treatment goals or if treatment goals have changed.  · Lipid testing, including total, LDL, and HDL cholesterol and triglyceride levels.  ? The goal for LDL is less than 100 mg/dL (5.5 mmol/L). If you are at high risk for complications, the goal is less than 70 mg/dL (3.9 mmol/L).  ? The goal for HDL is 40 mg/dL (2.2 mmol/L) or higher for men and 50 mg/dL (2.8 mmol/L) or higher for women. An HDL cholesterol of 60 mg/dL (3.3 mmol/L) or higher gives some protection against heart disease.  ? The goal for triglycerides is less than 150 mg/dL (8.3 mmol/L).  · Liver function tests.  · Kidney function tests.  · Thyroid function tests.  Dental and eye exams  · Visit your dentist two times a year.  · If you have type 1 diabetes, your health care provider may recommend an eye exam 3-5 years after you are diagnosed, and then once a year after your first exam.  ? For children with type 1 diabetes, a health care provider may recommend an eye exam when your child is age 10 or older and has had diabetes for 3-5 years. After the first exam, your child should get an eye exam once a year.  · If you have type 2 diabetes, your health care provider may recommend an eye exam as soon as you are diagnosed, and then once a year after your first exam.  Immunizations    · The yearly flu (influenza) vaccine is recommended for everyone 6 months or older who has diabetes.  · The pneumonia (pneumococcal) vaccine is  recommended for everyone 2 years or older who has diabetes. If you are 65 or older, you may get the pneumonia vaccine as a series of two separate shots.  · The hepatitis B vaccine is recommended for adults shortly after being diagnosed with diabetes.  · Adults and children with diabetes should receive all other vaccines according to age-specific recommendations from the Centers for Disease Control and Prevention (CDC).  Mental and emotional health  Screening for symptoms of eating disorders, anxiety, and depression is recommended at the time of diagnosis and afterward as needed. If your screening shows that you have symptoms (positive screening result), you may need more evaluation and you may work with a mental health care provider.  Treatment plan  Your treatment plan will be reviewed at every medical visit. You and your health care provider will discuss:  · How you are taking your medicines, including insulin.  · Any side effects you are experiencing.  · Your blood glucose target goals.  · The frequency of your blood glucose monitoring.  · Lifestyle habits, such as activity level as well as tobacco, alcohol, and substance use.  Diabetes self-management education  Your health care provider will assess how well you are monitoring your blood glucose levels and whether you are taking your insulin correctly. He or she may refer you to:  · A certified diabetes educator to manage your diabetes throughout your life, starting at diagnosis.  · A registered dietitian who can create or review your personal nutrition plan.  · An exercise specialist who can discuss your activity level and exercise plan.  Summary  · Managing diabetes (diabetes mellitus) can be complicated. Your diabetes treatment may be managed by a team of health care providers.  · Your health care providers follow guidelines in order to help you get the best quality of care.  · Standards of care including having regular physical exams, blood tests, blood  "pressure monitoring, immunizations, screening tests, and education about how to manage your diabetes.  · Your health care providers may also give you more specific instructions based on your individual health.  This information is not intended to replace advice given to you by your health care provider. Make sure you discuss any questions you have with your health care provider.  Document Revised: 09/06/2019 Document Reviewed: 09/15/2017  Elsevier Patient Education © 2020 [employer]    Hypertension, Adult  High blood pressure (hypertension) is when the force of blood pumping through the arteries is too strong. The arteries are the blood vessels that carry blood from the heart throughout the body. Hypertension forces the heart to work harder to pump blood and may cause arteries to become narrow or stiff. Untreated or uncontrolled hypertension can cause a heart attack, heart failure, a stroke, kidney disease, and other problems.  A blood pressure reading consists of a higher number over a lower number. Ideally, your blood pressure should be below 120/80. The first (\"top\") number is called the systolic pressure. It is a measure of the pressure in your arteries as your heart beats. The second (\"bottom\") number is called the diastolic pressure. It is a measure of the pressure in your arteries as the heart relaxes.  What are the causes?  The exact cause of this condition is not known. There are some conditions that result in or are related to high blood pressure.  What increases the risk?  Some risk factors for high blood pressure are under your control. The following factors may make you more likely to develop this condition:  · Smoking.  · Having type 2 diabetes mellitus, high cholesterol, or both.  · Not getting enough exercise or physical activity.  · Being overweight.  · Having too much fat, sugar, calories, or salt (sodium) in your diet.  · Drinking too much alcohol.  Some risk factors for high blood pressure may " be difficult or impossible to change. Some of these factors include:  · Having chronic kidney disease.  · Having a family history of high blood pressure.  · Age. Risk increases with age.  · Race. You may be at higher risk if you are .  · Gender. Men are at higher risk than women before age 45. After age 65, women are at higher risk than men.  · Having obstructive sleep apnea.  · Stress.  What are the signs or symptoms?  High blood pressure may not cause symptoms. Very high blood pressure (hypertensive crisis) may cause:  · Headache.  · Anxiety.  · Shortness of breath.  · Nosebleed.  · Nausea and vomiting.  · Vision changes.  · Severe chest pain.  · Seizures.  How is this diagnosed?  This condition is diagnosed by measuring your blood pressure while you are seated, with your arm resting on a flat surface, your legs uncrossed, and your feet flat on the floor. The cuff of the blood pressure monitor will be placed directly against the skin of your upper arm at the level of your heart. It should be measured at least twice using the same arm. Certain conditions can cause a difference in blood pressure between your right and left arms.  Certain factors can cause blood pressure readings to be lower or higher than normal for a short period of time:  · When your blood pressure is higher when you are in a health care provider's office than when you are at home, this is called white coat hypertension. Most people with this condition do not need medicines.  · When your blood pressure is higher at home than when you are in a health care provider's office, this is called masked hypertension. Most people with this condition may need medicines to control blood pressure.  If you have a high blood pressure reading during one visit or you have normal blood pressure with other risk factors, you may be asked to:  · Return on a different day to have your blood pressure checked again.  · Monitor your blood pressure at home  for 1 week or longer.  If you are diagnosed with hypertension, you may have other blood or imaging tests to help your health care provider understand your overall risk for other conditions.  How is this treated?  This condition is treated by making healthy lifestyle changes, such as eating healthy foods, exercising more, and reducing your alcohol intake. Your health care provider may prescribe medicine if lifestyle changes are not enough to get your blood pressure under control, and if:  · Your systolic blood pressure is above 130.  · Your diastolic blood pressure is above 80.  Your personal target blood pressure may vary depending on your medical conditions, your age, and other factors.  Follow these instructions at home:  Eating and drinking    · Eat a diet that is high in fiber and potassium, and low in sodium, added sugar, and fat. An example eating plan is called the DASH (Dietary Approaches to Stop Hypertension) diet. To eat this way:  ? Eat plenty of fresh fruits and vegetables. Try to fill one half of your plate at each meal with fruits and vegetables.  ? Eat whole grains, such as whole-wheat pasta, brown rice, or whole-grain bread. Fill about one fourth of your plate with whole grains.  ? Eat or drink low-fat dairy products, such as skim milk or low-fat yogurt.  ? Avoid fatty cuts of meat, processed or cured meats, and poultry with skin. Fill about one fourth of your plate with lean proteins, such as fish, chicken without skin, beans, eggs, or tofu.  ? Avoid pre-made and processed foods. These tend to be higher in sodium, added sugar, and fat.  · Reduce your daily sodium intake. Most people with hypertension should eat less than 1,500 mg of sodium a day.  · Do not drink alcohol if:  ? Your health care provider tells you not to drink.  ? You are pregnant, may be pregnant, or are planning to become pregnant.  · If you drink alcohol:  ? Limit how much you use to:  § 0-1 drink a day for women.  § 0-2 drinks a  day for men.  ? Be aware of how much alcohol is in your drink. In the [place] one drink equals one 12 oz bottle of beer (355 mL), one 5 oz glass of wine (148 mL), or one 1½ oz glass of hard liquor (44 mL).  Lifestyle    · Work with your health care provider to maintain a healthy body weight or to lose weight. Ask what an ideal weight is for you.  · Get at least 30 minutes of exercise most days of the week. Activities may include walking, swimming, or biking.  · Include exercise to strengthen your muscles (resistance exercise), such as Pilates or lifting weights, as part of your weekly exercise routine. Try to do these types of exercises for 30 minutes at least 3 days a week.  · Do not use any products that contain nicotine or tobacco, such as cigarettes, e-cigarettes, and chewing tobacco. If you need help quitting, ask your health care provider.  · Monitor your blood pressure at home as told by your health care provider.  · Keep all follow-up visits as told by your health care provider. This is important.  Medicines  · Take over-the-counter and prescription medicines only as told by your health care provider. Follow directions carefully. Blood pressure medicines must be taken as prescribed.  · Do not skip doses of blood pressure medicine. Doing this puts you at risk for problems and can make the medicine less effective.  · Ask your health care provider about side effects or reactions to medicines that you should watch for.  Contact a health care provider if you:  · Think you are having a reaction to a medicine you are taking.  · Have headaches that keep coming back (recurring).  · Feel dizzy.  · Have swelling in your ankles.  · Have trouble with your vision.  Get help right away if you:  · Develop a severe headache or confusion.  · Have unusual weakness or numbness.  · Feel faint.  · Have severe pain in your chest or abdomen.  · Vomit repeatedly.  · Have trouble breathing.  Summary  · Hypertension is when the force of  blood pumping through your arteries is too strong. If this condition is not controlled, it may put you at risk for serious complications.  · Your personal target blood pressure may vary depending on your medical conditions, your age, and other factors. For most people, a normal blood pressure is less than 120/80.  · Hypertension is treated with lifestyle changes, medicines, or a combination of both. Lifestyle changes include losing weight, eating a healthy, low-sodium diet, exercising more, and limiting alcohol.  This information is not intended to replace advice given to you by your health care provider. Make sure you discuss any questions you have with your health care provider.  Document Revised: 08/28/2019 Document Reviewed: 08/28/2019  [employer] Patient Education © 2020 [employer]    Dyslipidemia  Dyslipidemia is an imbalance of waxy, fat-like substances (lipids) in the blood. The body needs lipids in small amounts. Dyslipidemia often involves a high level of cholesterol or triglycerides, which are types of lipids.  Common forms of dyslipidemia include:  · High levels of LDL cholesterol. LDL is the type of cholesterol that causes fatty deposits (plaques) to build up in the blood vessels that carry blood away from your heart (arteries).  · Low levels of HDL cholesterol. HDL cholesterol is the type of cholesterol that protects against heart disease. High levels of HDL remove the LDL buildup from arteries.  · High levels of triglycerides. Triglycerides are a fatty substance in the blood that is linked to a buildup of plaques in the arteries.  What are the causes?  Primary dyslipidemia is caused by changes (mutations) in genes that are passed down through families (inherited). These mutations cause several types of dyslipidemia.  Secondary dyslipidemia is caused by lifestyle choices and diseases that lead to dyslipidemia, such as:  · Eating a diet that is high in animal fat.  · Not getting enough  exercise.  · Having diabetes, kidney disease, liver disease, or thyroid disease.  · Drinking large amounts of alcohol.  · Using certain medicines.  What increases the risk?  You are more likely to develop this condition if you are an older man or if you are a woman who has gone through menopause. Other risk factors include:  · Having a family history of dyslipidemia.  · Taking certain medicines, including birth control pills, steroids, some diuretics, and beta-blockers.  · Smoking cigarettes.  · Eating a high-fat diet.  · Having certain medical conditions such as diabetes, polycystic ovary syndrome (PCOS), kidney disease, liver disease, or hypothyroidism.  · Not exercising regularly.  · Being overweight or obese with too much belly fat.  What are the signs or symptoms?  In most cases, dyslipidemia does not usually cause any symptoms.  In severe cases, very high lipid levels can cause:  · Fatty bumps under the skin (xanthomas).  · White or gray ring around the black center (pupil) of the eye.  Very high triglyceride levels can cause inflammation of the pancreas (pancreatitis).  How is this diagnosed?  Your health care provider may diagnose dyslipidemia based on a routine blood test (fasting blood test). Because most people do not have symptoms of the condition, this blood testing (lipid profile) is done on adults age 20 and older and is repeated every 5 years. This test checks:  · Total cholesterol. This measures the total amount of cholesterol in your blood, including LDL cholesterol, HDL cholesterol, and triglycerides. A healthy number is below 200.  · LDL cholesterol. The target number for LDL cholesterol is different for each person, depending on individual risk factors. Ask your health care provider what your LDL cholesterol should be.  · HDL cholesterol. An HDL level of 60 or higher is best because it helps to protect against heart disease. A number below 40 for men or below 50 for women increases the risk for  heart disease.  · Triglycerides. A healthy triglyceride number is below 150.  If your lipid profile is abnormal, your health care provider may do other blood tests.  How is this treated?  Treatment depends on the type of dyslipidemia that you have and your other risk factors for heart disease and stroke. Your health care provider will have a target range for your lipid levels based on this information.  For many people, this condition may be treated by lifestyle changes, such as diet and exercise. Your health care provider may recommend that you:  · Get regular exercise.  · Make changes to your diet.  · Quit smoking if you smoke.  If diet changes and exercise do not help you reach your goals, your health care provider may also prescribe medicine to lower lipids. The most commonly prescribed type of medicine lowers your LDL cholesterol (statin drug). If you have a high triglyceride level, your provider may prescribe another type of drug (fibrate) or an omega-3 fish oil supplement, or both.  Follow these instructions at home:    Eating and drinking  · Follow instructions from your health care provider or dietitian about eating or drinking restrictions.  · Eat a healthy diet as told by your health care provider. This can help you reach and maintain a healthy weight, lower your LDL cholesterol, and raise your HDL cholesterol. This may include:  ? Limiting your calories, if you are overweight.  ? Eating more fruits, vegetables, whole grains, fish, and lean meats.  ? Limiting saturated fat, trans fat, and cholesterol.  · If you drink alcohol:  ? Limit how much you use.  ? Be aware of how much alcohol is in your drink. In the [place] one drink equals one 12 oz bottle of beer (355 mL), one 5 oz glass of wine (148 mL), or one 1½ oz glass of hard liquor (44 mL).  · Do not drink alcohol if:  ? Your health care provider tells you not to drink.  ? You are pregnant, may be pregnant, or are planning to become  pregnant.  Activity  · Get regular exercise. Start an exercise and strength training program as told by your health care provider. Ask your health care provider what activities are safe for you. Your health care provider may recommend:  ? 30 minutes of aerobic activity 4-6 days a week. Brisk walking is an example of aerobic activity.  ? Strength training 2 days a week.  General instructions  · Do not use any products that contain nicotine or tobacco, such as cigarettes, e-cigarettes, and chewing tobacco. If you need help quitting, ask your health care provider.  · Take over-the-counter and prescription medicines only as told by your health care provider. This includes supplements.  · Keep all follow-up visits as told by your health care provider.  Contact a health care provider if:  · You are:  ? Having trouble sticking to your exercise or diet plan.  ? Struggling to quit smoking or control your use of alcohol.  Summary  · Dyslipidemia often involves a high level of cholesterol or triglycerides, which are types of lipids.  · Treatment depends on the type of dyslipidemia that you have and your other risk factors for heart disease and stroke.  · For many people, treatment starts with lifestyle changes, such as diet and exercise.  · Your health care provider may prescribe medicine to lower lipids.  This information is not intended to replace advice given to you by your health care provider. Make sure you discuss any questions you have with your health care provider.  Document Revised: 08/12/2019 Document Reviewed: 07/19/2019  [employer] Patient Education © 2020 [employer]

## 2021-02-25 LAB
ALBUMIN SERPL-MCNC: 4.7 G/DL (ref 3.5–5.2)
ALBUMIN/GLOB SERPL: 2.4 G/DL
ALP SERPL-CCNC: 95 U/L (ref 39–117)
ALT SERPL-CCNC: 58 U/L (ref 1–41)
AST SERPL-CCNC: 43 U/L (ref 1–40)
BASOPHILS # BLD AUTO: 0.05 10*3/MM3 (ref 0–0.2)
BASOPHILS NFR BLD AUTO: 1 % (ref 0–1.5)
BILIRUB SERPL-MCNC: 0.4 MG/DL (ref 0–1.2)
BUN SERPL-MCNC: 21 MG/DL (ref 8–23)
BUN/CREAT SERPL: 17.1 (ref 7–25)
CALCIUM SERPL-MCNC: 10.1 MG/DL (ref 8.6–10.5)
CHLORIDE SERPL-SCNC: 103 MMOL/L (ref 98–107)
CHOLEST SERPL-MCNC: 148 MG/DL (ref 0–200)
CO2 SERPL-SCNC: 23.2 MMOL/L (ref 22–29)
CREAT SERPL-MCNC: 1.23 MG/DL (ref 0.76–1.27)
EOSINOPHIL # BLD AUTO: 0.14 10*3/MM3 (ref 0–0.4)
EOSINOPHIL NFR BLD AUTO: 2.9 % (ref 0.3–6.2)
ERYTHROCYTE [DISTWIDTH] IN BLOOD BY AUTOMATED COUNT: 13.2 % (ref 12.3–15.4)
GLOBULIN SER CALC-MCNC: 2 GM/DL
GLUCOSE SERPL-MCNC: 155 MG/DL (ref 65–99)
HBA1C MFR BLD: 9.2 % (ref 4.8–5.6)
HCT VFR BLD AUTO: 48.5 % (ref 37.5–51)
HCV AB S/CO SERPL IA: <0.1 S/CO RATIO (ref 0–0.9)
HDLC SERPL-MCNC: 33 MG/DL (ref 40–60)
HGB BLD-MCNC: 16.6 G/DL (ref 13–17.7)
IMM GRANULOCYTES # BLD AUTO: 0.01 10*3/MM3 (ref 0–0.05)
IMM GRANULOCYTES NFR BLD AUTO: 0.2 % (ref 0–0.5)
LDLC SERPL CALC-MCNC: 86 MG/DL (ref 0–100)
LYMPHOCYTES # BLD AUTO: 0.77 10*3/MM3 (ref 0.7–3.1)
LYMPHOCYTES NFR BLD AUTO: 15.9 % (ref 19.6–45.3)
MCH RBC QN AUTO: 29.9 PG (ref 26.6–33)
MCHC RBC AUTO-ENTMCNC: 34.2 G/DL (ref 31.5–35.7)
MCV RBC AUTO: 87.2 FL (ref 79–97)
MONOCYTES # BLD AUTO: 0.39 10*3/MM3 (ref 0.1–0.9)
MONOCYTES NFR BLD AUTO: 8.1 % (ref 5–12)
NEUTROPHILS # BLD AUTO: 3.48 10*3/MM3 (ref 1.7–7)
NEUTROPHILS NFR BLD AUTO: 71.9 % (ref 42.7–76)
NRBC BLD AUTO-RTO: 0 /100 WBC (ref 0–0.2)
PLATELET # BLD AUTO: 247 10*3/MM3 (ref 140–450)
POTASSIUM SERPL-SCNC: 4.5 MMOL/L (ref 3.5–5.2)
PROT SERPL-MCNC: 6.7 G/DL (ref 6–8.5)
RBC # BLD AUTO: 5.56 10*6/MM3 (ref 4.14–5.8)
SODIUM SERPL-SCNC: 141 MMOL/L (ref 136–145)
TRIGL SERPL-MCNC: 168 MG/DL (ref 0–150)
TSH SERPL DL<=0.005 MIU/L-ACNC: 1.16 UIU/ML (ref 0.27–4.2)
VLDLC SERPL CALC-MCNC: 29 MG/DL (ref 5–40)
WBC # BLD AUTO: 4.84 10*3/MM3 (ref 3.4–10.8)

## 2021-07-08 DIAGNOSIS — E11.9 TYPE 2 DIABETES MELLITUS TREATED WITHOUT INSULIN (HCC): ICD-10-CM

## 2021-07-08 DIAGNOSIS — Z76.0 MEDICATION REFILL: ICD-10-CM

## 2021-07-13 DIAGNOSIS — E11.9 TYPE 2 DIABETES MELLITUS TREATED WITHOUT INSULIN (HCC): ICD-10-CM

## 2021-07-13 DIAGNOSIS — N18.30 STAGE 3 CHRONIC KIDNEY DISEASE (HCC): ICD-10-CM

## 2021-07-13 RX ORDER — SEMAGLUTIDE 1.34 MG/ML
INJECTION, SOLUTION SUBCUTANEOUS
Qty: 1.5 ML | Refills: 0 | Status: SHIPPED | OUTPATIENT
Start: 2021-07-13 | End: 2021-08-04

## 2021-07-23 ENCOUNTER — OFFICE VISIT (OUTPATIENT)
Dept: FAMILY MEDICINE CLINIC | Facility: CLINIC | Age: 61
End: 2021-07-23

## 2021-07-23 VITALS
DIASTOLIC BLOOD PRESSURE: 80 MMHG | BODY MASS INDEX: 33.93 KG/M2 | HEART RATE: 86 BPM | RESPIRATION RATE: 20 BRPM | WEIGHT: 256 LBS | HEIGHT: 73 IN | OXYGEN SATURATION: 98 % | SYSTOLIC BLOOD PRESSURE: 130 MMHG | TEMPERATURE: 97 F

## 2021-07-23 DIAGNOSIS — N18.31 TYPE 2 DIABETES MELLITUS WITH STAGE 3A CHRONIC KIDNEY DISEASE, WITHOUT LONG-TERM CURRENT USE OF INSULIN (HCC): Primary | Chronic | ICD-10-CM

## 2021-07-23 DIAGNOSIS — E11.22 TYPE 2 DIABETES MELLITUS WITH STAGE 3A CHRONIC KIDNEY DISEASE, WITHOUT LONG-TERM CURRENT USE OF INSULIN (HCC): Primary | Chronic | ICD-10-CM

## 2021-07-23 LAB
EXPIRATION DATE: NORMAL
HBA1C MFR BLD: 6.8 %
Lab: NORMAL

## 2021-07-23 PROCEDURE — 99213 OFFICE O/P EST LOW 20 MIN: CPT | Performed by: NURSE PRACTITIONER

## 2021-07-23 PROCEDURE — 3044F HG A1C LEVEL LT 7.0%: CPT | Performed by: NURSE PRACTITIONER

## 2021-07-23 PROCEDURE — 83036 HEMOGLOBIN GLYCOSYLATED A1C: CPT | Performed by: NURSE PRACTITIONER

## 2021-07-23 RX ORDER — FINASTERIDE 5 MG/1
5 TABLET, FILM COATED ORAL DAILY
COMMUNITY
Start: 2021-07-07

## 2021-07-23 NOTE — PATIENT INSTRUCTIONS
Diabetes Mellitus and Standards of Medical Care  Managing diabetes (diabetes mellitus) can be complicated. Your diabetes treatment may be managed by a team of health care providers, including:  · A physician who specializes in diabetes (endocrinologist).  · A nurse practitioner or physician assistant.  · Nurses.  · A registered dietitian.  · A certified diabetes care and .  · An exercise specialist.  · A pharmacist.  · An eye doctor.  · A foot specialist (podiatrist).  · A dentist.  · A primary care provider.  · A mental health provider.  Your health care providers follow guidelines to help you get the best quality of care. The following schedule is a general guideline for your diabetes management plan. Your health care providers may give you more specific instructions.  Physical exams  When you are diagnosed with diabetes, and each year after that, your health care provider will ask about your medical and family history. He or she will also do a physical exam, which may include:  · Measuring your height, weight, and BMI (body mass index).  · Checking your blood pressure. This will be done at every routine medical visit. Your target blood pressure may vary depending on your medical conditions, your age, and other factors.  · A thyroid exam.  · A skin exam.  · Screening for nerve damage (peripheral neuropathy). This may include checking the pulse in your legs and feet and checking the level of sensation in your hands and feet.  · A foot exam to inspect the structure and skin of your feet, including checking for cuts, bruises, redness, blisters, sores, or other problems.  · Screening for blood vessel (vascular) problems. This may include checking the pulse in your legs and feet and checking your temperature.  Blood tests  Depending on your treatment plan and your personal needs, you may have the following tests done:  · Hemoglobin A1c (HbA1c). This test provides information about blood sugar  (glucose) control over the previous 2-3 months. It is used to adjust your treatment plan, if needed. This test will be done:  ? At least 2 times a year, if you are meeting your treatment goals.  ? 4 times a year, if you are not meeting your treatment goals or if treatment goals have changed.  · Lipid testing, including total cholesterol, LDL and HDL cholesterol, and triglyceride levels.  ? The goal for LDL is less than 100 mg/dL (5.5 mmol/L). If you are at high risk for complications, the goal is less than 70 mg/dL (3.9 mmol/L).  ? The goal for HDL is 40 mg/dL (2.2 mmol/L) or higher for men, and 50 mg/dL (2.8 mmol/L) or higher for women. An HDL cholesterol of 60 mg/dL (3.3 mmol/L) or higher gives some protection against heart disease.  ? The goal for triglycerides is less than 150 mg/dL (8.3 mmol/L).  · Liver function tests.  · Kidney function tests.  · Thyroid function tests.  Dental and eye exams    · Visit your dentist two times a year.  · If you have type 1 diabetes, your health care provider may recommend an eye exam 3-5 years after you are diagnosed, and then once a year after your first exam.  ? For children with type 1 diabetes, a health care provider may recommend an eye exam when your child is age 10 or older and has had diabetes for 3-5 years. After the first exam, your child should get an eye exam once a year.  · If you have type 2 diabetes, your health care provider may recommend an eye exam as soon as you are diagnosed, and then once a year after your first exam.  Immunizations  · A yearly flu (influenza) vaccine is recommended annually for everyone 6 months or older. This is especially important if you have diabetes.  · The pneumonia (pneumococcal) vaccine is recommended for everyone 2 years or older who has diabetes. If you are 65 or older, you may get the pneumonia vaccine as a series of two separate shots.  · The hepatitis B vaccine is recommended for adults shortly after being diagnosed with  diabetes.  Adults and children with diabetes should receive all other vaccines according to age-specific recommendations from the Centers for Disease Control and Prevention (CDC).  Mental and emotional health  Screening for symptoms of eating disorders, anxiety, and depression is recommended at the time of diagnosis and after as needed. If your screening shows that you have symptoms (positive screening result), you may need more evaluation. You may work with a mental health care provider.  Treatment plan  Your treatment plan will be reviewed at every medical visit. You and your health care provider will discuss:  · How you are taking your medicines, including insulin.  · Any side effects you have.  · Your blood glucose target goals.  · How often you monitor your blood glucose.  · Lifestyle habits, such as activity level, as well as tobacco, alcohol, and substance use.  Diabetes self-management education  Your health care provider will assess how well you are monitoring your blood glucose levels and whether you are taking your insulin correctly. He or she may refer you to:  · A certified diabetes care and  to manage your diabetes throughout your life, starting at diagnosis.  · A registered dietitian who can create or review your personal nutrition plan.  · An exercise specialist who can discuss your activity level and exercise plan.  Summary  · Managing diabetes (diabetes mellitus) can be complicated. Your diabetes treatment may be managed by a team of health care providers.  · Your health care providers follow guidelines in order to help you get the best quality of care.  · You should have regular physical exams, blood tests, blood pressure monitoring, immunizations, screening tests, and education about how to manage your diabetes.  · Your health care providers may also give you more specific instructions based on your individual health.  This information is not intended to replace advice given  to you by your health care provider. Make sure you discuss any questions you have with your health care provider.  Document Revised: 12/22/2020 Document Reviewed: 12/22/2020  [employer] Patient Education © 2021 [employer]    Type 2 Diabetes Mellitus, Self-Care, Adult  Caring for yourself after you have been diagnosed with type 2 diabetes (type 2 diabetes mellitus) means keeping your blood sugar (glucose) under control with a balance of:  · Nutrition.  · Exercise.  · Lifestyle changes.  · Medicines or insulin, if needed.  · Support from your team of health care providers and others.  The following information explains what you need to know to manage your diabetes at home.  What are the risks?  Having diabetes can put you at risk for other long-term (chronic) conditions, such as heart disease and kidney disease. Your health care provider may prescribe medicines to help prevent complications from diabetes.  How to monitor blood glucose    · Check your blood glucose every day, as often as told by your health care provider.  · Have your A1C (hemoglobin A1C) level checked two or more times a year, or as often as told by your health care provider.  · Your health care provider will set personalized treatment goals for you. Generally, the goal of treatment is to maintain the following blood glucose levels:  ? Before meals:  mg/dL (4.4-7.2 mmol/L).  ? After meals: below 180 mg/dL (10 mmol/L).  ? A1C level: less than 7%.  How to manage hyperglycemia and hypoglycemia  Hyperglycemia symptoms  Hyperglycemia, also called high blood glucose, occurs when blood glucose is too high. Make sure you know the early signs of hyperglycemia, such as:  · Increased thirst.  · Hunger.  · Feeling very tired.  · Needing to urinate more often than usual.  · Blurry vision.  Hypoglycemia symptoms  Hypoglycemia, also called low blood glucose, occurs with a blood glucose level at or below 70 mg/dL (3.9 mmol/L). Diabetes medicines lower your  blood glucose and can cause hypoglycemia. The risk for hypoglycemia increases during or after exercise, during sleep, during illness, and when skipping meals or not eating for a long time (fasting).  It is important to know the symptoms of hypoglycemia and treat it right away. Always have a 15-gram rapid-acting carbohydrate snack with you to treat low blood glucose. Family members and close friends should also know the symptoms and understand how to treat hypoglycemia, in case you are not able to treat yourself. Symptoms may include:  · Hunger.  · Anxiety.  · Sweating and feeling clammy.  · Dizziness or feeling light-headed.  · Sleepiness.  · Increased heart rate.  · Irritability.  · Tingling or numbness around the mouth, lips, or tongue.  · Restless sleep.  Severe hypoglycemia is when your blood glucose level is at or below 54 mg/dL (3 mmol/L). Severe hypoglycemia is an emergency. Do not wait to see if the symptoms will go away. Get medical help right away. Call your local emergency services (911 in the [place] Do not drive yourself to the hospital.  If you have severe hypoglycemia and you cannot eat or drink, you may need glucagon. A family member or close friend should learn how to check your blood glucose and how to give you glucagon. Ask your health care provider if you need to have an emergency glucagon kit available.  Follow these instructions at home:  Medicines  · Take diabetes medicines as told by your health care provider. If your health care provider prescribed insulin or diabetes medicines, take them every day.  · Do not run out of insulin or other diabetes medicines. Plan ahead so you always have these available.  · If you use insulin, adjust your dosage based on your physical activity and what foods you eat. Your health care provider will tell you how to adjust your dosage.  · Take over-the-counter and prescription medicines only as told by your health care provider.  Eating and drinking    What you  eat and drink affects your blood glucose and your insulin dosage. Making good choices helps to control your diabetes and prevent other health problems. A healthy meal plan includes eating lean proteins, complex carbohydrates, fresh fruits and vegetables, low-fat dairy products, and healthy fats.  Make an appointment to see a registered dietitian to help you create an eating plan that is right for you. Make sure that you:  · Follow instructions from your health care provider about eating or drinking restrictions.  · Drink enough fluid to keep your urine pale yellow.  · Keep a record of the carbohydrates that you eat. Do this by reading food labels and learning the standard serving sizes of foods.  · Follow your sick-day plan whenever you cannot eat or drink as usual. Make this plan in advance with your health care provider.    Activity  · Stay active. Exercise regularly, as told by your health care provider. This may include:  ? Stretching and doing strength exercises, such as yoga or weight lifting, 2 or more times a week.  ? Doing 150 minutes or more of moderate-intensity or vigorous-intensity exercise each week. This could be brisk walking, biking, or water aerobics.  § Spread out your activity over 3 or more days of the week.  § Do not go more than 2 days in a row without doing some kind of physical activity.  · When you start a new exercise or activity, work with your health care provider to adjust your insulin, medicines, or food intake as needed.  Lifestyle  · Do not use any products that contain nicotine or tobacco, such as cigarettes, e-cigarettes, and chewing tobacco. If you need help quitting, ask your health care provider.  · If your health care provider says that alcohol is safe for you, limit how much you use to no more than 1 drink a day for women who are not pregnant and 2 drinks a day for men. In the [place] one drink equals one 12 oz bottle of beer (355 mL), one 5 oz glass of wine (148 mL), or one 1½  oz glass of hard liquor (44 mL).  · Learn to manage stress. If you need help with this, ask your health care provider.  Take care of your body    · Keep your immunizations up to date. In addition to getting vaccinations as told by your health care provider, it is recommended that you get vaccinated against the following illnesses:  ? The flu (influenza). Get a flu shot every year.  ? Pneumonia.  ? Hepatitis B.  · Schedule an eye exam soon after your diagnosis, and then one time every year after that.  · Check your skin and feet every day for cuts, bruises, redness, blisters, or sores. Schedule a foot exam with your health care provider once every year.  · Brush your teeth and gums two times a day, and floss one or more times a day. Visit your dentist one or more times every 6 months.  · Maintain a healthy weight.  General instructions  · Share your diabetes management plan with people in your workplace, school, and household.  · Carry a medical alert card or wear medical alert jewelry.  · Keep all follow-up visits as told by your health care provider. This is important.  Questions to ask your health care provider  · Should I meet with a certified diabetes care and ?  · Where can I find a support group for people with diabetes?  Where to find more information  · American Diabetes Association (ADA): www.diabetes.org  · American Association of Diabetes Care and Education Specialists (ADCES): www.diabeteseducator.org  · International Diabetes Federation (IDF): www.idf.org  Summary  · Caring for yourself after you have been diagnosed with type 2 diabetes (type 2 diabetes mellitus) means keeping your blood sugar (glucose) under control with a balance of nutrition, exercise, lifestyle changes, and medicine.  · Check your blood glucose every day, as often as told by your health care provider.  · Having diabetes can put you at risk for other long-term (chronic) conditions, such as heart disease and kidney  disease. Your health care provider may prescribe medicines to help prevent complications from diabetes.  · Share your diabetes management plan with people in your workplace, school, and household.  · Keep all follow-up visits as told by your health care provider. This is important.  This information is not intended to replace advice given to you by your health care provider. Make sure you discuss any questions you have with your health care provider.  Document Revised: 01/25/2021 Document Reviewed: 01/26/2021  [employer] Patient Education © 2021 [employer]    Chronic Kidney Disease, Adult  Chronic kidney disease (CKD) occurs when the kidneys become damaged slowly over a long period of time. The kidneys are a pair of organs that do many important jobs in the body, including:  · Removing waste and extra fluid from the blood to make urine.  · Making hormones that maintain the amount of fluid in tissues and blood vessels.  · Maintaining the right amount of fluids and chemicals in the body.  A small amount of kidney damage may not cause problems, but a large amount of damage may make it hard or impossible for the kidneys to work the way they should. If steps are not taken to slow down kidney damage or to stop it from getting worse, the kidneys may stop working permanently (end-stage renal disease or ESRD). Most of the time, CKD does not go away, but it can often be controlled. People who have CKD are usually able to live normal lives.  What are the causes?  The most common causes of this condition are diabetes and high blood pressure (hypertension). Other causes include:  · Heart and blood vessel (cardiovascular) disease.  · Kidney diseases, such as:  ? Glomerulonephritis.  ? Interstitial nephritis.  ? Polycystic kidney disease.  ? Renal vascular disease.  · Diseases that affect the immune system.  · Genetic diseases.  · Medicines that damage the kidneys, such as anti-inflammatory medicines.  · Being around or being in  contact with poisonous (toxic) substances.  · A kidney or urinary infection that occurs again and again (recurs).  · Vasculitis. This is swelling or inflammation of the blood vessels.  · A problem with urine flow that may be caused by:  ? Cancer.  ? Having kidney stones more than one time.  ? An enlarged prostate, in males.  What increases the risk?  You are more likely to develop this condition if you:  · Are older than age 60.  · Are female.  · Are -American, , , , or .  · Are a current or former smoker.  · Are obese.  · Have a family history of kidney disease or failure.  · Often take medicines that are damaging to the kidneys.  What are the signs or symptoms?  Symptoms of this condition include:  · Swelling (edema) of the face, legs, ankles, or feet.  · Tiredness (lethargy) and having less energy.  · Nausea or vomiting.  · Confusion or trouble concentrating.  · Problems with urination, such as:  ? Painful or burning feeling during urination.  ? Decreased urine production.  ? Frequent urination, especially at night.  ? Bloody urine.  · Muscle twitches and cramps, especially in the legs.  · Shortness of breath.  · Weakness.  · Loss of appetite.  · Metallic taste in the mouth.  · Trouble sleeping.  · Dry, itchy skin.  · A low blood count (anemia).  · Pale lining of the eyelids and surface of the eye (conjunctiva).  Symptoms develop slowly and may not be obvious until the kidney damage becomes severe. It is possible to have kidney disease for years without having any symptoms.  How is this diagnosed?  This condition may be diagnosed based on:  · Blood tests.  · Urine tests.  · Imaging tests, such as an ultrasound or CT scan.  · A test in which a sample of tissue is removed from the kidneys to be examined under a microscope (kidney biopsy).  These test results will help your health care provider determine how serious the CKD is.  How is this treated?  There is no  cure for most cases of this condition, but treatment usually relieves symptoms and prevents or slows the progression of the disease. Treatment may include:  · Making diet changes, which may require you to avoid alcohol, salty foods (sodium), and foods that are high in potassium, calcium, and protein.  · Medicines:  ? To lower blood pressure.  ? To control blood glucose.  ? To relieve anemia.  ? To relieve swelling.  ? To protect your bones.  ? To improve the balance of electrolytes in your blood.  · Removing toxic waste from the body through types of dialysis, if the kidneys can no longer do their job (kidney failure).  · Managing any other conditions that are causing your CKD or making it worse.  Follow these instructions at home:  Medicines  · Take over-the-counter and prescription medicines only as told by your health care provider. The dose of some medicines that you take may need to be adjusted.  · Do not take any new medicines unless approved by your health care provider. Many medicines can worsen your kidney damage.  · Do not take any vitamin and mineral supplements unless approved by your health care provider. Many nutritional supplements can worsen your kidney damage.  General instructions  · Follow your prescribed diet as told by your health care provider.  · Do not use any products that contain nicotine or tobacco, such as cigarettes and e-cigarettes. If you need help quitting, ask your health care provider.  · Monitor and track your blood pressure at home. Report changes in your blood pressure as told by your health care provider.  · If you are being treated for diabetes, monitor and track your blood sugar (blood glucose) levels as told by your health care provider.  · Maintain a healthy weight. If you need help with this, ask your health care provider.  · Start or continue an exercise plan. Exercise at least 30 minutes a day, 5 days a week.  · Keep your immunizations up to date as told by your health  care provider.  · Keep all follow-up visits as told by your health care provider. This is important.  Where to find more information  · American Association of Kidney Patients: www.aakp.org  · National Kidney Foundation: www.kidney.org  · American Kidney Fund: www.akfinc.org  · [facility]: www.lifeoptions.org and www.kidneyschool.org  Contact a health care provider if:  · Your symptoms get worse.  · You develop new symptoms.  Get help right away if:  · You develop symptoms of ESRD, which include:  ? Headaches.  ? Numbness in the hands or feet.  ? Easy bruising.  ? Frequent hiccups.  ? Chest pain.  ? Shortness of breath.  ? Lack of menstruation, in women.  · You have a fever.  · You have decreased urine production.  · You have pain or bleeding when you urinate.  Summary  · Chronic kidney disease (CKD) occurs when the kidneys become damaged slowly over a long period of time.  · The most common causes of this condition are diabetes and high blood pressure (hypertension).  · There is no cure for most cases of this condition, but treatment usually relieves symptoms and prevents or slows the progression of the disease. Treatment may include a combination of medicines and lifestyle changes.  This information is not intended to replace advice given to you by your health care provider. Make sure you discuss any questions you have with your health care provider.  Document Revised: 2018 Document Reviewed: 2018  [employer] Patient Education ©  [employer]  Diabetes Mellitus and Kidney Disease  In this video, you will learn about options for treating kidney disease. Treatments discussed include medicines, dialysis and kidney transplant.  To view the content, go to this web address:  [email or web address]    This video will  on: 2023. If you need access to this video following this date, please reach out to the healthcare provider who assigned it to you.  This  information is not intended to replace advice given to you by your health care provider. Make sure you discuss any questions you have with your health care provider.  [employer] Patient Education © 2021 [employer]

## 2021-07-23 NOTE — PROGRESS NOTES
Follow Up Office Note     Patient Name: Omar Can  : 1960   MRN: [id number]     Chief Complaint:    Chief Complaint   Patient presents with   • Diabetes       History of Present Illness: Omar Can is a 61 y.o. male who presents today for reevaluation and management of type 2 diabetes mellitus.  Patient's last A1c in February of this year was 9.2.  He is currently taking Amaryl 4 mg daily, Metformin 850 mg twice daily and Ozempic 0.25 mg once weekly.  Patient states he has been eating healthier and he has lost 18 pounds since his last visit in February.  He has been following with his cardiologist Dr. Damon-his last visit was in May of this year.  Dr. Damon is retired as of , patient will be following with another cardiologist at this office.  Patient is continuing with nephrology for management of his chronic kidney disease.  [facility]  Patient states that Dr. Mccullough discussed adjusting his blood pressure medication due to his blood pressure being above goal. Patient has been monitoring his blood pressure at home-he has a copy of his readings with him today (see scanned documents).  Patient is also continuing with Dr. Todd Ponce for management of BPH.  Patient states that Dr. Ponce discussed possible surgery, however patient wanted to try adding new medication instead.  Patient currently taking Flomax and finasteride.  Should patient continue to have difficulty emptying his bladder he plans to follow-up with Dr. Ponce for surgical intervention.    Diabetes  He presents for his follow-up diabetic visit. He has type 2 diabetes mellitus. The initial diagnosis of diabetes was made 17 years ago. His disease course has been improving. Pertinent negatives for hypoglycemia include no confusion, dizziness, headaches, hunger, mood changes, nervousness/anxiousness, pallor, seizures, sleepiness, speech difficulty, sweats or tremors.  Associated symptoms include foot paresthesias, polyuria and weight loss. Pertinent negatives for diabetes include no blurred vision, no chest pain, no fatigue, no foot ulcerations, no polydipsia, no polyphagia, no visual change and no weakness. Pertinent negatives for hypoglycemia complications include no blackouts, no hospitalization, no nocturnal hypoglycemia, no required assistance and no required glucagon injection. Pertinent negatives for diabetic complications include no CVA, impotence or retinopathy. Risk factors for coronary artery disease include hypertension. Current diabetic treatment includes diet and oral agent (triple therapy). He is compliant with treatment all of the time. His weight is stable. He is following a generally healthy diet. Meal planning includes avoidance of concentrated sweets. He has not had a previous visit with a dietitian. He participates in exercise intermittently. He monitors blood glucose at home 1-2 x per day. Blood glucose monitoring compliance is good. His breakfast blood glucose is taken between 7-8 am. His breakfast blood glucose range is generally 110-130 mg/dl. His highest blood glucose is 140-180 mg/dl. He does not see a podiatrist.Eye exam is current.        Subjective      Review of Systems:   Review of Systems   Constitutional: Positive for weight loss. Negative for activity change, appetite change, chills, diaphoresis, fatigue, fever and unexpected weight change.   Eyes: Negative for blurred vision.   Respiratory: Negative.    Cardiovascular: Negative.  Negative for chest pain.   Gastrointestinal: Negative.    Endocrine: Positive for polyuria. Negative for polydipsia and polyphagia.   Genitourinary: Positive for frequency. Negative for dysuria, flank pain, hematuria, impotence and urgency.        BPH   Skin: Negative for pallor.   Neurological: Negative.  Negative for dizziness, tremors, seizures, speech difficulty, weakness and headaches.   Psychiatric/Behavioral:  Negative for confusion. The patient is not nervous/anxious.         Past Medical History:   Past Medical History:   Diagnosis Date   • Benign prostatic hyperplasia    • Colon polyps    • Coronary stent patent     x 5 stents    • Hypertension    • Myocardial infarction (CMS/HCC)    • Obesity    • Prostate hypertrophy    • Sleep apnea    • Type 2 diabetes mellitus (CMS/HCC)          Medications:     Current Outpatient Medications:   •  amLODIPine-valsartan (EXFORGE)  MG per tablet, Take 1 tablet by mouth Daily., Disp: , Rfl:   •  aspirin 81 MG tablet, aspirin 81 mg tablet  Take 1 tablet every day by oral route., Disp: , Rfl:   •  atorvastatin (LIPITOR) 40 MG tablet, Take 1 tablet by mouth Every Night., Disp: 90 tablet, Rfl: 2  •  carvedilol (COREG) 6.25 MG tablet, Take 1 tablet by mouth 2 (Two) Times a Day., Disp: 180 tablet, Rfl: 2  •  finasteride (PROSCAR) 5 MG tablet, , Disp: , Rfl:   •  furosemide (LASIX) 20 MG tablet, TAKE 1 OR 2 TABLETS BY MOUTH ONE TIME A DAY AS NEEDED FOR SWELLING, Disp: , Rfl:   •  glimepiride (AMARYL) 4 MG tablet, Take 1 tablet by mouth 2 (Two) Times a Day., Disp: 180 tablet, Rfl: 2  •  glucose blood test strip, Use as instructed, Disp: 100 each, Rfl: 12  •  glucose monitor monitoring kit, 1 each As Needed (for diabetes)., Disp: 1 each, Rfl: 0  •  hydrALAZINE (APRESOLINE) 50 MG tablet, Every 12 (Twelve) Hours., Disp: , Rfl:   •  Lancets (ONETOUCH ULTRASOFT) lancets, Check daily and prn, Disp: 100 each, Rfl: 12  •  metFORMIN (GLUCOPHAGE) 850 MG tablet, TAKE 1/2 TABLET BY MOUTH TWO TIMES A DAY, Disp: 90 tablet, Rfl: 0  •  Multiple Vitamin (MULTI-VITAMIN DAILY PO), Multi Vitamin  qd, Disp: , Rfl:   •  nitroglycerin (Nitrostat) 0.3 MG SL tablet, Place 0.3 mg under the tongue Every 5 (Five) Minutes As Needed for Chest Pain. Take no more than 3 doses in 15 minutes., Disp: , Rfl:   •  ondansetron ODT (ZOFRAN-ODT) 4 MG disintegrating tablet, Place 1 tablet on the tongue Every 8 (Eight) Hours  "As Needed for Nausea or Vomiting., Disp: 20 tablet, Rfl: 0  •  oxybutynin XL (DITROPAN-XL) 10 MG 24 hr tablet, oxybutynin chloride ER 10 mg tablet,extended release 24 hr  TAKE 1 TABLET BY MOUTH ONE TIME A DAY, Disp: , Rfl:   •  Ozempic, 0.25 or 0.5 MG/DOSE, 2 MG/1.5ML solution pen-injector, inject 0.25mg under the skin into the appropriate area as directed once weekly , Disp: 1.5 mL, Rfl: 0  •  Potassium Gluconate 2 MEQ tablet, potassium gluconate, Disp: , Rfl:   •  tamsulosin (FLOMAX) 0.4 MG capsule 24 hr capsule, Take 1 capsule by mouth Daily., Disp: 90 capsule, Rfl: 2    Allergies:   Allergies   Allergen Reactions   • Cyclobenzaprine Hives   • Flucloxacillin Hives         Objective     Physical Exam:  Vital Signs:   Vitals:    07/23/21 0913   BP: 130/80   Pulse: 86   Resp: 20   Temp: 97 °F (36.1 °C)   SpO2: 98%   Weight: 116 kg (256 lb)   Height: 185.4 cm (73\")   PainSc: 0-No pain     Body mass index is 33.78 kg/m².     Physical Exam  Vitals and nursing note reviewed.   Constitutional:       General: He is not in acute distress.     Appearance: Normal appearance. He is well-developed. He is not ill-appearing, toxic-appearing or diaphoretic.   HENT:      Head: Normocephalic and atraumatic.   Cardiovascular:      Rate and Rhythm: Normal rate.   Pulmonary:      Effort: Pulmonary effort is normal. No respiratory distress.      Breath sounds: No stridor.   Skin:     General: Skin is warm and dry.   Neurological:      General: No focal deficit present.      Mental Status: He is alert and oriented to person, place, and time.   Psychiatric:         Mood and Affect: Mood normal.         Behavior: Behavior normal. Behavior is cooperative.         Thought Content: Thought content normal.         Judgment: Judgment normal.         Assessment / Plan      Assessment/Plan:   Diagnoses and all orders for this visit:    1. Type 2 diabetes mellitus with stage 3a chronic kidney disease, without long-term current use of insulin " (CMS/[facility]) (Primary)  Assessment & Plan:  Diabetes is improving with treatment. Patient's A1C improved from 9.2 in April of this year to 6.8 today.  Continue current treatment regimen. Continue current diabetic medications.  Diabetes will be reassessed in 3 months.    Orders:  -     POC Glycosylated Hemoglobin (Hb A1C)         Lab 07/23/21  0927   HEMOGLOBIN A1C 6.8       Follow Up:   PRN and at next scheduled appointment(s) with PCP.    Discussed the nature of the medical condition(s) risks, complications, implications, management, safe and proper use of medications. Encouraged medication compliance, and keeping scheduled follow up appointments with me and any other providers.      RTC if symptoms fail to improve, to ER if symptoms worsen.      BRIDGET Carter  [facility] [place]       Please note that portions of this note may have been completed with a voice recognition program. Efforts were made to edit the dictations, but occasionally words are mistranscribed.

## 2021-07-23 NOTE — ASSESSMENT & PLAN NOTE
Diabetes is improving with treatment. Patient's A1C improved from 9.2 in April of this year to 6.8 today.  Continue current treatment regimen. Continue current diabetic medications.  Diabetes will be reassessed in 3 months.

## 2021-07-28 DIAGNOSIS — Z76.0 MEDICATION REFILL: ICD-10-CM

## 2021-07-28 DIAGNOSIS — I10 ESSENTIAL HYPERTENSION: ICD-10-CM

## 2021-07-28 RX ORDER — CARVEDILOL 6.25 MG/1
TABLET ORAL
Qty: 180 TABLET | Refills: 0 | Status: SHIPPED | OUTPATIENT
Start: 2021-07-28 | End: 2021-10-31

## 2021-08-04 DIAGNOSIS — N18.30 STAGE 3 CHRONIC KIDNEY DISEASE (HCC): ICD-10-CM

## 2021-08-04 DIAGNOSIS — E11.9 TYPE 2 DIABETES MELLITUS TREATED WITHOUT INSULIN (HCC): ICD-10-CM

## 2021-08-04 RX ORDER — SEMAGLUTIDE 1.34 MG/ML
INJECTION, SOLUTION SUBCUTANEOUS
Qty: 1.5 ML | Refills: 0 | Status: SHIPPED | OUTPATIENT
Start: 2021-08-04 | End: 2021-10-19

## 2021-08-16 DIAGNOSIS — E11.9 TYPE 2 DIABETES MELLITUS TREATED WITHOUT INSULIN (HCC): ICD-10-CM

## 2021-08-16 DIAGNOSIS — E78.5 HYPERLIPIDEMIA, UNSPECIFIED HYPERLIPIDEMIA TYPE: ICD-10-CM

## 2021-08-16 DIAGNOSIS — Z76.0 MEDICATION REFILL: ICD-10-CM

## 2021-08-16 RX ORDER — GLIMEPIRIDE 4 MG/1
4 TABLET ORAL 2 TIMES DAILY
Qty: 180 TABLET | Refills: 2 | Status: SHIPPED | OUTPATIENT
Start: 2021-08-16 | End: 2022-05-03

## 2021-08-16 RX ORDER — ATORVASTATIN CALCIUM 40 MG/1
40 TABLET, FILM COATED ORAL NIGHTLY
Qty: 90 TABLET | Refills: 2 | Status: SHIPPED | OUTPATIENT
Start: 2021-08-16 | End: 2022-05-03

## 2021-10-02 DIAGNOSIS — Z76.0 MEDICATION REFILL: ICD-10-CM

## 2021-10-02 DIAGNOSIS — E11.9 TYPE 2 DIABETES MELLITUS TREATED WITHOUT INSULIN (HCC): ICD-10-CM

## 2021-10-04 RX ORDER — BLOOD-GLUCOSE METER
KIT MISCELLANEOUS
Qty: 100 EACH | Refills: 11 | Status: SHIPPED | OUTPATIENT
Start: 2021-10-04 | End: 2022-06-24 | Stop reason: SDUPTHER

## 2021-10-08 DIAGNOSIS — E11.9 TYPE 2 DIABETES MELLITUS TREATED WITHOUT INSULIN (HCC): ICD-10-CM

## 2021-10-08 DIAGNOSIS — Z76.0 MEDICATION REFILL: ICD-10-CM

## 2021-10-19 DIAGNOSIS — E11.9 TYPE 2 DIABETES MELLITUS TREATED WITHOUT INSULIN (HCC): ICD-10-CM

## 2021-10-19 DIAGNOSIS — N18.30 STAGE 3 CHRONIC KIDNEY DISEASE (HCC): ICD-10-CM

## 2021-10-19 RX ORDER — SEMAGLUTIDE 1.34 MG/ML
INJECTION, SOLUTION SUBCUTANEOUS
Qty: 1.5 ML | Refills: 0 | Status: SHIPPED | OUTPATIENT
Start: 2021-10-19 | End: 2021-11-03

## 2021-10-30 DIAGNOSIS — Z76.0 MEDICATION REFILL: ICD-10-CM

## 2021-10-30 DIAGNOSIS — I10 ESSENTIAL HYPERTENSION: ICD-10-CM

## 2021-10-31 RX ORDER — CARVEDILOL 6.25 MG/1
TABLET ORAL
Qty: 180 TABLET | Refills: 0 | Status: SHIPPED | OUTPATIENT
Start: 2021-10-31 | End: 2022-01-31 | Stop reason: SDUPTHER

## 2021-11-03 DIAGNOSIS — E11.9 TYPE 2 DIABETES MELLITUS TREATED WITHOUT INSULIN (HCC): ICD-10-CM

## 2021-11-03 DIAGNOSIS — N18.30 STAGE 3 CHRONIC KIDNEY DISEASE (HCC): ICD-10-CM

## 2021-11-03 RX ORDER — SEMAGLUTIDE 1.34 MG/ML
INJECTION, SOLUTION SUBCUTANEOUS
Qty: 1.5 ML | Refills: 5 | Status: SHIPPED | OUTPATIENT
Start: 2021-11-03 | End: 2022-10-04

## 2022-01-05 DIAGNOSIS — Z76.0 MEDICATION REFILL: ICD-10-CM

## 2022-01-05 DIAGNOSIS — E11.9 TYPE 2 DIABETES MELLITUS TREATED WITHOUT INSULIN: ICD-10-CM

## 2022-01-31 DIAGNOSIS — I10 ESSENTIAL HYPERTENSION: ICD-10-CM

## 2022-01-31 DIAGNOSIS — Z76.0 MEDICATION REFILL: ICD-10-CM

## 2022-02-01 DIAGNOSIS — Z76.0 MEDICATION REFILL: ICD-10-CM

## 2022-02-01 DIAGNOSIS — E11.9 TYPE 2 DIABETES MELLITUS TREATED WITHOUT INSULIN: ICD-10-CM

## 2022-02-01 RX ORDER — CARVEDILOL 6.25 MG/1
6.25 TABLET ORAL 2 TIMES DAILY
Qty: 180 TABLET | Refills: 0 | Status: SHIPPED | OUTPATIENT
Start: 2022-02-01 | End: 2022-02-24

## 2022-02-21 ENCOUNTER — PRIOR AUTHORIZATION (OUTPATIENT)
Dept: FAMILY MEDICINE CLINIC | Facility: CLINIC | Age: 62
End: 2022-02-21

## 2022-02-24 ENCOUNTER — OFFICE VISIT (OUTPATIENT)
Dept: FAMILY MEDICINE CLINIC | Facility: CLINIC | Age: 62
End: 2022-02-24

## 2022-02-24 VITALS
OXYGEN SATURATION: 98 % | TEMPERATURE: 98.6 F | WEIGHT: 257 LBS | DIASTOLIC BLOOD PRESSURE: 80 MMHG | SYSTOLIC BLOOD PRESSURE: 132 MMHG | HEIGHT: 73 IN | RESPIRATION RATE: 18 BRPM | BODY MASS INDEX: 34.06 KG/M2 | HEART RATE: 73 BPM

## 2022-02-24 DIAGNOSIS — E11.9 TYPE 2 DIABETES MELLITUS WITHOUT COMPLICATION, WITHOUT LONG-TERM CURRENT USE OF INSULIN: Primary | ICD-10-CM

## 2022-02-24 DIAGNOSIS — Z23 NEED FOR TDAP VACCINATION: ICD-10-CM

## 2022-02-24 DIAGNOSIS — G89.29 CHRONIC PAIN OF LEFT KNEE: Chronic | ICD-10-CM

## 2022-02-24 DIAGNOSIS — M25.562 CHRONIC PAIN OF LEFT KNEE: Chronic | ICD-10-CM

## 2022-02-24 DIAGNOSIS — N40.0 PROSTATE HYPERTROPHY: Chronic | ICD-10-CM

## 2022-02-24 LAB
EXPIRATION DATE: NORMAL
HBA1C MFR BLD: 7.5 %
Lab: NORMAL

## 2022-02-24 PROCEDURE — 3051F HG A1C>EQUAL 7.0%<8.0%: CPT | Performed by: NURSE PRACTITIONER

## 2022-02-24 PROCEDURE — 90715 TDAP VACCINE 7 YRS/> IM: CPT | Performed by: NURSE PRACTITIONER

## 2022-02-24 PROCEDURE — 99214 OFFICE O/P EST MOD 30 MIN: CPT | Performed by: NURSE PRACTITIONER

## 2022-02-24 PROCEDURE — 90471 IMMUNIZATION ADMIN: CPT | Performed by: NURSE PRACTITIONER

## 2022-02-24 PROCEDURE — 83036 HEMOGLOBIN GLYCOSYLATED A1C: CPT | Performed by: NURSE PRACTITIONER

## 2022-02-24 RX ORDER — VALSARTAN 320 MG/1
320 TABLET ORAL DAILY
COMMUNITY
Start: 2022-02-01

## 2022-02-24 RX ORDER — HYDRALAZINE HYDROCHLORIDE 50 MG/1
50 TABLET, FILM COATED ORAL EVERY 8 HOURS SCHEDULED
COMMUNITY

## 2022-02-24 RX ORDER — AMLODIPINE BESYLATE 10 MG/1
10 TABLET ORAL DAILY
COMMUNITY
Start: 2022-02-01

## 2022-02-24 RX ORDER — CARVEDILOL 12.5 MG/1
12.5 TABLET ORAL 2 TIMES DAILY WITH MEALS
COMMUNITY
Start: 2022-02-21 | End: 2022-07-12

## 2022-02-24 NOTE — ASSESSMENT & PLAN NOTE
Prostate symptoms worsening.  Continue finasteride and oxybutynin.  Continue with current treatment plan with urologist.

## 2022-02-24 NOTE — PROGRESS NOTES
Follow Up Office Note     Patient Name: Omar Can  : 1960   MRN: [id number]     Chief Complaint:    Chief Complaint   Patient presents with   • Diabetes   • Benign Prostatic Hypertrophy   • Knee Pain       History of Present Illness: Omar Can is a 61 y.o. male who presents today for reevaluation and management of type 2 diabetes mellitus.  Patient's diabetes has been stable and controlled on current medications.  Patient's last A1c was 6.8 (2021).  Patient reports compliance with his medications and states he has not been having any side effects or adverse reactions.  Patient states he had his diabetic eye exam in January of this year with normal findings.  He states his glucose this morning was 108 (fasting).    Patient has chronic BPH.  He states his symptoms have been worsening recently.  He states he has been having trouble urinating, weak stream, nocturia.  He has been following with Dr. Ponce for management.  He states he is scheduled for a prostate reduction procedure next week.      Patient also has chronic left knee pain secondary to osteoarthritis.  He has been following with pain management at [facility].  He states he had a nerve ablation recently which has significantly reduced his knee pain.    Subjective      Review of Systems:   Review of Systems   Respiratory: Negative for chest tightness and shortness of breath.    Cardiovascular: Negative for chest pain, palpitations and leg swelling.   Endocrine: Negative for polydipsia, polyphagia and polyuria.   Genitourinary: Positive for difficulty urinating and frequency. Negative for dysuria, flank pain and hematuria.   Musculoskeletal: Positive for arthralgias.   Neurological: Negative.         Past Medical History:   Past Medical History:   Diagnosis Date   • Benign prostatic hyperplasia    • Colon polyps    • Coronary stent patent     x 5 stents    • Hypertension    • Myocardial infarction (HCC)    • Obesity    •  Prostate hypertrophy    • Sleep apnea          Medications:     Current Outpatient Medications:   •  amLODIPine (NORVASC) 10 MG tablet, Take 10 mg by mouth Daily., Disp: , Rfl:   •  aspirin 81 MG tablet, aspirin 81 mg tablet  Take 1 tablet every day by oral route., Disp: , Rfl:   •  atorvastatin (LIPITOR) 40 MG tablet, Take 1 tablet by mouth Every Night., Disp: 90 tablet, Rfl: 2  •  carvedilol (COREG) 12.5 MG tablet, Take 12.5 mg by mouth 2 (Two) Times a Day With Meals., Disp: , Rfl:   •  finasteride (PROSCAR) 5 MG tablet, , Disp: , Rfl:   •  furosemide (LASIX) 20 MG tablet, TAKE 1 OR 2 TABLETS BY MOUTH ONE TIME A DAY AS NEEDED FOR SWELLING, Disp: , Rfl:   •  glimepiride (AMARYL) 4 MG tablet, Take 1 tablet by mouth 2 (Two) Times a Day., Disp: 180 tablet, Rfl: 2  •  glucose blood (FREESTYLE LITE) test strip, USE TO CHECK BLOOD GLUCOSE UP TO THREE TIMES DAILY OR AS DIRECTED, Disp: 100 each, Rfl: 11  •  glucose monitor monitoring kit, 1 each As Needed (for diabetes)., Disp: 1 each, Rfl: 0  •  hydrALAZINE (APRESOLINE) 50 MG tablet, Every 8 (Eight) Hours., Disp: , Rfl:   •  Lancets (ONETOUCH ULTRASOFT) lancets, Check daily and prn, Disp: 100 each, Rfl: 12  •  metFORMIN (GLUCOPHAGE) 850 MG tablet, TAKE 1/2 TABLET BY MOUTH TWO TIMES A DAY, Disp: 90 tablet, Rfl: 0  •  Multiple Vitamin (MULTI-VITAMIN DAILY PO), Multi Vitamin  qd, Disp: , Rfl:   •  nitroglycerin (Nitrostat) 0.3 MG SL tablet, Place 0.3 mg under the tongue Every 5 (Five) Minutes As Needed for Chest Pain. Take no more than 3 doses in 15 minutes., Disp: , Rfl:   •  ondansetron ODT (ZOFRAN-ODT) 4 MG disintegrating tablet, Place 1 tablet on the tongue Every 8 (Eight) Hours As Needed for Nausea or Vomiting., Disp: 20 tablet, Rfl: 0  •  oxybutynin XL (DITROPAN-XL) 10 MG 24 hr tablet, oxybutynin chloride ER 10 mg tablet,extended release 24 hr  TAKE 1 TABLET BY MOUTH ONE TIME A DAY, Disp: , Rfl:   •  Ozempic, 0.25 or 0.5 MG/DOSE, 2 MG/1.5ML solution pen-injector,  "INJECT 0.25MG UNDER THE SKIN INTO THE APPROPRIATE AREA ONCE WEEKLY AS DIRECTED, Disp: 1.5 mL, Rfl: 5  •  Potassium Gluconate 2 MEQ tablet, potassium gluconate, Disp: , Rfl:   •  tamsulosin (FLOMAX) 0.4 MG capsule 24 hr capsule, Take 1 capsule by mouth Daily., Disp: 90 capsule, Rfl: 2  •  valsartan (DIOVAN) 320 MG tablet, Take 320 mg by mouth every night at bedtime., Disp: , Rfl:     Allergies:   Allergies   Allergen Reactions   • Cyclobenzaprine Hives   • Flucloxacillin Hives         Objective     Physical Exam:  Vital Signs:   Vitals:    02/24/22 0802   BP: 132/80   Pulse: 73   Resp: 18   Temp: 98.6 °F (37 °C)   SpO2: 98%   Weight: 117 kg (257 lb)   Height: 185.4 cm (73\")     Body mass index is 33.91 kg/m².     Physical Exam  Vitals and nursing note reviewed.   Constitutional:       General: He is not in acute distress.     Appearance: Normal appearance. He is well-developed. He is not ill-appearing, toxic-appearing or diaphoretic.   HENT:      Head: Normocephalic and atraumatic.   Cardiovascular:      Rate and Rhythm: Normal rate and regular rhythm.   Pulmonary:      Effort: Pulmonary effort is normal. No respiratory distress.      Breath sounds: Normal breath sounds. No stridor. No wheezing.   Musculoskeletal:      Right knee: Normal.      Left knee: No swelling, deformity or effusion. Decreased range of motion. Tenderness present.   Skin:     General: Skin is warm and dry.   Neurological:      General: No focal deficit present.      Mental Status: He is alert and oriented to person, place, and time.   Psychiatric:         Mood and Affect: Mood normal.         Behavior: Behavior normal. Behavior is cooperative.         Thought Content: Thought content normal.         Judgment: Judgment normal.         Assessment / Plan      Assessment/Plan:   Diagnoses and all orders for this visit:    1. Type 2 diabetes mellitus without complication, without long-term current use of insulin ([facility]) (Primary)  Assessment & " Plan:  Diabetes is worsening.  A1c today is 7.5  Continue current treatment regimen.  Dietary recommendations for ADA diet.  Regular aerobic exercise.  Diabetes will be reassessed in 3 months.  Weight loss.    Orders:  -     POC Glycosylated Hemoglobin (Hb A1C)    2. Prostate hypertrophy  Assessment & Plan:  Prostate symptoms worsening.  Continue finasteride and oxybutynin.  Continue with current treatment plan with urologist.      3. Chronic pain of left knee  Assessment & Plan:  Knee pain improving since nerve ablation.  Continue with pain management as needed.      4. Need for Tdap vaccination  -     Tdap Vaccine Greater Than or Equal To 8yo IM     Follow Up:   PRN and at next scheduled appointment(s) with PCP. Needs appointment for annual physical exam with fasting labs.    Discussed the nature of the medical condition(s) risks, complications, implications, management, safe and proper use of medications. Encouraged medication compliance, and keeping scheduled follow up appointments with me and any other providers.      RTC if symptoms fail to improve, to ER if symptoms worsen.      BRIDGET Carter  [facility] [place] [facility] [place]

## 2022-02-24 NOTE — PATIENT INSTRUCTIONS
Diabetes Mellitus and Nutrition, Adult  When you have diabetes, or diabetes mellitus, it is very important to have healthy eating habits because your blood sugar (glucose) levels are greatly affected by what you eat and drink. Eating healthy foods in the right amounts, at about the same times every day, can help you:  · Control your blood glucose.  · Lower your risk of heart disease.  · Improve your blood pressure.  · Reach or maintain a healthy weight.  What can affect my meal plan?  Every person with diabetes is different, and each person has different needs for a meal plan. Your health care provider may recommend that you work with a dietitian to make a meal plan that is best for you. Your meal plan may vary depending on factors such as:  · The calories you need.  · The medicines you take.  · Your weight.  · Your blood glucose, blood pressure, and cholesterol levels.  · Your activity level.  · Other health conditions you have, such as heart or kidney disease.  How do carbohydrates affect me?  Carbohydrates, also called carbs, affect your blood glucose level more than any other type of food. Eating carbs naturally raises the amount of glucose in your blood. Carb counting is a method for keeping track of how many carbs you eat. Counting carbs is important to keep your blood glucose at a healthy level, especially if you use insulin or take certain oral diabetes medicines.  It is important to know how many carbs you can safely have in each meal. This is different for every person. Your dietitian can help you calculate how many carbs you should have at each meal and for each snack.  How does alcohol affect me?  Alcohol can cause a sudden decrease in blood glucose (hypoglycemia), especially if you use insulin or take certain oral diabetes medicines. Hypoglycemia can be a life-threatening condition. Symptoms of hypoglycemia, such as sleepiness, dizziness, and confusion, are similar to symptoms of having too much  "alcohol.  · Do not drink alcohol if:  ? Your health care provider tells you not to drink.  ? You are pregnant, may be pregnant, or are planning to become pregnant.  · If you drink alcohol:  ? Do not drink on an empty stomach.  ? Limit how much you use to:  § 0-1 drink a day for women.  § 0-2 drinks a day for men.  ? Be aware of how much alcohol is in your drink. In the [place] one drink equals one 12 oz bottle of beer (355 mL), one 5 oz glass of wine (148 mL), or one 1½ oz glass of hard liquor (44 mL).  ? Keep yourself hydrated with water, diet soda, or unsweetened iced tea.  § Keep in mind that regular soda, juice, and other mixers may contain a lot of sugar and must be counted as carbs.  What are tips for following this plan?    Reading food labels  · Start by checking the serving size on the \"Nutrition Facts\" label of packaged foods and drinks. The amount of calories, carbs, fats, and other nutrients listed on the label is based on one serving of the item. Many items contain more than one serving per package.  · Check the total grams (g) of carbs in one serving. You can calculate the number of servings of carbs in one serving by dividing the total carbs by 15. For example, if a food has 30 g of total carbs per serving, it would be equal to 2 servings of carbs.  · Check the number of grams (g) of saturated fats and trans fats in one serving. Choose foods that have a low amount or none of these fats.  · Check the number of milligrams (mg) of salt (sodium) in one serving. Most people should limit total sodium intake to less than 2,300 mg per day.  · Always check the nutrition information of foods labeled as \"low-fat\" or \"nonfat.\" These foods may be higher in added sugar or refined carbs and should be avoided.  · Talk to your dietitian to identify your daily goals for nutrients listed on the label.  Shopping  · Avoid buying canned, pre-made, or processed foods. These foods tend to be high in fat, sodium, and added " sugar.  · Shop around the outside edge of the grocery store. This is where you will most often find fresh fruits and vegetables, bulk grains, fresh meats, and fresh dairy.  Cooking  · Use low-heat cooking methods, such as baking, instead of high-heat cooking methods like deep frying.  · Cook using healthy oils, such as olive, canola, or sunflower oil.  · Avoid cooking with butter, cream, or high-fat meats.  Meal planning  · Eat meals and snacks regularly, preferably at the same times every day. Avoid going long periods of time without eating.  · Eat foods that are high in fiber, such as fresh fruits, vegetables, beans, and whole grains. Talk with your dietitian about how many servings of carbs you can eat at each meal.  · Eat 4-6 oz (112-168 g) of lean protein each day, such as lean meat, chicken, fish, eggs, or tofu. One ounce (oz) of lean protein is equal to:  ? 1 oz (28 g) of meat, chicken, or fish.  ? 1 egg.  ? ¼ cup (62 g) of tofu.  · Eat some foods each day that contain healthy fats, such as avocado, nuts, seeds, and fish.  What foods should I eat?  Fruits  Berries. Apples. Oranges. Peaches. Apricots. Plums. Grapes. Tadeo. Papaya. Pomegranate. Kiwi. Cherries.  Vegetables  Lettuce. Spinach. Leafy greens, including kale, chard, jason greens, and mustard greens. Beets. Cauliflower. Cabbage. Broccoli. Carrots. Green beans. Tomatoes. Peppers. Onions. Cucumbers. [place] sprouts.  Grains  Whole grains, such as whole-wheat or whole-grain bread, crackers, tortillas, cereal, and pasta. Unsweetened oatmeal. Quinoa. Brown or wild rice.  Meats and other proteins  Seafood. Poultry without skin. Lean cuts of poultry and beef. Tofu. Nuts. Seeds.  Dairy  Low-fat or fat-free dairy products such as milk, yogurt, and cheese.  The items listed above may not be a complete list of foods and beverages you can eat. Contact a dietitian for more information.  What foods should I avoid?  Fruits  Fruits canned with  syrup.  Vegetables  Canned vegetables. Frozen vegetables with butter or cream sauce.  Grains  Refined white flour and flour products such as bread, pasta, snack foods, and cereals. Avoid all processed foods.  Meats and other proteins  Fatty cuts of meat. Poultry with skin. Breaded or fried meats. Processed meat. Avoid saturated fats.  Dairy  Full-fat yogurt, cheese, or milk.  Beverages  Sweetened drinks, such as soda or iced tea.  The items listed above may not be a complete list of foods and beverages you should avoid. Contact a dietitian for more information.  Questions to ask a health care provider  · Do I need to meet with a diabetes educator?  · Do I need to meet with a dietitian?  · What number can I call if I have questions?  · When are the best times to check my blood glucose?  Where to find more information:  · American Diabetes Association: diabetes.org  · Academy of Nutrition and Dietetics: www.eatright.org  · National [place] of Diabetes and Digestive and Kidney Diseases: www.niddk.nih.gov  · Association of Diabetes Care and Education Specialists: www.diabeteseducator.org  Summary  · It is important to have healthy eating habits because your blood sugar (glucose) levels are greatly affected by what you eat and drink.  · A healthy meal plan will help you control your blood glucose and maintain a healthy lifestyle.  · Your health care provider may recommend that you work with a dietitian to make a meal plan that is best for you.  · Keep in mind that carbohydrates (carbs) and alcohol have immediate effects on your blood glucose levels. It is important to count carbs and to use alcohol carefully.  This information is not intended to replace advice given to you by your health care provider. Make sure you discuss any questions you have with your health care provider.  Document Revised: 11/24/2020 Document Reviewed: 11/24/2020  [employer] Patient Education © 2021 [employer]    Immunization Schedule, 50-64  Years Old    Vaccines are usually given at various ages, according to a schedule. Your health care provider will recommend vaccines for you based on your age, medical history, and lifestyle or other factors such as travel or where you work.  You may receive vaccines as individual doses or as more than one vaccine together in one shot (combination vaccines). Talk with your health care provider about the risks and benefits of combination vaccines.  Recommended immunizations for 50-64 years old  Influenza vaccine  · You should get a dose of the influenza vaccine every year.  Tetanus, diphtheria, and pertussis vaccine  A vaccine that protects against tetanus, diphtheria, and pertussis is known as the Tdap vaccine. A vaccine that protects against tetanus and diphtheria is known as the Td vaccine.  · You should only get the Td vaccine if you have had at least 1 dose of the Tdap vaccine.  · You should get 1 dose of the Td or Tdap vaccine every 10 years, or you should get 1 dose of the Tdap vaccine if:  ? You have not previously gotten a Tdap vaccine.  ? You do not know if you have ever gotten a Tdap vaccine.  Zoster vaccine  This is also known as the RZV vaccine. You should get 2 doses of the RZV vaccine 2 to 6 months apart. It is important to get the RZV vaccine even if you:  · Have had shingles.  · Have received the ZVL vaccine, an older version of the RZV vaccine.  · Are unsure if you have had chickenpox (varicella).  Pneumococcal conjugate vaccine  This is also known as the PCV13 vaccine. You should get the PCV13 vaccine as recommended if you have certain high-risk conditions. These include:  · Diabetes.  · Chronic conditions of the heart, lungs, or liver.  · Conditions that affect the body's disease-fighting system (immune system).  Pneumococcal polysaccharide vaccine  This is also known as the PPSV23 vaccine. You should get the PPSV23 vaccine as recommended if you have certain high-risk conditions. These  include:  · Diabetes.  · Chronic conditions of the heart, lungs, or liver.  · Conditions that affect the immune system.  Hepatitis A vaccine  This is also known as the HepA vaccine. If you did not get the HepA vaccine previously, you should get it if:  · You are at risk for a hepatitis A infection. You may be at risk for infection if you:  ? Have chronic liver disease.  ? Have HIV or AIDS.  ? Are a man who has sex with men.  ? Use drugs.  ? Are homeless.  ? May be exposed to hepatitis A through work.  ? Travel to countries where hepatitis A is common.  ? Have or will have close contact with someone who was adopted from another country.  · You are not at risk for infection but want protection from hepatitis A.  Hepatitis B vaccine  This is also known as the HepB vaccine. If you did not get the HepB vaccine previously, you should get it if:  · You are at risk for hepatitis B infection. You are at risk if you:  ? Have chronic liver disease.  ? Have HIV or AIDS.  ? Have sex with a partner who has hepatitis B, or:  § You have multiple sex partners.  § You are a man who has sex with men.  ? Use drugs.  ? May be exposed to hepatitis B through work.  ? Live with someone who has hepatitis B.  ? Receive dialysis treatment.  ? Have diabetes.  ? Travel to countries where hepatitis B is common.  · You are not at risk of infection but want protection from hepatitis B.  Measles, mumps, and rubella vaccine  This is also known as the MMR vaccine. You may need to get the MMR vaccine if you were born in 1957 or later and:  · You need to catch up on doses you missed in the past.  · You have not been given the vaccine before.  · You do not have evidence of immunity (by a blood test).  Varicella vaccine  This is also known as the LYLE vaccine. You may need to get the LYLE vaccine if you do not have evidence of immunity (by a blood test) and you may be exposed to varicella through work. This is especially important if you work in a health  care setting.  Meningococcal conjugate vaccine  This is also known as the MenACWY vaccine. You may need to get the MenACWY vaccine if you:  · Have not been given the vaccine before.  · Need to catch up on doses you missed in the past.  This vaccine is especially important if you:  · Do not have a spleen.  · Have sickle cell disease.  · Have HIV.  · Take medicines that suppress your immune system.  · Travel to countries where meningococcal disease is common.  · Are exposed to Neisseria meningitidis at work.  Serogroup B meningococcal vaccine  This is also known as the MenB vaccine. You may need to get the MenB vaccine if you:  · Have not been given the vaccine before.  · Need to catch up on doses you missed in the past.  This vaccine is especially important if you:  · Do not have a spleen.  · Have sickle cell disease.  · Take medicines that suppress your immune system.  · Are exposed to Neisseria meningitidis at work.  Haemophilus influenzae type b vaccine  This is also known as the Hib vaccine. Anyone older than 5 years of age is usually not given the Hib vaccine. However, if you have certain high-risk conditions, you may need to get this vaccine. These conditions include:  · Not having a spleen.  · Having received a stem cell transplant.  Before you get a vaccine:  Talk with your health care provider about which vaccines are right for you. This is especially important if:  · You previously had a reaction after getting a vaccine.  · You have a weakened immune system. You may have a weakened immune system if you:  ? Are taking medicines that reduce (suppress) the activity of your immune system.  ? Are taking medicines to treat cancer (chemotherapy).  ? Have HIV or AIDS.  · You work in an environment where you may be exposed to a disease.  · You plan to travel outside of the country.  · You have a chronic illness, such as heart disease, kidney disease, diabetes, or lung disease.  Summary  · Before you get a vaccine,  tell your health care provider if you have reacted to vaccines in the past or have a condition that weakens your immune system.  · At 50-64 years, you should get a dose of the flu vaccine every year and a dose of the Td or Tdap vaccine every 10 years.  · You should get 2 doses of the RZV vaccine 2 to 6 months apart.  · Depending on your medical history and your risk factors, you may need other vaccines. Ask your health care provider whether you are up to date on all your vaccines.  This information is not intended to replace advice given to you by your health care provider. Make sure you discuss any questions you have with your health care provider.  Document Revised: 10/13/2020 Document Reviewed: 10/13/2020  [employer] Patient Education © 2021 [employer]    Diabetes Mellitus and Standards of Medical Care  Living with and managing diabetes (diabetes mellitus) can be complicated. Your diabetes treatment may be managed by a team of health care providers, including:  · A physician who specializes in diabetes (endocrinologist). You might also have visits with a nurse practitioner or physician assistant.  · Nurses.  · A registered dietitian.  · A certified diabetes care and .  · An exercise specialist.  · A pharmacist.  · An eye doctor.  · A foot specialist (podiatrist).  · A dental care provider.  · A primary care provider.  · A mental health care provider.  How to manage your diabetes  You can do many things to successfully manage your diabetes. Your health care providers will follow guidelines to help you get the best quality of care. Here are general guidelines for your diabetes management plan. Your health care providers may give you more specific instructions.  Physical exams  When you are diagnosed with diabetes, and each year after that, your health care provider will ask about your medical and family history. You will have a physical exam, which may include:  · Measuring your height, weight,  and body mass index (BMI).  · Checking your blood pressure. This will be done at every routine medical visit. Your target blood pressure may vary depending on your medical conditions, your age, and other factors.  · A thyroid exam.  · A skin exam.  · Screening for nerve damage (peripheral neuropathy). This may include checking the pulse in your legs and feet and the level of sensation in your hands and feet.  · A foot exam to inspect the structure and skin of your feet, including checking for cuts, bruises, redness, blisters, sores, or other problems.  · Screening for blood vessel (vascular) problems. This may include checking the pulse in your legs and feet and checking your temperature.  Blood tests  Depending on your treatment plan and your personal needs, you may have the following tests:  · Hemoglobin A1C (HbA1C). This test provides information about blood sugar (glucose) control over the previous 2-3 months. It is used to adjust your treatment plan, if needed. This test will be done:  ? At least 2 times a year, if you are meeting your treatment goals.  ? 4 times a year, if you are not meeting your treatment goals or if your goals have changed.  · Lipid testing, including total cholesterol, LDL and HDL cholesterol, and triglyceride levels.  ? The goal for LDL is less than 100 mg/dL (5.5 mmol/L). If you are at high risk for complications, the goal is less than 70 mg/dL (3.9 mmol/L).  ? The goal for HDL is 40 mg/dL (2.2 mmol/L) or higher for men, and 50 mg/dL (2.8 mmol/L) or higher for women. An HDL cholesterol of 60 mg/dL (3.3 mmol/L) or higher gives some protection against heart disease.  ? The goal for triglycerides is less than 150 mg/dL (8.3 mmol/L).  · Liver function tests.  · Kidney function tests.  · Thyroid function tests.    Dental and eye exams    · Visit your dentist two times a year.  · If you have type 1 diabetes, your health care provider may recommend an eye exam within 5 years after you are  diagnosed, and then once a year after your first exam.  ? For children with type 1 diabetes, the health care provider may recommend an eye exam when your child is age 11 or older and has had diabetes for 3-5 years. After the first exam, your child should get an eye exam once a year.  · If you have type 2 diabetes, your health care provider may recommend an eye exam as soon as you are diagnosed, and then every 1-2 years after your first exam.    Immunizations  · A yearly flu (influenza) vaccine is recommended annually for everyone 6 months or older. This is especially important if you have diabetes.  · The pneumonia (pneumococcal) vaccine is recommended for everyone 2 years or older who has diabetes. If you are age 65 or older, you may get the pneumonia vaccine as a series of two separate shots.  · The hepatitis B vaccine is recommended for adults shortly after being diagnosed with diabetes.  Adults and children with diabetes should receive all other vaccines according to age-specific recommendations from the Centers for Disease Control and Prevention (CDC).  Mental and emotional health  Screening for symptoms of eating disorders, anxiety, and depression is recommended at the time of diagnosis and after as needed. If your screening shows that you have symptoms, you may need more evaluation. You may work with a mental health care provider.  Follow these instructions at home:  Treatment plan  You will monitor your blood glucose levels and may give yourself insulin. Your treatment plan will be reviewed at every medical visit. You and your health care provider will discuss:  · How you are taking your medicines, including insulin.  · Any side effects you have.  · Your blood glucose level target goals.  · How often you monitor your blood glucose level.  · Lifestyle habits, such as activity level and tobacco, alcohol, and substance use.  Education  Your health care provider will assess how well you are monitoring your blood  glucose levels and whether you are taking your insulin and medicines correctly. He or she may refer you to:  · A certified diabetes care and  to manage your diabetes throughout your life, starting at diagnosis.  · A registered dietitian who can create and review your personal nutrition plan.  · An exercise specialist who can discuss your activity level and exercise plan.  General instructions  · Take over-the-counter and prescription medicines only as told by your health care provider.  · Keep all follow-up visits. This is important.  Where to find support  There are many diabetes support networks, including:  · American Diabetes Association (ADA): diabetes.org  · Defeat Diabetes Foundation: defeatdiabetes.org  Where to find more information  · American Diabetes Association (ADA): www.diabetes.org  · Association of Diabetes Care & Education Specialists (ADCES): diabeteseducator.org  · International Diabetes Federation (IDF): idf.org  Summary  · Managing diabetes (diabetes mellitus) can be complicated. Your diabetes treatment may be managed by a team of health care providers.  · Your health care providers follow guidelines to help you get the best quality care.  · You should have physical exams, blood tests, blood pressure monitoring, immunizations, and screening tests regularly. Stay updated on how to manage your diabetes.  · Your health care providers may also give you more specific instructions based on your individual health.  This information is not intended to replace advice given to you by your health care provider. Make sure you discuss any questions you have with your health care provider.  Document Revised: 06/24/2021 Document Reviewed: 06/24/2021  [employer] Patient Education © 2021 [employer]    Diabetes Mellitus and Exercise  Exercising regularly is important for overall health, especially for people who have diabetes mellitus. Exercising is not only about losing weight. It has many  "other health benefits, such as increasing muscle strength and bone density and reducing body fat and stress. This leads to improved fitness, flexibility, and endurance, all of which result in better overall health.  What are the benefits of exercise if I have diabetes?  Exercise has many benefits for people with diabetes. They include:  · Helping to lower and control blood sugar (glucose).  · Helping the body to respond better to the hormone insulin by improving insulin sensitivity.  · Reducing how much insulin the body needs.  · Lowering the risk for heart disease by:  ? Lowering \"bad\" cholesterol and triglyceride levels.  ? Increasing \"good\" cholesterol levels.  ? Lowering blood pressure.  ? Lowering blood glucose levels.  What is my activity plan?  Your health care provider or certified diabetes educator can help you make a plan for the type and frequency of exercise that works for you. This is called your activity plan. Be sure to:  · Get at least 150 minutes of medium-intensity or high-intensity exercise each week. Exercises may include brisk walking, biking, or water aerobics.  · Do stretching and strengthening exercises, such as yoga or weight lifting, at least 2 times a week.  · Spread out your activity over at least 3 days of the week.  · Get some form of physical activity each day.  ? Do not go more than 2 days in a row without some kind of physical activity.  ? Avoid being inactive for more than 90 minutes at a time. Take frequent breaks to walk or stretch.  · Choose exercises or activities that you enjoy. Set realistic goals.  · Start slowly and gradually increase your exercise intensity over time.  How do I manage my diabetes during exercise?    Monitor your blood glucose  · Check your blood glucose before and after exercising. If your blood glucose is:  ? 240 mg/dL (13.3 mmol/L) or higher before you exercise, check your urine for ketones. These are chemicals created by the liver. If you have ketones in " your urine, do not exercise until your blood glucose returns to normal.  ? 100 mg/dL (5.6 mmol/L) or lower, eat a snack containing 15-20 grams of carbohydrate. Check your blood glucose 15 minutes after the snack to make sure that your glucose level is above 100 mg/dL (5.6 mmol/L) before you start your exercise.  · Know the symptoms of low blood glucose (hypoglycemia) and how to treat it. Your risk for hypoglycemia increases during and after exercise.  Follow these tips and your health care provider's instructions  · Keep a carbohydrate snack that is fast-acting for use before, during, and after exercise to help prevent or treat hypoglycemia.  · Avoid injecting insulin into areas of the body that are going to be exercised. For example, avoid injecting insulin into:  ? Your arms, when you are about to play tennis.  ? Your legs, when you are about to go jogging.  · Keep records of your exercise habits. Doing this can help you and your health care provider adjust your diabetes management plan as needed. Write down:  ? Food that you eat before and after you exercise.  ? Blood glucose levels before and after you exercise.  ? The type and amount of exercise you have done.  · Work with your health care provider when you start a new exercise or activity. He or she may need to:  ? Make sure that the activity is safe for you.  ? Adjust your insulin, other medicines, and food that you eat.  · Drink plenty of water while you exercise. This prevents loss of water (dehydration) and problems caused by a lot of heat in the body (heat stroke).  Where to find more information  · American Diabetes Association: www.diabetes.org  Summary  · Exercising regularly is important for overall health, especially for people who have diabetes mellitus.  · Exercising has many health benefits. It increases muscle strength and bone density and reduces body fat and stress. It also lowers and controls blood glucose.  · Your health care provider or  certified diabetes educator can help you make an activity plan for the type and frequency of exercise that works for you.  · Work with your health care provider to make sure any new activity is safe for you. Also work with your health care provider to adjust your insulin, other medicines, and the food you eat.  This information is not intended to replace advice given to you by your health care provider. Make sure you discuss any questions you have with your health care provider.  Document Revised: 09/14/2020 Document Reviewed: 09/14/2020  [employer] Patient Education © 2021 [employer]    Type 2 Diabetes Mellitus, Self-Care, Adult  Caring for yourself after you have been diagnosed with type 2 diabetes (type 2 diabetes mellitus) means keeping your blood sugar (glucose) under control with a balance of:  · Nutrition.  · Exercise.  · Lifestyle changes.  · Medicines or insulin, if needed.  · Support from your team of health care providers and others.  The following information explains what you need to know to manage your diabetes at home.  What are the risks?  Having diabetes can put you at risk for other long-term (chronic) conditions, such as heart disease and kidney disease. Your health care provider may prescribe medicines to help prevent complications from diabetes.  How to monitor blood glucose    · Check your blood glucose every day, as often as told by your health care provider.  · Have your A1C (hemoglobin A1C) level checked two or more times a year, or as often as told by your health care provider.  · Your health care provider will set personalized treatment goals for you. Generally, the goal of treatment is to maintain the following blood glucose levels:  ? Before meals:  mg/dL (4.4-7.2 mmol/L).  ? After meals: below 180 mg/dL (10 mmol/L).  ? A1C level: less than 7%.  How to manage hyperglycemia and hypoglycemia  Hyperglycemia symptoms  Hyperglycemia, also called high blood glucose, occurs when blood  glucose is too high. Make sure you know the early signs of hyperglycemia, such as:  · Increased thirst.  · Hunger.  · Feeling very tired.  · Needing to urinate more often than usual.  · Blurry vision.  Hypoglycemia symptoms  Hypoglycemia, also called low blood glucose, occurs with a blood glucose level at or below 70 mg/dL (3.9 mmol/L). Diabetes medicines lower your blood glucose and can cause hypoglycemia. The risk for hypoglycemia increases during or after exercise, during sleep, during illness, and when skipping meals or not eating for a long time (fasting).  It is important to know the symptoms of hypoglycemia and treat it right away. Always have a 15-gram rapid-acting carbohydrate snack with you to treat low blood glucose. Family members and close friends should also know the symptoms and understand how to treat hypoglycemia, in case you are not able to treat yourself. Symptoms may include:  · Hunger.  · Anxiety.  · Sweating and feeling clammy.  · Dizziness or feeling light-headed.  · Sleepiness.  · Increased heart rate.  · Irritability.  · Tingling or numbness around the mouth, lips, or tongue.  · Restless sleep.  Severe hypoglycemia is when your blood glucose level is at or below 54 mg/dL (3 mmol/L). Severe hypoglycemia is an emergency. Do not wait to see if the symptoms will go away. Get medical help right away. Call your local emergency services (911 in the [place] Do not drive yourself to the hospital.  If you have severe hypoglycemia and you cannot eat or drink, you may need glucagon. A family member or close friend should learn how to check your blood glucose and how to give you glucagon. Ask your health care provider if you need to have an emergency glucagon kit available.  Follow these instructions at home:  Medicines  · Take diabetes medicines as told by your health care provider. If your health care provider prescribed insulin or diabetes medicines, take them every day.  · Do not run out of insulin or  other diabetes medicines. Plan ahead so you always have these available.  · If you use insulin, adjust your dosage based on your physical activity and what foods you eat. Your health care provider will tell you how to adjust your dosage.  · Take over-the-counter and prescription medicines only as told by your health care provider.  Eating and drinking    What you eat and drink affects your blood glucose and your insulin dosage. Making good choices helps to control your diabetes and prevent other health problems. A healthy meal plan includes eating lean proteins, complex carbohydrates, fresh fruits and vegetables, low-fat dairy products, and healthy fats.  Make an appointment to see a registered dietitian to help you create an eating plan that is right for you. Make sure that you:  · Follow instructions from your health care provider about eating or drinking restrictions.  · Drink enough fluid to keep your urine pale yellow.  · Keep a record of the carbohydrates that you eat. Do this by reading food labels and learning the standard serving sizes of foods.  · Follow your sick-day plan whenever you cannot eat or drink as usual. Make this plan in advance with your health care provider.    Activity  · Stay active. Exercise regularly, as told by your health care provider. This may include:  ? Stretching and doing strength exercises, such as yoga or weight lifting, 2 or more times a week.  ? Doing 150 minutes or more of moderate-intensity or vigorous-intensity exercise each week. This could be brisk walking, biking, or water aerobics.  § Spread out your activity over 3 or more days of the week.  § Do not go more than 2 days in a row without doing some kind of physical activity.  · When you start a new exercise or activity, work with your health care provider to adjust your insulin, medicines, or food intake as needed.  Lifestyle  · Do not use any products that contain nicotine or tobacco, such as cigarettes, e-cigarettes,  and chewing tobacco. If you need help quitting, ask your health care provider.  · If your health care provider says that alcohol is safe for you, limit how much you use to no more than 1 drink a day for women who are not pregnant and 2 drinks a day for men. In the [place] one drink equals one 12 oz bottle of beer (355 mL), one 5 oz glass of wine (148 mL), or one 1½ oz glass of hard liquor (44 mL).  · Learn to manage stress. If you need help with this, ask your health care provider.  Take care of your body    · Keep your immunizations up to date. In addition to getting vaccinations as told by your health care provider, it is recommended that you get vaccinated against the following illnesses:  ? The flu (influenza). Get a flu shot every year.  ? Pneumonia.  ? Hepatitis B.  · Schedule an eye exam soon after your diagnosis, and then one time every year after that.  · Check your skin and feet every day for cuts, bruises, redness, blisters, or sores. Schedule a foot exam with your health care provider once every year.  · Brush your teeth and gums two times a day, and floss one or more times a day. Visit your dentist one or more times every 6 months.  · Maintain a healthy weight.    General instructions  · Share your diabetes management plan with people in your workplace, school, and household.  · Carry a medical alert card or wear medical alert jewelry.  · Keep all follow-up visits as told by your health care provider. This is important.  Questions to ask your health care provider  · Should I meet with a certified diabetes care and ?  · Where can I find a support group for people with diabetes?  Where to find more information  · American Diabetes Association (ADA): www.diabetes.org  · American Association of Diabetes Care and Education Specialists (ADCES): www.diabeteseducator.org  · International Diabetes Federation (IDF): www.idf.org  Summary  · Caring for yourself after you have been diagnosed with  "type 2 diabetes (type 2 diabetes mellitus) means keeping your blood sugar (glucose) under control with a balance of nutrition, exercise, lifestyle changes, and medicine.  · Check your blood glucose every day, as often as told by your health care provider.  · Having diabetes can put you at risk for other long-term (chronic) conditions, such as heart disease and kidney disease. Your health care provider may prescribe medicines to help prevent complications from diabetes.  · Share your diabetes management plan with people in your workplace, school, and household.  · Keep all follow-up visits as told by your health care provider. This is important.  This information is not intended to replace advice given to you by your health care provider. Make sure you discuss any questions you have with your health care provider.  Document Revised: 01/25/2021 Document Reviewed: 01/26/2021  [employer] Patient Education © 2021 [employer]  https://www.cdc.gov/vaccines/hcp/vis/vis-statements/tdap.pdf\">   Tdap (Tetanus, Diphtheria, Pertussis) Vaccine: What You Need to Know  1. Why get vaccinated?  Tdap vaccine can prevent tetanus, diphtheria, and pertussis.  Diphtheria and pertussis spread from person to person. Tetanus enters the body through cuts or wounds.  · TETANUS (T) causes painful stiffening of the muscles. Tetanus can lead to serious health problems, including being unable to open the mouth, having trouble swallowing and breathing, or death.  · DIPHTHERIA (D) can lead to difficulty breathing, heart failure, paralysis, or death.  · PERTUSSIS (aP), also known as \"whooping cough,\" can cause uncontrollable, violent coughing which makes it hard to breathe, eat, or drink. Pertussis can be extremely serious in babies and young children, causing pneumonia, convulsions, brain damage, or death. In teens and adults, it can cause weight loss, loss of bladder control, passing out, and rib fractures from severe coughing.  2. Tdap " vaccine  Tdap is only for children 7 years and older, adolescents, and adults.   Adolescents should receive a single dose of Tdap, preferably at age 11 or 12 years.  Pregnant women should get a dose of Tdap during every pregnancy, to protect the  from pertussis. Infants are most at risk for severe, life-threatening complications from pertussis.  Adults who have never received Tdap should get a dose of Tdap.  Also, adults should receive a booster dose every 10 years, or earlier in the case of a severe and dirty wound or burn. Booster doses can be either Tdap or Td (a different vaccine that protects against tetanus and diphtheria but not pertussis).  Tdap may be given at the same time as other vaccines.  3. Talk with your health care provider  Tell your vaccine provider if the person getting the vaccine:  · Has had an allergic reaction after a previous dose of any vaccine that protects against tetanus, diphtheria, or pertussis, or has any severe, life-threatening allergies.  · Has had a coma, decreased level of consciousness, or prolonged seizures within 7 days after a previous dose of any pertussis vaccine (DTP, DTaP, or Tdap).  · Has seizures or another nervous system problem.  · Has ever had Guillain-Barré Syndrome (also called GBS).  · Has had severe pain or swelling after a previous dose of any vaccine that protects against tetanus or diphtheria.  In some cases, your health care provider may decide to postpone Tdap vaccination to a future visit.   People with minor illnesses, such as a cold, may be vaccinated. People who are moderately or severely ill should usually wait until they recover before getting Tdap vaccine.   Your health care provider can give you more information.  4. Risks of a vaccine reaction  · Pain, redness, or swelling where the shot was given, mild fever, headache, feeling tired, and nausea, vomiting, diarrhea, or stomachache sometimes happen after Tdap vaccine.  People sometimes faint  after medical procedures, including vaccination. Tell your provider if you feel dizzy or have vision changes or ringing in the ears.   As with any medicine, there is a very remote chance of a vaccine causing a severe allergic reaction, other serious injury, or death.  5. What if there is a serious problem?  An allergic reaction could occur after the vaccinated person leaves the clinic. If you see signs of a severe allergic reaction (hives, swelling of the face and throat, difficulty breathing, a fast heartbeat, dizziness, or weakness), call 9-1-1 and get the person to the nearest hospital.  For other signs that concern you, call your health care provider.   Adverse reactions should be reported to the Vaccine Adverse Event Reporting System (VAERS). Your health care provider will usually file this report, or you can do it yourself. Visit the VAERS website at www.vaers.hhs.gov or call 1-433.508.2090. VAERS is only for reporting reactions, and VAERS staff do not give medical advice.  6. The National Vaccine Injury Compensation Program  The National Vaccine Injury Compensation Program (VICP) is a federal program that was created to compensate people who may have been injured by certain vaccines. Visit the VICP website at www.hrsa.gov/vaccinecompensation or call 1-621.883.9641 to learn about the program and about filing a claim. There is a time limit to file a claim for compensation.  7. How can I learn more?  · Ask your health care provider.  · Call your local or state health department.  · Contact the Centers for Disease Control and Prevention (CDC):  ? Call 1-569.816.7356 (6-487-HQZ-INFO) or  ? Visit CDC's website at www.cdc.gov/vaccines  Vaccine Information Statement Tdap (Tetanus, Diphtheria, Pertussis) Vaccine (04/01/2020)  This information is not intended to replace advice given to you by your health care provider. Make sure you discuss any questions you have with your health care provider.  Document Revised:  04/10/2020 Document Reviewed: 04/13/2020  [employer] Patient Education © 2021 [employer]    Chronic Knee Pain, Adult  Chronic knee pain is pain in one or both knees that lasts longer than 3 months. Symptoms of chronic knee pain may include swelling, stiffness, and discomfort. Age-related wear and tear (osteoarthritis) of the knee joint is the most common cause of chronic knee pain. Other possible causes include:  · A long-term immune-related disease that causes inflammation of the knee (rheumatoid arthritis). This usually affects both knees.  · Inflammatory arthritis, such as gout or pseudogout.  · An injury to the knee that causes arthritis.  · An injury to the knee that damages the ligaments. Ligaments are strong tissues that connect bones to each other.  · Runner's knee or pain behind the kneecap.  Treatment for chronic knee pain depends on the cause. The main treatments for chronic knee pain are physical therapy and weight loss. This condition may also be treated with medicines, injections, a knee sleeve or brace, and by using crutches. Rest, ice, pressure (compression), and elevation, also known as RICE therapy, may also be recommended.  Follow these instructions at home:  If you have a knee sleeve or brace:    · Wear the knee sleeve or brace as told by your health care provider. Remove it only as told by your health care provider.  · Loosen it if your toes tingle, become numb, or turn cold and blue.  · Keep it clean.  · If the sleeve or brace is not waterproof:  ? Do not let it get wet.  ? Remove it if allowed by your health care provider, or cover it with a watertight covering when you take a bath or a shower.    Managing pain, stiffness, and swelling         · If directed, apply heat to the affected area as often as told by your health care provider. Use the heat source that your health care provider recommends, such as a moist heat pack or a heating pad.  ? If you have a removable knee sleeve or brace,  remove it as told by your health care provider.  ? Place a towel between your skin and the heat source.  ? Leave the heat on for 20-30 minutes.  ? Remove the heat if your skin turns bright red. This is especially important if you are unable to feel pain, heat, or cold. You may have a greater risk of getting burned.  · If directed, put ice on the affected area. To do this:  ? If you have a removable knee sleeve or brace, remove it as told by your health care provider.  ? Put ice in a plastic bag.  ? Place a towel between your skin and the bag.  ? Leave the ice on for 20 minutes, 2-3 times a day.  ? Remove the ice if your skin turns bright red. This is very important. If you cannot feel pain, heat, or cold, you have a greater risk of damage to the area.  · Move your toes often to reduce stiffness and swelling.  · Raise (elevate) the injured area above the level of your heart while you are sitting or lying down.  Activity  · Avoid high-impact activities or exercises, such as running, jumping rope, or doing jumping jacks.  · Follow the exercise plan that your health care provider designed for you. Your health care provider may suggest that you:  ? Avoid activities that make knee pain worse. This may require you to change your exercise routines, sport participation, or job duties.  ? Wear shoes with cushioned soles.  ? Avoid sports that require running and sudden changes in direction.  ? Do physical therapy. Physical therapy is planned to match your needs and abilities. It may include exercises for strength, flexibility, stability, and endurance.  ? Do exercises that increase balance and strength, such as marilyn chi and yoga.  · Do not use the injured limb to support your body weight until your health care provider says that you can. Use crutches as told by your health care provider.  · Return to your normal activities as told by your health care provider. Ask your health care provider what activities are safe for  you.  General instructions  · Take over-the-counter and prescription medicines only as told by your health care provider.  · Lose weight if you are overweight. Losing even a little weight can reduce knee pain. Ask your health care provider what your ideal weight is, and how to safely lose extra weight. A dietitian may be able to help you plan your meals.  · Do not use any products that contain nicotine or tobacco, such as cigarettes, e-cigarettes, and chewing tobacco. These can delay healing. If you need help quitting, ask your health care provider.  · Keep all follow-up visits. This is important.  Contact a health care provider if:  · You have knee pain that is not getting better or gets worse.  · You are unable to do your physical therapy exercises due to knee pain.  Get help right away if:  · Your knee swells and the swelling becomes worse.  · You cannot move your knee.  · You have severe knee pain.  Summary  · Knee pain that lasts more than 3 months is considered chronic knee pain.  · The main treatments for chronic knee pain are physical therapy and weight loss. You may also need to take medicines, wear a knee sleeve or brace, use crutches, and apply ice or heat.  · Losing even a little weight can reduce knee pain. Ask your health care provider what your ideal weight is, and how to safely lose extra weight. A dietitian may be able to help you plan your meals.  · Follow the exercise plan that your health care provider designed for you.  This information is not intended to replace advice given to you by your health care provider. Make sure you discuss any questions you have with your health care provider.  Document Revised: 06/02/2021 Document Reviewed: 06/02/2021  [employer] Patient Education © 2021 [employer]    Benign Prostatic Hyperplasia    Benign prostatic hyperplasia (BPH) is an enlarged prostate gland that is caused by the normal aging process and not by cancer. The prostate is a walnut-sized gland that  is involved in the production of semen. It is located in front of the rectum and below the bladder. The bladder stores urine and the urethra is the tube that carries the urine out of the body. The prostate may get bigger as a man gets older.  An enlarged prostate can press on the urethra. This can make it harder to pass urine. The build-up of urine in the bladder can cause infection. Back pressure and infection may progress to bladder damage and kidney (renal) failure.  What are the causes?  This condition is part of a normal aging process. However, not all men develop problems from this condition. If the prostate enlarges away from the urethra, urine flow will not be blocked. If it enlarges toward the urethra and compresses it, there will be problems passing urine.  What increases the risk?  This condition is more likely to develop in men over the age of 50 years.  What are the signs or symptoms?  Symptoms of this condition include:  · Getting up often during the night to urinate.  · Needing to urinate frequently during the day.  · Difficulty starting urine flow.  · Decrease in size and strength of your urine stream.  · Leaking (dribbling) after urinating.  · Inability to pass urine. This needs immediate treatment.  · Inability to completely empty your bladder.  · Pain when you pass urine. This is more common if there is also an infection.  · Urinary tract infection (UTI).  How is this diagnosed?  This condition is diagnosed based on your medical history, a physical exam, and your symptoms. Tests will also be done, such as:  · A post-void bladder scan. This measures any amount of urine that may remain in your bladder after you finish urinating.  · A digital rectal exam. In a rectal exam, your health care provider checks your prostate by putting a lubricated, gloved finger into your rectum to feel the back of your prostate gland. This exam detects the size of your gland and any abnormal lumps or growths.  · An exam  of your urine (urinalysis).  · A prostate specific antigen (PSA) screening. This is a blood test used to screen for prostate cancer.  · An ultrasound. This test uses sound waves to electronically produce a picture of your prostate gland.  Your health care provider may refer you to a specialist in kidney and prostate diseases (urologist).  How is this treated?  Once symptoms begin, your health care provider will monitor your condition (active surveillance or watchful waiting). Treatment for this condition will depend on the severity of your condition. Treatment may include:  · Observation and yearly exams. This may be the only treatment needed if your condition and symptoms are mild.  · Medicines to relieve your symptoms, including:  ? Medicines to shrink the prostate.  ? Medicines to relax the muscle of the prostate.  · Surgery in severe cases. Surgery may include:  ? Prostatectomy. In this procedure, the prostate tissue is removed completely through an open incision or with a laparoscope or robotics.  ? Transurethral resection of the prostate (TURP). In this procedure, a tool is inserted through the opening at the tip of the penis (urethra). It is used to cut away tissue of the inner core of the prostate. The pieces are removed through the same opening of the penis. This removes the blockage.  ? Transurethral incision (TUIP). In this procedure, small cuts are made in the prostate. This lessens the prostate's pressure on the urethra.  ? Transurethral microwave thermotherapy (TUMT). This procedure uses microwaves to create heat. The heat destroys and removes a small amount of prostate tissue.  ? Transurethral needle ablation (TUNA). This procedure uses radio frequencies to destroy and remove a small amount of prostate tissue.  ? Interstitial laser coagulation (ILC). This procedure uses a laser to destroy and remove a small amount of prostate tissue.  ? Transurethral electrovaporization (TUVP). This procedure uses  electrodes to destroy and remove a small amount of prostate tissue.  ? Prostatic urethral lift. This procedure inserts an implant to push the lobes of the prostate away from the urethra.  Follow these instructions at home:  · Take over-the-counter and prescription medicines only as told by your health care provider.  · Monitor your symptoms for any changes. Contact your health care provider with any changes.  · Avoid drinking large amounts of liquid before going to bed or out in public.  · Avoid or reduce how much caffeine or alcohol you drink.  · Give yourself time when you urinate.  · Keep all follow-up visits as told by your health care provider. This is important.  Contact a health care provider if:  · You have unexplained back pain.  · Your symptoms do not get better with treatment.  · You develop side effects from the medicine you are taking.  · Your urine becomes very dark or has a bad smell.  · Your lower abdomen becomes distended and you have trouble passing your urine.  Get help right away if:  · You have a fever or chills.  · You suddenly cannot urinate.  · You feel lightheaded, or very dizzy, or you faint.  · There are large amounts of blood or clots in the urine.  · Your urinary problems become hard to manage.  · You develop moderate to severe low back or flank pain. The flank is the side of your body between the ribs and the hip.  These symptoms may represent a serious problem that is an emergency. Do not wait to see if the symptoms will go away. Get medical help right away. Call your local emergency services (911 in the [place] Do not drive yourself to the hospital.  Summary  · Benign prostatic hyperplasia (BPH) is an enlarged prostate that is caused by the normal aging process and not by cancer.  · An enlarged prostate can press on the urethra. This can make it hard to pass urine.  · This condition is part of a normal aging process and is more likely to develop in men over the age of 50 years.  · Get  help right away if you suddenly cannot urinate.  This information is not intended to replace advice given to you by your health care provider. Make sure you discuss any questions you have with your health care provider.  Document Revised: 11/12/2019 Document Reviewed: 01/22/2018  Elsevier Patient Education © 2021 [employer]

## 2022-02-28 ENCOUNTER — APPOINTMENT (OUTPATIENT)
Dept: PREADMISSION TESTING | Facility: HOSPITAL | Age: 62
End: 2022-02-28

## 2022-02-28 LAB — SARS-COV-2 RNA PNL SPEC NAA+PROBE: NOT DETECTED

## 2022-02-28 PROCEDURE — U0004 COV-19 TEST NON-CDC HGH THRU: HCPCS | Performed by: UROLOGY

## 2022-03-01 ENCOUNTER — ANESTHESIA EVENT (OUTPATIENT)
Dept: PERIOP | Facility: HOSPITAL | Age: 62
End: 2022-03-01

## 2022-03-02 ENCOUNTER — HOSPITAL ENCOUNTER (OUTPATIENT)
Facility: HOSPITAL | Age: 62
Setting detail: HOSPITAL OUTPATIENT SURGERY
Discharge: HOME | End: 2022-03-02
Attending: UROLOGY | Admitting: UROLOGY

## 2022-03-02 ENCOUNTER — ANESTHESIA (OUTPATIENT)
Dept: PERIOP | Facility: HOSPITAL | Age: 62
End: 2022-03-02

## 2022-03-02 VITALS
OXYGEN SATURATION: 93 % | HEART RATE: 69 BPM | BODY MASS INDEX: 34.33 KG/M2 | DIASTOLIC BLOOD PRESSURE: 85 MMHG | SYSTOLIC BLOOD PRESSURE: 136 MMHG | HEIGHT: 73 IN | RESPIRATION RATE: 14 BRPM | WEIGHT: 259 LBS | TEMPERATURE: 97.4 F

## 2022-03-02 DIAGNOSIS — N13.8 BPH WITH OBSTRUCTION/LOWER URINARY TRACT SYMPTOMS: Primary | ICD-10-CM

## 2022-03-02 DIAGNOSIS — N40.1 BPH WITH OBSTRUCTION/LOWER URINARY TRACT SYMPTOMS: Primary | ICD-10-CM

## 2022-03-02 LAB
ANION GAP SERPL CALCULATED.3IONS-SCNC: 13 MMOL/L (ref 5–15)
BUN SERPL-MCNC: 14 MG/DL (ref 8–23)
BUN/CREAT SERPL: 12.6 (ref 7–25)
CALCIUM SPEC-SCNC: 9.5 MG/DL (ref 8.6–10.5)
CHLORIDE SERPL-SCNC: 100 MMOL/L (ref 98–107)
CO2 SERPL-SCNC: 24 MMOL/L (ref 22–29)
CREAT SERPL-MCNC: 1.11 MG/DL (ref 0.76–1.27)
DEPRECATED RDW RBC AUTO: 38.6 FL (ref 37–54)
EGFRCR SERPLBLD CKD-EPI 2021: 74.6 ML/MIN/1.73
ERYTHROCYTE [DISTWIDTH] IN BLOOD BY AUTOMATED COUNT: 12.3 % (ref 12.3–15.4)
GLUCOSE BLDC GLUCOMTR-MCNC: 182 MG/DL (ref 70–130)
GLUCOSE SERPL-MCNC: 178 MG/DL (ref 65–99)
HCT VFR BLD AUTO: 43.6 % (ref 37.5–51)
HGB BLD-MCNC: 15 G/DL (ref 13–17.7)
MCH RBC QN AUTO: 29.7 PG (ref 26.6–33)
MCHC RBC AUTO-ENTMCNC: 34.4 G/DL (ref 31.5–35.7)
MCV RBC AUTO: 86.3 FL (ref 79–97)
PLATELET # BLD AUTO: 220 10*3/MM3 (ref 140–450)
PMV BLD AUTO: 10.5 FL (ref 6–12)
POTASSIUM SERPL-SCNC: 3.5 MMOL/L (ref 3.5–5.2)
QT INTERVAL: 446 MS
QTC INTERVAL: 488 MS
RBC # BLD AUTO: 5.05 10*6/MM3 (ref 4.14–5.8)
SODIUM SERPL-SCNC: 137 MMOL/L (ref 136–145)
WBC NRBC COR # BLD: 5.46 10*3/MM3 (ref 3.4–10.8)

## 2022-03-02 PROCEDURE — 93005 ELECTROCARDIOGRAM TRACING: CPT | Performed by: ANESTHESIOLOGY

## 2022-03-02 PROCEDURE — 25010000002 ONDANSETRON PER 1 MG: Performed by: NURSE ANESTHETIST, CERTIFIED REGISTERED

## 2022-03-02 PROCEDURE — 80048 BASIC METABOLIC PNL TOTAL CA: CPT | Performed by: ANESTHESIOLOGY

## 2022-03-02 PROCEDURE — 93010 ELECTROCARDIOGRAM REPORT: CPT | Performed by: STUDENT IN AN ORGANIZED HEALTH CARE EDUCATION/TRAINING PROGRAM

## 2022-03-02 PROCEDURE — 25010000002 FENTANYL CITRATE (PF) 50 MCG/ML SOLUTION: Performed by: NURSE ANESTHETIST, CERTIFIED REGISTERED

## 2022-03-02 PROCEDURE — 25010000002 DROPERIDOL PER 5 MG

## 2022-03-02 PROCEDURE — 25010000002 DEXAMETHASONE PER 1 MG: Performed by: NURSE ANESTHETIST, CERTIFIED REGISTERED

## 2022-03-02 PROCEDURE — 25010000002 LEVOFLOXACIN PER 250 MG: Performed by: UROLOGY

## 2022-03-02 PROCEDURE — 25010000002 FENTANYL CITRATE (PF) 250 MCG/5ML SOLUTION

## 2022-03-02 PROCEDURE — 25010000002 PROPOFOL 10 MG/ML EMULSION: Performed by: NURSE ANESTHETIST, CERTIFIED REGISTERED

## 2022-03-02 PROCEDURE — 85027 COMPLETE CBC AUTOMATED: CPT | Performed by: UROLOGY

## 2022-03-02 PROCEDURE — 82962 GLUCOSE BLOOD TEST: CPT

## 2022-03-02 RX ORDER — DEXMEDETOMIDINE HYDROCHLORIDE 100 UG/ML
INJECTION, SOLUTION INTRAVENOUS AS NEEDED
Status: DISCONTINUED | OUTPATIENT
Start: 2022-03-02 | End: 2022-03-02

## 2022-03-02 RX ORDER — MEPERIDINE HYDROCHLORIDE 25 MG/ML
12.5 INJECTION INTRAMUSCULAR; INTRAVENOUS; SUBCUTANEOUS
Status: DISCONTINUED | OUTPATIENT
Start: 2022-03-02 | End: 2022-03-02 | Stop reason: HOSPADM

## 2022-03-02 RX ORDER — HYDROCODONE BITARTRATE AND ACETAMINOPHEN 10; 325 MG/1; MG/1
1 TABLET ORAL EVERY 6 HOURS PRN
Qty: 25 TABLET | Refills: 0 | Status: SHIPPED | OUTPATIENT
Start: 2022-03-02 | End: 2022-07-12

## 2022-03-02 RX ORDER — NALOXONE HCL 0.4 MG/ML
0.4 VIAL (ML) INJECTION AS NEEDED
Status: DISCONTINUED | OUTPATIENT
Start: 2022-03-02 | End: 2022-03-02 | Stop reason: HOSPADM

## 2022-03-02 RX ORDER — DROPERIDOL 2.5 MG/ML
0.62 INJECTION, SOLUTION INTRAMUSCULAR; INTRAVENOUS AS NEEDED
Status: DISCONTINUED | OUTPATIENT
Start: 2022-03-02 | End: 2022-03-02 | Stop reason: HOSPADM

## 2022-03-02 RX ORDER — FENTANYL CITRATE 50 UG/ML
50 INJECTION, SOLUTION INTRAMUSCULAR; INTRAVENOUS
Status: DISCONTINUED | OUTPATIENT
Start: 2022-03-02 | End: 2022-03-02 | Stop reason: HOSPADM

## 2022-03-02 RX ORDER — FAMOTIDINE 20 MG/1
20 TABLET, FILM COATED ORAL ONCE
Status: COMPLETED | OUTPATIENT
Start: 2022-03-02 | End: 2022-03-02

## 2022-03-02 RX ORDER — SODIUM CHLORIDE 9 MG/ML
INJECTION, SOLUTION INTRAVENOUS CONTINUOUS PRN
Status: DISCONTINUED | OUTPATIENT
Start: 2022-03-02 | End: 2022-03-02 | Stop reason: SURG

## 2022-03-02 RX ORDER — LIDOCAINE HYDROCHLORIDE 10 MG/ML
0.5 INJECTION, SOLUTION EPIDURAL; INFILTRATION; INTRACAUDAL; PERINEURAL ONCE AS NEEDED
Status: COMPLETED | OUTPATIENT
Start: 2022-03-02 | End: 2022-03-02

## 2022-03-02 RX ORDER — HYDROCODONE BITARTRATE AND ACETAMINOPHEN 5; 325 MG/1; MG/1
1 TABLET ORAL ONCE AS NEEDED
Status: DISCONTINUED | OUTPATIENT
Start: 2022-03-02 | End: 2022-03-02 | Stop reason: HOSPADM

## 2022-03-02 RX ORDER — LABETALOL HYDROCHLORIDE 5 MG/ML
5 INJECTION, SOLUTION INTRAVENOUS
Status: DISCONTINUED | OUTPATIENT
Start: 2022-03-02 | End: 2022-03-02 | Stop reason: HOSPADM

## 2022-03-02 RX ORDER — IPRATROPIUM BROMIDE AND ALBUTEROL SULFATE 2.5; .5 MG/3ML; MG/3ML
3 SOLUTION RESPIRATORY (INHALATION) ONCE AS NEEDED
Status: DISCONTINUED | OUTPATIENT
Start: 2022-03-02 | End: 2022-03-02 | Stop reason: HOSPADM

## 2022-03-02 RX ORDER — DROPERIDOL 2.5 MG/ML
INJECTION, SOLUTION INTRAMUSCULAR; INTRAVENOUS
Status: COMPLETED
Start: 2022-03-02 | End: 2022-03-02

## 2022-03-02 RX ORDER — HYDROMORPHONE HYDROCHLORIDE 1 MG/ML
0.5 INJECTION, SOLUTION INTRAMUSCULAR; INTRAVENOUS; SUBCUTANEOUS
Status: DISCONTINUED | OUTPATIENT
Start: 2022-03-02 | End: 2022-03-02 | Stop reason: HOSPADM

## 2022-03-02 RX ORDER — DEXAMETHASONE SODIUM PHOSPHATE 4 MG/ML
INJECTION, SOLUTION INTRA-ARTICULAR; INTRALESIONAL; INTRAMUSCULAR; INTRAVENOUS; SOFT TISSUE AS NEEDED
Status: DISCONTINUED | OUTPATIENT
Start: 2022-03-02 | End: 2022-03-02 | Stop reason: SURG

## 2022-03-02 RX ORDER — PROMETHAZINE HYDROCHLORIDE 25 MG/1
25 TABLET ORAL ONCE AS NEEDED
Status: DISCONTINUED | OUTPATIENT
Start: 2022-03-02 | End: 2022-03-02 | Stop reason: HOSPADM

## 2022-03-02 RX ORDER — CIPROFLOXACIN 500 MG/1
500 TABLET, FILM COATED ORAL 2 TIMES DAILY
Qty: 28 TABLET | Refills: 0 | Status: SHIPPED | OUTPATIENT
Start: 2022-03-02 | End: 2022-07-12

## 2022-03-02 RX ORDER — MIDAZOLAM HYDROCHLORIDE 1 MG/ML
1 INJECTION INTRAMUSCULAR; INTRAVENOUS
Status: DISCONTINUED | OUTPATIENT
Start: 2022-03-02 | End: 2022-03-02 | Stop reason: HOSPADM

## 2022-03-02 RX ORDER — FENTANYL CITRATE 50 UG/ML
INJECTION, SOLUTION INTRAMUSCULAR; INTRAVENOUS
Status: COMPLETED
Start: 2022-03-02 | End: 2022-03-02

## 2022-03-02 RX ORDER — HYDRALAZINE HYDROCHLORIDE 20 MG/ML
5 INJECTION INTRAMUSCULAR; INTRAVENOUS
Status: DISCONTINUED | OUTPATIENT
Start: 2022-03-02 | End: 2022-03-02 | Stop reason: HOSPADM

## 2022-03-02 RX ORDER — LIDOCAINE HYDROCHLORIDE 10 MG/ML
INJECTION, SOLUTION EPIDURAL; INFILTRATION; INTRACAUDAL; PERINEURAL AS NEEDED
Status: DISCONTINUED | OUTPATIENT
Start: 2022-03-02 | End: 2022-03-02 | Stop reason: SURG

## 2022-03-02 RX ORDER — SODIUM CHLORIDE 0.9 % (FLUSH) 0.9 %
10 SYRINGE (ML) INJECTION AS NEEDED
Status: DISCONTINUED | OUTPATIENT
Start: 2022-03-02 | End: 2022-03-02 | Stop reason: HOSPADM

## 2022-03-02 RX ORDER — SODIUM CHLORIDE 0.9 % (FLUSH) 0.9 %
3-10 SYRINGE (ML) INJECTION AS NEEDED
Status: DISCONTINUED | OUTPATIENT
Start: 2022-03-02 | End: 2022-03-02 | Stop reason: HOSPADM

## 2022-03-02 RX ORDER — FENTANYL CITRATE 50 UG/ML
INJECTION, SOLUTION INTRAMUSCULAR; INTRAVENOUS AS NEEDED
Status: DISCONTINUED | OUTPATIENT
Start: 2022-03-02 | End: 2022-03-02 | Stop reason: SURG

## 2022-03-02 RX ORDER — MAGNESIUM HYDROXIDE 1200 MG/15ML
LIQUID ORAL AS NEEDED
Status: DISCONTINUED | OUTPATIENT
Start: 2022-03-02 | End: 2022-03-02 | Stop reason: HOSPADM

## 2022-03-02 RX ORDER — ONDANSETRON 2 MG/ML
4 INJECTION INTRAMUSCULAR; INTRAVENOUS ONCE AS NEEDED
Status: DISCONTINUED | OUTPATIENT
Start: 2022-03-02 | End: 2022-03-02 | Stop reason: HOSPADM

## 2022-03-02 RX ORDER — ONDANSETRON 2 MG/ML
INJECTION INTRAMUSCULAR; INTRAVENOUS AS NEEDED
Status: DISCONTINUED | OUTPATIENT
Start: 2022-03-02 | End: 2022-03-02 | Stop reason: SURG

## 2022-03-02 RX ORDER — FAMOTIDINE 10 MG/ML
20 INJECTION, SOLUTION INTRAVENOUS ONCE
Status: DISCONTINUED | OUTPATIENT
Start: 2022-03-02 | End: 2022-03-02 | Stop reason: HOSPADM

## 2022-03-02 RX ORDER — SODIUM CHLORIDE 0.9 % (FLUSH) 0.9 %
10 SYRINGE (ML) INJECTION EVERY 12 HOURS SCHEDULED
Status: DISCONTINUED | OUTPATIENT
Start: 2022-03-02 | End: 2022-03-02 | Stop reason: HOSPADM

## 2022-03-02 RX ORDER — LEVOFLOXACIN 5 MG/ML
500 INJECTION, SOLUTION INTRAVENOUS ONCE
Status: COMPLETED | OUTPATIENT
Start: 2022-03-02 | End: 2022-03-02

## 2022-03-02 RX ORDER — PROMETHAZINE HYDROCHLORIDE 25 MG/1
25 SUPPOSITORY RECTAL ONCE AS NEEDED
Status: DISCONTINUED | OUTPATIENT
Start: 2022-03-02 | End: 2022-03-02 | Stop reason: HOSPADM

## 2022-03-02 RX ORDER — SODIUM CHLORIDE, SODIUM LACTATE, POTASSIUM CHLORIDE, CALCIUM CHLORIDE 600; 310; 30; 20 MG/100ML; MG/100ML; MG/100ML; MG/100ML
9 INJECTION, SOLUTION INTRAVENOUS CONTINUOUS
Status: DISCONTINUED | OUTPATIENT
Start: 2022-03-02 | End: 2022-03-02 | Stop reason: HOSPADM

## 2022-03-02 RX ORDER — HYDROCODONE BITARTRATE AND ACETAMINOPHEN 10; 325 MG/1; MG/1
TABLET ORAL
Status: COMPLETED
Start: 2022-03-02 | End: 2022-03-02

## 2022-03-02 RX ORDER — DROPERIDOL 2.5 MG/ML
0.62 INJECTION, SOLUTION INTRAMUSCULAR; INTRAVENOUS ONCE AS NEEDED
Status: DISCONTINUED | OUTPATIENT
Start: 2022-03-02 | End: 2022-03-02 | Stop reason: HOSPADM

## 2022-03-02 RX ORDER — HYDROCODONE BITARTRATE AND ACETAMINOPHEN 10; 325 MG/1; MG/1
1 TABLET ORAL ONCE AS NEEDED
Status: COMPLETED | OUTPATIENT
Start: 2022-03-02 | End: 2022-03-02

## 2022-03-02 RX ORDER — SODIUM CHLORIDE 0.9 % (FLUSH) 0.9 %
3 SYRINGE (ML) INJECTION EVERY 12 HOURS SCHEDULED
Status: DISCONTINUED | OUTPATIENT
Start: 2022-03-02 | End: 2022-03-02 | Stop reason: HOSPADM

## 2022-03-02 RX ORDER — PROPOFOL 10 MG/ML
VIAL (ML) INTRAVENOUS AS NEEDED
Status: DISCONTINUED | OUTPATIENT
Start: 2022-03-02 | End: 2022-03-02 | Stop reason: SURG

## 2022-03-02 RX ADMIN — SODIUM CHLORIDE: 9 INJECTION, SOLUTION INTRAVENOUS at 14:02

## 2022-03-02 RX ADMIN — ONDANSETRON 4 MG: 2 INJECTION INTRAMUSCULAR; INTRAVENOUS at 14:10

## 2022-03-02 RX ADMIN — FENTANYL CITRATE 100 MCG: 50 INJECTION, SOLUTION INTRAMUSCULAR; INTRAVENOUS at 14:06

## 2022-03-02 RX ADMIN — HYDROCODONE BITARTRATE AND ACETAMINOPHEN 1 TABLET: 10; 325 TABLET ORAL at 16:56

## 2022-03-02 RX ADMIN — LIDOCAINE HYDROCHLORIDE 0.5 ML: 10 INJECTION, SOLUTION EPIDURAL; INFILTRATION; INTRACAUDAL; PERINEURAL at 11:43

## 2022-03-02 RX ADMIN — DROPERIDOL 0.62 MG: 2.5 INJECTION, SOLUTION INTRAMUSCULAR; INTRAVENOUS at 16:17

## 2022-03-02 RX ADMIN — PROPOFOL 150 MG: 10 INJECTION, EMULSION INTRAVENOUS at 14:06

## 2022-03-02 RX ADMIN — FENTANYL CITRATE 50 MCG: 50 INJECTION, SOLUTION INTRAMUSCULAR; INTRAVENOUS at 16:07

## 2022-03-02 RX ADMIN — FAMOTIDINE 20 MG: 20 TABLET ORAL at 11:43

## 2022-03-02 RX ADMIN — FENTANYL CITRATE 100 MCG: 50 INJECTION, SOLUTION INTRAMUSCULAR; INTRAVENOUS at 14:46

## 2022-03-02 RX ADMIN — DEXAMETHASONE SODIUM PHOSPHATE 4 MG: 4 INJECTION, SOLUTION INTRA-ARTICULAR; INTRALESIONAL; INTRAMUSCULAR; INTRAVENOUS; SOFT TISSUE at 14:10

## 2022-03-02 RX ADMIN — FENTANYL CITRATE 50 MCG: 50 INJECTION, SOLUTION INTRAMUSCULAR; INTRAVENOUS at 15:32

## 2022-03-02 RX ADMIN — LIDOCAINE HYDROCHLORIDE 50 MG: 10 INJECTION, SOLUTION EPIDURAL; INFILTRATION; INTRACAUDAL; PERINEURAL at 14:06

## 2022-03-02 RX ADMIN — LEVOFLOXACIN 500 MG: 5 INJECTION, SOLUTION INTRAVENOUS at 14:05

## 2022-03-02 NOTE — ANESTHESIA PROCEDURE NOTES
Airway  Urgency: elective    Date/Time: 3/2/2022 2:07 PM    General Information and Staff    Patient location during procedure: OR  CRNA: Seven Rodriguez III, CRNA    Indications and Patient Condition  Indications for airway management: airway protection    Preoxygenated: yes  MILS not maintained throughout  Mask difficulty assessment: 0 - not attempted    Final Airway Details  Final airway type: supraglottic airway      Successful airway: I-gel  Size 4    Number of attempts at approach: 1  Assessment: lips, teeth, and gum same as pre-op    Additional Comments  Symmetric chest rise and fall with equal breath sounds bilaterally.

## 2022-03-02 NOTE — ANESTHESIA PREPROCEDURE EVALUATION
Anesthesia Evaluation                  Airway   Mallampati: I  TM distance: >3 FB  Neck ROM: full  No difficulty expected  Dental      Pulmonary    (+) sleep apnea,   Cardiovascular     ECG reviewed    (+) pacemaker ICD, pacemaker, hypertension, past MI , CAD, cardiac stents dysrhythmias, hyperlipidemia,     ROS comment: Good ef    Neuro/Psych  GI/Hepatic/Renal/Endo    (+) obesity,   liver disease, renal disease, diabetes mellitus,     Musculoskeletal     Abdominal    Substance History      OB/GYN          Other                        Anesthesia Plan    ASA 4     general     intravenous induction     Anesthetic plan, all risks, benefits, and alternatives have been provided, discussed and informed consent has been obtained with: patient.    Plan discussed with CRNA.        CODE STATUS:

## 2022-03-02 NOTE — INTERVAL H&P NOTE
[facility] Pre-op    Full history and physical note from office is up to date.       LAB Results:  Lab Results   Component Value Date    WBC 4.84 02/24/2021    HGB 16.6 02/24/2021    HCT 48.5 02/24/2021    MCV 87.2 02/24/2021     02/24/2021    NEUTROABS 3.48 02/24/2021    GLUCOSE 155 (H) 02/24/2021    BUN 21 02/24/2021    CREATININE 1.23 02/24/2021    EGFRIFNONA 60 (L) 02/24/2021    EGFRIFAFRI 72 02/24/2021     02/24/2021    K 4.5 02/24/2021     02/24/2021    CO2 23.2 02/24/2021    CALCIUM 10.1 02/24/2021    ALBUMIN 4.70 02/24/2021    AST 43 (H) 02/24/2021    ALT 58 (H) 02/24/2021    BILITOT 0.4 02/24/2021     *I have reviewed the patient's recent clinical results.    Cancer Staging (if applicable)  Cancer Patient: __ yes _X_no __unknown__N/A; If yes, clinical stage T:__ N:__M:__, stage group or __N/A    Assessment: Benign prostatic hypertrophy    Plan: Greenlight vaporization of prostate      Nickie Jett, APRN   03/02/22   11:00 AM EST

## 2022-03-02 NOTE — DISCHARGE INSTRUCTIONS
You can take an over the counter stool softener to prevent nausea.    You can transition from narcotic to plain tylenol for pain as long as you don't take more than 4 grams (4,000mg) tylenol from ALL sources in 24 hour period.

## 2022-03-02 NOTE — OP NOTE
Preop diagnosis: BPH with obstruction    Postop diagnosis: Same    Procedure: Greenlight vaporization of the prostate    After induction of general anesthesia patient was prepped and draped in the dorsolithotomy position.  Cystoscope was inserted under direct vision.  Urethra was normal.  Prostate showed anatomical obstruction from trilobar hyperplasia.  Significant median lobe component was present.  The bladder mucosa was normal.  Ureteral orifices were normal position configuration.  Laser fiber was inserted.  The median lobe component was vaporized first.  Following this a circumferential vaporized station from the bladder neck to the verumontanum.  The end of the procedure an open channel was present.  The cystoscope was removed.  A #20 Villalpando catheter was inserted.  Patient taught procedure well at the operating room satisfactory condition

## 2022-03-02 NOTE — ANESTHESIA POSTPROCEDURE EVALUATION
Patient: Omar Can    Procedure Summary     Date: 03/02/22 Room / Location:  [facility]    Anesthesia Start: 1402 Anesthesia Stop: 1508    Procedure: GREENLIGHT LASER VAPORIZATION OF PROSTATE (N/A ) Diagnosis:     Surgeons: Todd Ponce MD Provider: Salas Moyer MD    Anesthesia Type: general ASA Status: 4          Anesthesia Type: general    Vitals  No vitals data found for the desired time range.          Post Anesthesia Care and Evaluation    Patient location during evaluation: PACU  Patient participation: complete - patient participated  Level of consciousness: awake and alert  Pain management: adequate  Airway patency: patent  Anesthetic complications: No anesthetic complications  PONV Status: none  Cardiovascular status: hemodynamically stable and acceptable  Respiratory status: nonlabored ventilation, acceptable and nasal cannula  Hydration status: acceptable

## 2022-05-03 DIAGNOSIS — Z76.0 MEDICATION REFILL: ICD-10-CM

## 2022-05-03 DIAGNOSIS — E78.5 HYPERLIPIDEMIA, UNSPECIFIED HYPERLIPIDEMIA TYPE: ICD-10-CM

## 2022-05-03 DIAGNOSIS — E11.9 TYPE 2 DIABETES MELLITUS TREATED WITHOUT INSULIN: ICD-10-CM

## 2022-05-03 RX ORDER — ATORVASTATIN CALCIUM 40 MG/1
40 TABLET, FILM COATED ORAL NIGHTLY
Qty: 90 TABLET | Refills: 0 | Status: SHIPPED | OUTPATIENT
Start: 2022-05-03 | End: 2022-08-02

## 2022-05-03 RX ORDER — GLIMEPIRIDE 4 MG/1
TABLET ORAL
Qty: 180 TABLET | Refills: 0 | Status: SHIPPED | OUTPATIENT
Start: 2022-05-03 | End: 2022-08-02

## 2022-05-03 NOTE — TELEPHONE ENCOUNTER
Last Office Visit: 02/24/22  Next Office Visit:N/A  Last Refill Date:08/16/21  Quantity:180  Refills:2

## 2022-05-03 NOTE — TELEPHONE ENCOUNTER
Last Office Visit: 2/24/22  Next Office Visit:N/A  Last Refill Date:8/16/21  Quantity:90  Refills:2

## 2022-05-25 ENCOUNTER — OFFICE VISIT (OUTPATIENT)
Dept: FAMILY MEDICINE CLINIC | Facility: CLINIC | Age: 62
End: 2022-05-25

## 2022-05-25 VITALS
WEIGHT: 252 LBS | HEIGHT: 73 IN | BODY MASS INDEX: 33.4 KG/M2 | SYSTOLIC BLOOD PRESSURE: 142 MMHG | RESPIRATION RATE: 20 BRPM | HEART RATE: 81 BPM | OXYGEN SATURATION: 97 % | DIASTOLIC BLOOD PRESSURE: 82 MMHG | TEMPERATURE: 97 F

## 2022-05-25 DIAGNOSIS — G47.33 OBSTRUCTIVE SLEEP APNEA SYNDROME: ICD-10-CM

## 2022-05-25 DIAGNOSIS — I10 PRIMARY HYPERTENSION: Chronic | ICD-10-CM

## 2022-05-25 DIAGNOSIS — I25.10 CORONARY ARTERY DISEASE INVOLVING NATIVE HEART WITHOUT ANGINA PECTORIS, UNSPECIFIED VESSEL OR LESION TYPE: ICD-10-CM

## 2022-05-25 DIAGNOSIS — E11.9 TYPE 2 DIABETES MELLITUS WITHOUT COMPLICATION, WITHOUT LONG-TERM CURRENT USE OF INSULIN: Primary | ICD-10-CM

## 2022-05-25 DIAGNOSIS — Z95.810 BIVENTRICULAR IMPLANTABLE CARDIOVERTER-DEFIBRILLATOR (ICD) IN SITU: ICD-10-CM

## 2022-05-25 DIAGNOSIS — B35.1 ONYCHOMYCOSIS: ICD-10-CM

## 2022-05-25 DIAGNOSIS — I44.0 ATRIOVENTRICULAR BLOCK, FIRST DEGREE: ICD-10-CM

## 2022-05-25 LAB
EXPIRATION DATE: NORMAL
HBA1C MFR BLD: 6.4 %
Lab: NORMAL

## 2022-05-25 PROCEDURE — 3044F HG A1C LEVEL LT 7.0%: CPT | Performed by: NURSE PRACTITIONER

## 2022-05-25 PROCEDURE — 99214 OFFICE O/P EST MOD 30 MIN: CPT | Performed by: NURSE PRACTITIONER

## 2022-05-25 PROCEDURE — 83036 HEMOGLOBIN GLYCOSYLATED A1C: CPT | Performed by: NURSE PRACTITIONER

## 2022-05-25 NOTE — PROGRESS NOTES
Follow Up Office Note     Patient Name: Omar Can  : 1960   MRN: [id number]     Chief Complaint:    Chief Complaint   Patient presents with   • Diabetes   • Hypertension       History of Present Illness: Omar Can is a 61 y.o. male who presents today for reevaluation and management of chronic type 2 diabetes mellitus.  Patient reports that his home glucose levels have been stable and controlled on current medications.  He reports medication compliance.  He denies any medication side effects or adverse reactions at this time.    Patient has chronic HTN which has been stable. His blood pressure is above goal of less than 130/80.     Patient's insurance has recently changed and his previous specialty providers are no longer in network.  Patient is requesting referral to cardiology, pulmonology/sleep medicine and podiatry. Patient has chronic CAD, CHF, HTN. Patient has PMH significant for MI and has biventricular implantable cardioverter-defibrillator. Patient has chronic CHEKO. Patient has chronic DM2 with onychomycosis.      Subjective      Review of Systems:   Review of Systems   Constitutional: Negative.    Respiratory: Negative.         CHEKO   Cardiovascular: Negative for chest pain, palpitations and leg swelling.   Gastrointestinal: Negative.    Endocrine: Negative for polydipsia, polyphagia and polyuria.   Neurological: Negative.         Past Medical History:   Past Medical History:   Diagnosis Date   • Arthritis    • Benign prostatic hyperplasia    • CHF (congestive heart failure) (HCC)    • Colon polyps    • Coronary artery disease    • Coronary stent patent     x 5 stents    • Diabetes mellitus (HCC)    • Elevated cholesterol    • Hypertension    • Myocardial infarction (HCC) 2010    cardiac arrest   • Obesity    • Prostate hypertrophy    • Sleep apnea          Medications:     Current Outpatient Medications:   •  amLODIPine (NORVASC) 10 MG tablet, Take 10 mg by mouth Daily., Disp: , Rfl:    •  aspirin 81 MG tablet, Take 81 mg by mouth Daily., Disp: , Rfl:   •  atorvastatin (LIPITOR) 40 MG tablet, TAKE 1 TABLET BY MOUTH EVERY NIGHT, Disp: 90 tablet, Rfl: 0  •  carvedilol (COREG) 12.5 MG tablet, Take 12.5 mg by mouth 2 (Two) Times a Day With Meals., Disp: , Rfl:   •  ciprofloxacin (Cipro) 500 MG tablet, Take 1 tablet by mouth 2 (Two) Times a Day., Disp: 28 tablet, Rfl: 0  •  finasteride (PROSCAR) 5 MG tablet, Take 5 mg by mouth Daily., Disp: , Rfl:   •  furosemide (LASIX) 20 MG tablet, Take  by mouth As Needed., Disp: , Rfl:   •  glimepiride (AMARYL) 4 MG tablet, TAKE 1 TABLET BY MOUTH TWO TIMES A DAY, Disp: 180 tablet, Rfl: 0  •  glucose blood (FREESTYLE LITE) test strip, USE TO CHECK BLOOD GLUCOSE UP TO THREE TIMES DAILY OR AS DIRECTED, Disp: 100 each, Rfl: 11  •  glucose monitor monitoring kit, 1 each As Needed (for diabetes)., Disp: 1 each, Rfl: 0  •  hydrALAZINE (APRESOLINE) 50 MG tablet, 50 mg Every 8 (Eight) Hours., Disp: , Rfl:   •  HYDROcodone-acetaminophen (NORCO)  MG per tablet, Take 1 tablet by mouth Every 6 (Six) Hours As Needed for Moderate Pain ., Disp: 25 tablet, Rfl: 0  •  Lancets (ONETOUCH ULTRASOFT) lancets, Check daily and prn, Disp: 100 each, Rfl: 12  •  metFORMIN (GLUCOPHAGE) 850 MG tablet, TAKE 1/2 TABLET BY MOUTH TWO TIMES A DAY, Disp: 90 tablet, Rfl: 0  •  Multiple Vitamin (MULTI-VITAMIN DAILY PO), 1 tablet Daily., Disp: , Rfl:   •  nitroglycerin (NITROSTAT) 0.3 MG SL tablet, Place 0.3 mg under the tongue Every 5 (Five) Minutes As Needed for Chest Pain. Take no more than 3 doses in 15 minutes., Disp: , Rfl:   •  ondansetron ODT (ZOFRAN-ODT) 4 MG disintegrating tablet, Place 1 tablet on the tongue Every 8 (Eight) Hours As Needed for Nausea or Vomiting., Disp: 20 tablet, Rfl: 0  •  oxybutynin XL (DITROPAN-XL) 10 MG 24 hr tablet, Take 10 mg by mouth Daily., Disp: , Rfl:   •  Ozempic, 0.25 or 0.5 MG/DOSE, 2 MG/1.5ML solution pen-injector, INJECT 0.25MG UNDER THE SKIN INTO THE  "APPROPRIATE AREA ONCE WEEKLY AS DIRECTED (Patient taking differently: Inject 0.25 mg under the skin into the appropriate area as directed 1 (One) Time Per Week. On Tuesdays), Disp: 1.5 mL, Rfl: 5  •  Potassium Gluconate 2 MEQ tablet, 2 mEq Daily., Disp: , Rfl:   •  tamsulosin (FLOMAX) 0.4 MG capsule 24 hr capsule, Take 1 capsule by mouth Daily., Disp: 90 capsule, Rfl: 2  •  valsartan (DIOVAN) 320 MG tablet, Take 320 mg by mouth every night at bedtime., Disp: , Rfl:     Allergies:   Allergies   Allergen Reactions   • Cyclobenzaprine Hives   • Flucloxacillin Hives         Objective     Physical Exam:  Vital Signs:   Vitals:    05/25/22 0805   BP: 142/82   Pulse: 81   Resp: 20   Temp: 97 °F (36.1 °C)   SpO2: 97%   Weight: 114 kg (252 lb)   Height: 185.4 cm (73\")   PainSc: 0-No pain     Body mass index is 33.25 kg/m².     Physical Exam  Vitals and nursing note reviewed.   Constitutional:       General: He is not in acute distress.     Appearance: Normal appearance. He is well-developed. He is not ill-appearing, toxic-appearing or diaphoretic.   HENT:      Head: Normocephalic and atraumatic.   Cardiovascular:      Rate and Rhythm: Normal rate and regular rhythm.      Heart sounds: Normal heart sounds, S1 normal and S2 normal. No murmur heard.  Pulmonary:      Effort: Pulmonary effort is normal. No respiratory distress.      Breath sounds: Normal breath sounds. No stridor. No wheezing.   Musculoskeletal:      Right lower leg: No edema.      Left lower leg: No edema.   Skin:     General: Skin is warm and dry.   Neurological:      General: No focal deficit present.      Mental Status: He is alert and oriented to person, place, and time.   Psychiatric:         Mood and Affect: Mood normal.         Behavior: Behavior normal. Behavior is cooperative.         Thought Content: Thought content normal.         Judgment: Judgment normal.         Assessment / Plan      Assessment/Plan:   Diagnoses and all orders for this " visit:    1. Type 2 diabetes mellitus without complication, without long-term current use of insulin (HCC) (Primary)  Assessment & Plan:  Diabetes is improving with treatment.   Continue current treatment regimen.  Dietary recommendations for ADA diet.  Diabetes will be reassessed in 3 months.    Orders:  -     Ambulatory Referral to Podiatry  -     POC Glycosylated Hemoglobin (Hb A1C)    2. Primary hypertension  Assessment & Plan:  Hypertension is stable, but above goal of less than 130/80..  Continue current treatment regimen.  Dietary sodium restriction.  Weight loss.  Regular aerobic exercise.  Continue current medications.  Ambulatory blood pressure monitoring.  Blood pressure will be reassessed at the next regular appointment.    Orders:  -     Ambulatory Referral to Cardiology    3. Coronary artery disease involving native heart without angina pectoris, unspecified vessel or lesion type  -     Ambulatory Referral to Cardiology    4. Biventricular implantable cardioverter-defibrillator (ICD) in situ  -     Ambulatory Referral to Cardiology    5. Atrioventricular block, first degree  -     Ambulatory Referral to Cardiology    6. Obstructive sleep apnea syndrome  -     Ambulatory Referral to Sleep Medicine    7. Onychomycosis  -     Ambulatory Referral to Podiatry        Lab 05/25/22  0833   HEMOGLOBIN A1C 6.4     Follow Up:   PRN and at next scheduled appointment(s) with PCP.    Discussed the nature of the medical condition(s) risks, complications, implications, management, safe and proper use of medications. Encouraged medication compliance, and keeping scheduled follow up appointments with me and any other providers.      RTC if symptoms fail to improve, to ER if symptoms worsen.      BRIDGET Carter  [facility] [place]

## 2022-06-24 DIAGNOSIS — Z76.0 MEDICATION REFILL: ICD-10-CM

## 2022-06-24 DIAGNOSIS — E11.9 TYPE 2 DIABETES MELLITUS TREATED WITHOUT INSULIN: ICD-10-CM

## 2022-06-24 NOTE — TELEPHONE ENCOUNTER
Caller: [id number] - [place], [place] - [address] [facility] [address] - [phone]  - [phone] FX    Relationship: Pharmacy    Best call back number: [phone]    Requested Prescriptions:   Requested Prescriptions     Pending Prescriptions Disp Refills   • glucose blood (FREESTYLE LITE) test strip 100 each 11     Sig: USE TO CHECK BLOOD GLUCOSE UP TO THREE TIMES DAILY OR AS DIRECTED        Pharmacy where request should be sent: [id number] - [place], [place] - [address] [facility] [address] - [phone]  - [phone] FX     Additional details provided by patient: PHARMACY CALLED TO SEND THIS BACK IN STRIPS OR 1 TOUCH BARRIER DUE TO INSURANCE. PROBABLY NEED A WHOLE NEW SYSTEM.     Does the patient have less than a 3 day supply:  [] Yes  [x] No    Miriam Ramirez Rep   06/24/22 13:46 EDT

## 2022-06-28 RX ORDER — BLOOD-GLUCOSE METER
KIT MISCELLANEOUS
Qty: 100 EACH | Refills: 11 | Status: SHIPPED | OUTPATIENT
Start: 2022-06-28 | End: 2022-10-20

## 2022-06-30 NOTE — ASSESSMENT & PLAN NOTE
Diabetes is worsening.  A1c today is 7.5  Continue current treatment regimen.  Dietary recommendations for ADA diet.  Regular aerobic exercise.  Diabetes will be reassessed in 3 months.  Weight loss.   [[id number]]

## 2022-07-05 DIAGNOSIS — E11.9 TYPE 2 DIABETES MELLITUS WITHOUT COMPLICATION, WITHOUT LONG-TERM CURRENT USE OF INSULIN: Primary | ICD-10-CM

## 2022-07-05 RX ORDER — BLOOD-GLUCOSE METER
1 EACH MISCELLANEOUS ONCE
Qty: 1 KIT | Refills: 0 | Status: SHIPPED | OUTPATIENT
Start: 2022-07-05 | End: 2022-07-05

## 2022-07-12 ENCOUNTER — OFFICE VISIT (OUTPATIENT)
Dept: CARDIOLOGY | Facility: CLINIC | Age: 62
End: 2022-07-12

## 2022-07-12 VITALS
HEIGHT: 73 IN | WEIGHT: 256.2 LBS | BODY MASS INDEX: 33.95 KG/M2 | SYSTOLIC BLOOD PRESSURE: 140 MMHG | OXYGEN SATURATION: 96 % | HEART RATE: 79 BPM | DIASTOLIC BLOOD PRESSURE: 82 MMHG

## 2022-07-12 DIAGNOSIS — G47.33 OBSTRUCTIVE SLEEP APNEA SYNDROME: Chronic | ICD-10-CM

## 2022-07-12 DIAGNOSIS — I44.0 ATRIOVENTRICULAR BLOCK, FIRST DEGREE: ICD-10-CM

## 2022-07-12 DIAGNOSIS — Z95.810 BIVENTRICULAR IMPLANTABLE CARDIOVERTER-DEFIBRILLATOR (ICD) IN SITU: Primary | Chronic | ICD-10-CM

## 2022-07-12 DIAGNOSIS — I10 PRIMARY HYPERTENSION: Chronic | ICD-10-CM

## 2022-07-12 PROCEDURE — 93000 ELECTROCARDIOGRAM COMPLETE: CPT | Performed by: PHYSICIAN ASSISTANT

## 2022-07-12 PROCEDURE — 99204 OFFICE O/P NEW MOD 45 MIN: CPT | Performed by: STUDENT IN AN ORGANIZED HEALTH CARE EDUCATION/TRAINING PROGRAM

## 2022-07-12 RX ORDER — UBIDECARENONE 100 MG
100 CAPSULE ORAL DAILY
COMMUNITY

## 2022-07-12 RX ORDER — CARVEDILOL 25 MG/1
25 TABLET ORAL 2 TIMES DAILY WITH MEALS
COMMUNITY

## 2022-07-12 RX ORDER — NITROGLYCERIN 0.3 MG/1
0.3 TABLET SUBLINGUAL
Qty: 30 TABLET | Refills: 3 | Status: SHIPPED | OUTPATIENT
Start: 2022-07-12 | End: 2023-01-17 | Stop reason: SDUPTHER

## 2022-07-12 NOTE — PROGRESS NOTES
[facility]  INITIAL OFFICE CONSULT      Omar Can  1960  PCP: Diana Pardo APRN    SUBJECTIVE:   Omar Can is a 62 y.o. male seen for a consultation visit regarding the following:     Chief Complaint:   Chief Complaint   Patient presents with   • Coronary Artery Disease   • Atrioventricular block, first degree          Consultation is requested by BRIDGET Carter for evaluation of Coronary Artery Disease and Atrioventricular block, first degree        History:  61-year-old male originally  from [place] presents today for initial consultation regarding ICM, CHF, MDT BIVICD. He states his insurance would now allow him to continue to see Rogelio with [facility]. He is here to establish care regarding the above. He had a Cardiac Arrest in 2010 with multiple Stents. He eventually had an MDT BIVICD. Since then he has been doing well. He denies any chest pain, worsening sob, palpitations, or ICD shocks. He is tolerating his medications well. He states his BP , DM well controlled. He exercising at [facility] and is doing well with this.       Cardiac PMH: (Old records have been reviewed and summarized below)  1. Ischemic heart disease  a. Cardiac arrest admission to [facility] March 26, 2010 with left heart catheterization by Dr. MIKE with emergent catheter-based intervention.  (Database insufficiency)  b. Repeat left heart catheterization 48 hours with catheter-based invention 2 stents other vessels (database insufficiency)  c. MDT BIVICD placed 2010 [facility] (database insufficiency ) Dr. Junaid andrade. Medtronic BIVIICD generator change November 7, 2019  e. Echocardiogram May 20, 2021 LVH EF 60% mild AI  f. Normal myocardial perfusion study May 20, 2021 EF calculated 42%.  2. Hypertension  3. Hyperlipidemia  4. BPH, recent cystoscope Dr. Ponce  5. Diabetes mellitus type 2  6. Obstructive sleep apnea      Past Medical  History, Past Surgical History, Family history, Social History, and Medications were all reviewed with the patient today and updated as necessary.     Current Outpatient Medications   Medication Sig Dispense Refill   • amLODIPine (NORVASC) 10 MG tablet Take 10 mg by mouth Daily.     • aspirin 81 MG tablet Take 81 mg by mouth Daily.     • atorvastatin (LIPITOR) 40 MG tablet TAKE 1 TABLET BY MOUTH EVERY NIGHT 90 tablet 0   • carvedilol (COREG) 25 MG tablet Take 25 mg by mouth 2 (Two) Times a Day With Meals.     • coenzyme Q10 100 MG capsule Take 100 mg by mouth Daily.     • finasteride (PROSCAR) 5 MG tablet Take 5 mg by mouth Daily.     • furosemide (LASIX) 20 MG tablet Take 20 mg by mouth As Needed.     • glimepiride (AMARYL) 4 MG tablet TAKE 1 TABLET BY MOUTH TWO TIMES A  tablet 0   • glucose blood (FREESTYLE LITE) test strip USE TO CHECK BLOOD GLUCOSE UP TO THREE TIMES DAILY OR AS DIRECTED 100 each 11   • glucose monitor monitoring kit 1 each As Needed (for diabetes). 1 each 0   • hydrALAZINE (APRESOLINE) 50 MG tablet Take 50 mg by mouth Every 8 (Eight) Hours.     • Lancets (ONETOUCH ULTRASOFT) lancets Check daily and prn 100 each 12   • metFORMIN (GLUCOPHAGE) 850 MG tablet TAKE 1/2 TABLET BY MOUTH TWO TIMES A DAY 90 tablet 0   • Multiple Vitamin (MULTI-VITAMIN DAILY PO) Take 1 tablet by mouth Daily.     • nitroglycerin (NITROSTAT) 0.3 MG SL tablet Place 0.3 mg under the tongue Every 5 (Five) Minutes As Needed for Chest Pain. Take no more than 3 doses in 15 minutes.     • ondansetron ODT (ZOFRAN-ODT) 4 MG disintegrating tablet Place 1 tablet on the tongue Every 8 (Eight) Hours As Needed for Nausea or Vomiting. 20 tablet 0   • oxybutynin XL (DITROPAN-XL) 10 MG 24 hr tablet Take 10 mg by mouth Daily.     • Ozempic, 0.25 or 0.5 MG/DOSE, 2 MG/1.5ML solution pen-injector INJECT 0.25MG UNDER THE SKIN INTO THE APPROPRIATE AREA ONCE WEEKLY AS DIRECTED (Patient taking differently: Inject 0.25 mg under the skin into  the appropriate area as directed 1 (One) Time Per Week. On Tuesdays) 1.5 mL 5   • Potassium Gluconate 2 MEQ tablet Take 2 mEq by mouth Daily.     • tamsulosin (FLOMAX) 0.4 MG capsule 24 hr capsule Take 1 capsule by mouth Daily. 90 capsule 2   • valsartan (DIOVAN) 320 MG tablet Take 320 mg by mouth every night at bedtime.       No current facility-administered medications for this visit.     Allergies   Allergen Reactions   • Cyclobenzaprine Hives   • Flucloxacillin Hives         Past Medical History:   Diagnosis Date   • Arthritis    • Benign prostatic hyperplasia    • CHF (congestive heart failure) ([facility])    • Colon polyps    • Coronary artery disease    • Coronary stent patent     x 5 stents    • Diabetes mellitus ([facility])    • Elevated cholesterol    • Hypertension    • Myocardial infarction ([facility]) 2010    cardiac arrest   • Obesity    • Prostate hypertrophy    • Sleep apnea      Past Surgical History:   Procedure Laterality Date   • CARDIAC CATHETERIZATION     • CARDIAC DEFIBRILLATOR PLACEMENT  03/2010    Implanted after cardiac arrest per patient   • CORONARY STENT PLACEMENT     • CYSTOSCOPY     • CYSTOSCOPY TRANSURETHRAL RESECTION OF PROSTATE N/A 3/2/2022    Procedure: GREENLIGHT LASER VAPORIZATION OF PROSTATE;  Surgeon: Todd Ponce MD;  Location: [facility];  Service: Urology;  Laterality: N/A;   • KNEE ARTHROSCOPY Right    • OTHER SURGICAL HISTORY      defibulator with pacemaker --Medtronic, Cardiologist Dr. Сергей Lees [facility]   • PACEMAKER IMPLANTATION  05/29/2019    battery change per patient   • REPLACEMENT TOTAL KNEE Left 11/17/2018   • VESICOSTOMY     • WISDOM TOOTH EXTRACTION      3 out 4 removed.      Family History   Problem Relation Age of Onset   • Stroke Mother    • Diabetes Mother    • Heart disease Mother    • Diabetes Father    • Heart disease Father    • No Known Problems Sister    • Cerebral palsy Brother    • Cancer Sister         [place]arrow.    • No Known Problems Daughter    •  "Diabetes Son      Social History     Tobacco Use   • Smoking status: Never Smoker   • Smokeless tobacco: Never Used   Substance Use Topics   • Alcohol use: Never       ROS:  Review of Symptoms:  General: no recent weight loss/gain, weakness or fatigue  Skin: no rashes, lumps, or other skin changes  HEENT: no dizziness, lightheadedness, or vision changes  Respiratory: no cough or hemoptysis  Cardiovascular: no palpitations, and tachycardia  Gastrointestinal: no black/tarry stools or diarrhea  Urinary: no change in frequency or urgency  Peripheral Vascular: no claudication or leg cramps  Musculoskeletal: OA  Psychiatric: no depression or excessive stress  Neurological: no sensory or motor loss, no syncope  Hematologic: no anemia, easy bruising or bleeding  Endocrine: no thyroid problems, nor heat or cold intolerance         PHYSICAL EXAM:   Ht 185.4 cm (73\")   Wt 116 kg (256 lb 3.2 oz)   BMI 33.80 kg/m²      Wt Readings from Last 5 Encounters:   07/12/22 116 kg (256 lb 3.2 oz)   05/25/22 114 kg (252 lb)   03/02/22 117 kg (259 lb)   02/24/22 117 kg (257 lb)   07/23/21 116 kg (256 lb)     BP Readings from Last 5 Encounters:   05/25/22 142/82   03/02/22 136/85   02/24/22 132/80   07/23/21 130/80   02/24/21 164/100       General-Well Nourished, Well developed  Eyes - PERRLA  Neck- supple, No mass  CV- regular rate and rhythm, no MRG  Lung- clear bilaterally  Abd- soft, +BS  Musc/skel - Norm strength and range of motion  Skin- warm and dry  Neuro - Alert & Oriented x 3, appropriate mood.    Patient's external notes were reviewed.  Independent interpretation of test performed by another physician in facility were reviewed.  Outside laboratory data was also reviewed.    Medical problems and test results were reviewed with the patient today.     Results for orders placed or performed in visit on 05/25/22   POC Glycosylated Hemoglobin (Hb A1C)    Specimen: Blood   Result Value Ref Range    Hemoglobin A1C 6.4 %    Lot Number " [id number]     Expiration Date 8/1/22          Lab Results   Component Value Date    CHOL 172 06/27/2020    HDL 33 (L) 02/24/2021    HDL 33 (L) 06/27/2020    LDL 86 02/24/2021    VLDL 29 02/24/2021     Medtronic device interrogation: Bivicd.  A paced 90%.  BiV paced 99%.  Normal thresholds impedance.  Battery voltage 3.9 years.  Charge time 8 seconds.  Patient is complete heart block.  No events since last interrogation.  EKG:  (EKG/Tracing has been independently visualized by me and summarized below)      ECG 12 Lead    Date/Time: 7/12/2022 1:38 PM  Performed by: Eder Magana PA  Authorized by: Eder Magana PA   Rhythm: sinus rhythm and paced  Rate: normal  Conduction: conduction normal  ST Segments: ST segments normal  T Waves: T waves normal  QRS axis: normal    Clinical impression: abnormal EKG            ASSESSMENT   1.  Ischemic heart disease: Previous cardiac arrest, acute MI with multiple catheter-based interventions [facility] 2010.  Most recent stress test 2021 revealing new onset of ischemia EF estimated to be normal 60%.  2.  MedtronicBIV ICD: Placed at the time of arrest 2010 generator change 2019 Dr. Mercado [facility].  Interrogation today stable.  3.  Hypertension: Controlled current medical therapy, carvedilol hydralazine amlodipine and Diovan  4.  Hyperlipidemia: Statin therapy      PLAN  Patient is overall doing very well.  He is reestablishing care here after being followed at the [facility].  His Medtronic BiV ICD is functioning very well.  He does have approximately 1 year battery life on this and we will need to continue to monitor.  It seems him in the past that his SVC coil may have been deactivated due to malfunction but is functioning fine now and was reactivated previously.  We will leave the settings as they are for now.  He is currently well compensated from a heart failure standpoint we will not make any changes to his medical regimen at the  current time.  I will go ahead and refill his nitroglycerin as he is about to run out.

## 2022-08-01 DIAGNOSIS — Z76.0 MEDICATION REFILL: ICD-10-CM

## 2022-08-01 DIAGNOSIS — E11.9 TYPE 2 DIABETES MELLITUS TREATED WITHOUT INSULIN: ICD-10-CM

## 2022-08-01 DIAGNOSIS — E78.5 HYPERLIPIDEMIA, UNSPECIFIED HYPERLIPIDEMIA TYPE: ICD-10-CM

## 2022-08-01 NOTE — TELEPHONE ENCOUNTER
Rx Refill Note  Requested Prescriptions     Pending Prescriptions Disp Refills   • atorvastatin (LIPITOR) 40 MG tablet [Pharmacy Med Name: Atorvastatin Calcium Oral Tablet 40 MG] 90 tablet 0     Sig: TAKE 1 TABLET BY MOUTH EVERY NIGHT   • glimepiride (AMARYL) 4 MG tablet [Pharmacy Med Name: Glimepiride Oral Tablet 4 MG] 180 tablet 0     Sig: TAKE 1 TABLET BY MOUTH TWO TIMES A DAY   • metFORMIN (GLUCOPHAGE) 850 MG tablet [Pharmacy Med Name: metFORMIN HCl Oral Tablet 850 MG] 90 tablet 0     Sig: TAKE 1/2 TABLET BY MOUTH TWO TIMES A DAY      Last office visit with prescribing clinician: 5/25/2022      Next office visit with prescribing clinician: 8/25/2022          {TIP  Is Refill Pharmacy correct?:23}  Cullen Chaudhry MA  08/01/22, 13:45 EDT

## 2022-08-02 RX ORDER — ATORVASTATIN CALCIUM 40 MG/1
40 TABLET, FILM COATED ORAL NIGHTLY
Qty: 90 TABLET | Refills: 0 | Status: SHIPPED | OUTPATIENT
Start: 2022-08-02 | End: 2022-11-07 | Stop reason: SDUPTHER

## 2022-08-02 RX ORDER — GLIMEPIRIDE 4 MG/1
TABLET ORAL
Qty: 180 TABLET | Refills: 0 | Status: SHIPPED | OUTPATIENT
Start: 2022-08-02 | End: 2022-11-09 | Stop reason: SDUPTHER

## 2022-08-03 ENCOUNTER — OFFICE VISIT (OUTPATIENT)
Dept: SLEEP MEDICINE | Facility: HOSPITAL | Age: 62
End: 2022-08-03

## 2022-08-03 VITALS
WEIGHT: 254 LBS | BODY MASS INDEX: 33.66 KG/M2 | HEART RATE: 75 BPM | DIASTOLIC BLOOD PRESSURE: 73 MMHG | SYSTOLIC BLOOD PRESSURE: 137 MMHG | HEIGHT: 73 IN | OXYGEN SATURATION: 93 %

## 2022-08-03 DIAGNOSIS — G47.33 OBSTRUCTIVE SLEEP APNEA, ADULT: Primary | ICD-10-CM

## 2022-08-03 PROCEDURE — 99213 OFFICE O/P EST LOW 20 MIN: CPT | Performed by: NURSE PRACTITIONER

## 2022-08-03 NOTE — PROGRESS NOTES
Chief Complaint  Sleeping Problem    Subjective     History of Present Illness:  Omar Can is a 62 y.o. male with a history of HTN, HL, ischemic heart disease s/p catheterization/CABG, and diabetes mellitus.  Patient has a diagnosis of obstructive sleep apnea and has been on CPAP since 2018.  He was originally evaluated in 2018 at [facility].  He denies use of tobacco, drinks less than monthly, and denies use of illicit drugs.  The patient drinks 2 cups of regular coffee, and 1 regular glass of tea daily.  Patient comes with his compliance report.  Patient does use his device 30/30 days, and 100% greater than 4 hours. He will sleep between 6-8 hours. He wakes 1-2 times per night.    Further details are as follows:    [place] Scale is: 6/24    Estimated average amount of sleep per night: 7  Number of times he wakes up at night: 2  Difficulty falling back asleep: yes  It usually takes 10 minutes to go to sleep.  He feels sleepy upon waking up: yes  Rotating or night shift work: no    Drowsiness/Sleepiness:  He exhibits the following:  None    Snoring/Breathing:  He exhibits the following:  awakens with dry mouth    Head Injury:  He exhibits the following:  No    Reflux:  He describes the following:  None    RLS/PLMs:  He describes the following:  none    Weight: Previous 300 lb  Weight change in the last year:  loss: 40 lbs    The patient's relevant past medical, surgical, family, and social history reviewed and updated in Epic as appropriate.    Review of Systems    PMH:    Past Medical History:   Diagnosis Date   • Arthritis    • Benign prostatic hyperplasia    • CHF (congestive heart failure) ([facility])    • Colon polyps    • Coronary artery disease    • Coronary stent patent     x 5 stents    • Diabetes mellitus (H[facility])    • Elevated cholesterol    • Hypertension    • Myocardial infarction ([facility]) 2010    cardiac arrest   • Obesity    • Prostate hypertrophy    • Sleep apnea      Past Surgical History:    Procedure Laterality Date   • CARDIAC CATHETERIZATION     • CARDIAC DEFIBRILLATOR PLACEMENT  03/2010    Implanted after cardiac arrest per patient   • CORONARY ARTERY BYPASS GRAFT     • CORONARY STENT PLACEMENT     • CYSTOSCOPY     • CYSTOSCOPY TRANSURETHRAL RESECTION OF PROSTATE N/A 03/02/2022    Procedure: GREENLIGHT LASER VAPORIZATION OF PROSTATE;  Surgeon: Todd Ponce MD;  Location: [facility];  Service: Urology;  Laterality: N/A;   • KNEE ARTHROSCOPY Right    • OTHER SURGICAL HISTORY      defibulator with pacemaker --Medtronic, Cardiologist Dr. Сергей Lees [facility]   • PACEMAKER IMPLANTATION  05/29/2019    battery change per patient   • REPLACEMENT TOTAL KNEE Left 11/17/2018   • VESICOSTOMY     • WISDOM TOOTH EXTRACTION      3 out 4 removed.        Allergies   Allergen Reactions   • Cyclobenzaprine Hives   • Flucloxacillin Hives       MEDS:  Prior to Admission medications    Medication Sig Start Date End Date Taking? Authorizing Provider   amLODIPine (NORVASC) 10 MG tablet Take 10 mg by mouth Daily. 2/1/22   Talat Solis MD   aspirin 81 MG tablet Take 81 mg by mouth Daily.    Talat Solis MD   atorvastatin (LIPITOR) 40 MG tablet TAKE 1 TABLET BY MOUTH EVERY NIGHT 8/2/22   Diana Pardo APRN   carvedilol (COREG) 25 MG tablet Take 25 mg by mouth 2 (Two) Times a Day With Meals.    Talat Solis MD   coenzyme Q10 100 MG capsule Take 100 mg by mouth Daily.    Talat Solis MD   finasteride (PROSCAR) 5 MG tablet Take 5 mg by mouth Daily. 7/7/21   Talat Solis MD   furosemide (LASIX) 20 MG tablet Take 20 mg by mouth As Needed. 12/16/20   Talat Solis MD   glimepiride (AMARYL) 4 MG tablet TAKE 1 TABLET BY MOUTH TWO TIMES A DAY 8/2/22   Diana Pardo APRN   glucose blood (FREESTYLE LITE) test strip USE TO CHECK BLOOD GLUCOSE UP TO THREE TIMES DAILY OR AS DIRECTED 6/28/22   Diana Pardo APRN   glucose monitor monitoring kit 1 each As Needed (for  diabetes). 1/3/20   Cristina Davis APRN   hydrALAZINE (APRESOLINE) 50 MG tablet Take 50 mg by mouth Every 8 (Eight) Hours.    Talat Solis MD   Lancets (ONETOUCH ULTRASOFT) lancets Check daily and prn 1/3/20   Cristina Davis APRN   metFORMIN (GLUCOPHAGE) 850 MG tablet TAKE 1/2 TABLET BY MOUTH TWO TIMES A DAY 8/2/22   Diana Pardo APRN   Multiple Vitamin (MULTI-VITAMIN DAILY PO) Take 1 tablet by mouth Daily.    Talat Solis MD   nitroglycerin (NITROSTAT) 0.3 MG SL tablet Place 1 tablet under the tongue Every 5 (Five) Minutes As Needed for Chest Pain. Take no more than 3 doses in 15 minutes. 7/12/22   Brayan Epps MD   ondansetron ODT (ZOFRAN-ODT) 4 MG disintegrating tablet Place 1 tablet on the tongue Every 8 (Eight) Hours As Needed for Nausea or Vomiting. 9/9/20   Diana Pardo APRN   oxybutynin XL (DITROPAN-XL) 10 MG 24 hr tablet Take 10 mg by mouth Daily.    Talat Solis MD   Ozempic, 0.25 or 0.5 MG/DOSE, 2 MG/1.5ML solution pen-injector INJECT 0.25MG UNDER THE SKIN INTO THE APPROPRIATE AREA ONCE WEEKLY AS DIRECTED  Patient taking differently: Inject 0.25 mg under the skin into the appropriate area as directed 1 (One) Time Per Week. On Tuesdays 11/3/21   Diana Pardo APRN   Potassium Gluconate 2 MEQ tablet Take 2 mEq by mouth Daily.    Talat Solis MD   tamsulosin (FLOMAX) 0.4 MG capsule 24 hr capsule Take 1 capsule by mouth Daily. 1/3/20   Cristina Davis APRN   valsartan (DIOVAN) 320 MG tablet Take 320 mg by mouth every night at bedtime. 2/1/22   Talat Solis MD       FH:  Family History   Problem Relation Age of Onset   • Hypertension Mother    • Heart failure Mother    • Stroke Mother    • Diabetes Mother    • Heart disease Mother    • Heart attack Father    • Diabetes Father    • Heart disease Father    • No Known Problems Sister    • No Known Problems Sister    • Leukemia Sister    • Cerebral palsy Brother    • No Known Problems Brother    •  "No Known Problems Brother    • No Known Problems Daughter    • Diabetes Son        Objective   Vital Signs:  /73   Pulse 75   Ht 185.4 cm (73\")   Wt 115 kg (254 lb)   SpO2 93%   BMI 33.51 kg/m²           Physical Exam  Constitutional:       Appearance: Normal appearance.   HENT:      Head: Normocephalic and atraumatic.      Nose: Nose normal.      Mouth/Throat:      Mouth: Mucous membranes are dry.   Cardiovascular:      Rate and Rhythm: Normal rate and regular rhythm.      Heart sounds: No murmur heard.    No friction rub. No gallop.   Pulmonary:      Effort: Pulmonary effort is normal. No respiratory distress.      Breath sounds: Normal breath sounds. No wheezing or rhonchi.   Neurological:      Mental Status: He is alert and oriented to person, place, and time.   Psychiatric:         Behavior: Behavior normal.       Mallampati Score: III (soft and hard palate and base of uvula visible)    Result Review :              Assessment and Plan  This is a pleasant 62-year-old gentleman who comes to establish care for obstructive sleep apnea.  The patient has been exceptionally compliant with CPAP.  Compliance notes 100% greater than 4 hours, and AHI of 2.9.  Settings: CPAP-C-flex plus, CPAP pressure 16 cm H2O, C-Flex setting to, ramp linear, ramp time 30 minutes, ramp start pressure 6 cm H2O    Diagnoses and all orders for this visit:    1. Obstructive sleep apnea, adult (Primary)  -     PAP Therapy                 I discussed the consequences of uncontrolled sleep apnea including hypertension, heart disease, diabetes, stroke, and dementia. I further discussed sleep apnea therapeutic options including CPAP, Weight loss, Oral dental appliance, and surgery.    Follow Up  Return in about 1 year (around 8/3/2023) for 90 days after PAP setup.  Patient was given instructions and counseling regarding his condition or for health maintenance advice. Please see specific information pulled into the AVS if appropriate. "     BRIDGET Wells, ACNP-BC  Pulmonary, Critical Care Medicine, and Sleep Medicine  Electronically signed by BRIDGET Brown, 08/03/22, 10:51 AM EDT.

## 2022-08-13 PROCEDURE — 93295 DEV INTERROG REMOTE 1/2/MLT: CPT | Performed by: STUDENT IN AN ORGANIZED HEALTH CARE EDUCATION/TRAINING PROGRAM

## 2022-08-13 PROCEDURE — 93296 REM INTERROG EVL PM/IDS: CPT | Performed by: STUDENT IN AN ORGANIZED HEALTH CARE EDUCATION/TRAINING PROGRAM

## 2022-08-25 ENCOUNTER — TELEPHONE (OUTPATIENT)
Dept: FAMILY MEDICINE CLINIC | Facility: CLINIC | Age: 62
End: 2022-08-25

## 2022-08-26 ENCOUNTER — TELEPHONE (OUTPATIENT)
Dept: SLEEP MEDICINE | Facility: HOSPITAL | Age: 62
End: 2022-08-26

## 2022-08-26 NOTE — TELEPHONE ENCOUNTER
Caller: Omar Can    Relationship: Self    Best call back number: [phone]      Additional notes: PATIENT STATED THAT [facility] HAS NOT RECEIVED ORDER FOR SUPPLIES. WOULD LIKE TO SPEAK WITH SOMEONE IN OFFICE REGARDING THIS.

## 2022-09-14 ENCOUNTER — OFFICE VISIT (OUTPATIENT)
Dept: FAMILY MEDICINE CLINIC | Facility: CLINIC | Age: 62
End: 2022-09-14

## 2022-09-14 VITALS
BODY MASS INDEX: 33 KG/M2 | HEIGHT: 73 IN | WEIGHT: 249 LBS | TEMPERATURE: 98 F | RESPIRATION RATE: 22 BRPM | SYSTOLIC BLOOD PRESSURE: 122 MMHG | DIASTOLIC BLOOD PRESSURE: 78 MMHG | HEART RATE: 74 BPM | OXYGEN SATURATION: 96 %

## 2022-09-14 DIAGNOSIS — E11.9 TYPE 2 DIABETES MELLITUS WITHOUT COMPLICATION, WITHOUT LONG-TERM CURRENT USE OF INSULIN: Chronic | ICD-10-CM

## 2022-09-14 DIAGNOSIS — I10 PRIMARY HYPERTENSION: Chronic | ICD-10-CM

## 2022-09-14 DIAGNOSIS — E78.2 MIXED HYPERLIPIDEMIA: Chronic | ICD-10-CM

## 2022-09-14 DIAGNOSIS — N40.0 PROSTATE HYPERTROPHY: ICD-10-CM

## 2022-09-14 DIAGNOSIS — Z00.00 MEDICARE ANNUAL WELLNESS VISIT, SUBSEQUENT: Primary | ICD-10-CM

## 2022-09-14 DIAGNOSIS — Z76.0 MEDICATION REFILL: ICD-10-CM

## 2022-09-14 DIAGNOSIS — L98.9 SKIN LESION OF SCALP: ICD-10-CM

## 2022-09-14 PROCEDURE — 1126F AMNT PAIN NOTED NONE PRSNT: CPT | Performed by: NURSE PRACTITIONER

## 2022-09-14 PROCEDURE — 1170F FXNL STATUS ASSESSED: CPT | Performed by: NURSE PRACTITIONER

## 2022-09-14 PROCEDURE — G0439 PPPS, SUBSEQ VISIT: HCPCS | Performed by: NURSE PRACTITIONER

## 2022-09-14 PROCEDURE — 1159F MED LIST DOCD IN RCRD: CPT | Performed by: NURSE PRACTITIONER

## 2022-09-14 RX ORDER — TAMSULOSIN HYDROCHLORIDE 0.4 MG/1
1 CAPSULE ORAL DAILY
Qty: 90 CAPSULE | Refills: 2 | Status: SHIPPED | OUTPATIENT
Start: 2022-09-14 | End: 2023-03-14 | Stop reason: SDUPTHER

## 2022-09-14 NOTE — PROGRESS NOTES
The ABCs of the Annual Wellness Visit  Subsequent Medicare Wellness Visit    Chief Complaint   Patient presents with   • Medicare Wellness-subsequent      Subjective    History of Present Illness:  Omar Can is a 62 y.o. male who presents for a Subsequent Medicare Wellness Visit.    The following portions of the patient's history were reviewed and   updated as appropriate: allergies, current medications, past family history, past medical history, past social history, past surgical history and problem list.    Compared to one year ago, the patient feels his physical   health is the same.    Compared to one year ago, the patient feels his mental   health is the same.    Recent Hospitalizations:  He was not admitted to the hospital during the last year.       Current Medical Providers:  Patient Care Team:  Diana Pardo APRN as PCP - General (Family Medicine)  Todd Ponce MD as Consulting Physician (Urology)  Margarito Damon MD as Consulting Physician (Cardiology)  Brayan Epps MD as Consulting Physician (Cardiology)  Charan Durham APRN as Nurse Practitioner (Pulmonary Disease)    Outpatient Medications Prior to Visit   Medication Sig Dispense Refill   • amLODIPine (NORVASC) 10 MG tablet Take 10 mg by mouth Daily.     • aspirin 81 MG tablet Take 81 mg by mouth Daily.     • atorvastatin (LIPITOR) 40 MG tablet TAKE 1 TABLET BY MOUTH EVERY NIGHT 90 tablet 0   • carvedilol (COREG) 25 MG tablet Take 25 mg by mouth 2 (Two) Times a Day With Meals.     • coenzyme Q10 100 MG capsule Take 100 mg by mouth Daily.     • finasteride (PROSCAR) 5 MG tablet Take 5 mg by mouth Daily.     • furosemide (LASIX) 20 MG tablet Take 20 mg by mouth As Needed.     • glimepiride (AMARYL) 4 MG tablet TAKE 1 TABLET BY MOUTH TWO TIMES A  tablet 0   • glucose blood (FREESTYLE LITE) test strip USE TO CHECK BLOOD GLUCOSE UP TO THREE TIMES DAILY OR AS DIRECTED 100 each 11   • glucose monitor monitoring kit 1 each As  Needed (for diabetes). 1 each 0   • hydrALAZINE (APRESOLINE) 50 MG tablet Take 50 mg by mouth Every 8 (Eight) Hours.     • Lancets (ONETOUCH ULTRASOFT) lancets Check daily and prn 100 each 12   • metFORMIN (GLUCOPHAGE) 850 MG tablet TAKE 1/2 TABLET BY MOUTH TWO TIMES A DAY 90 tablet 0   • Multiple Vitamin (MULTI-VITAMIN DAILY PO) Take 1 tablet by mouth Daily.     • nitroglycerin (NITROSTAT) 0.3 MG SL tablet Place 1 tablet under the tongue Every 5 (Five) Minutes As Needed for Chest Pain. Take no more than 3 doses in 15 minutes. 30 tablet 3   • oxybutynin XL (DITROPAN-XL) 10 MG 24 hr tablet Take 10 mg by mouth Daily.     • Ozempic, 0.25 or 0.5 MG/DOSE, 2 MG/1.5ML solution pen-injector INJECT 0.25MG UNDER THE SKIN INTO THE APPROPRIATE AREA ONCE WEEKLY AS DIRECTED (Patient taking differently: Inject 0.25 mg under the skin into the appropriate area as directed 1 (One) Time Per Week. On Tuesdays) 1.5 mL 5   • Potassium Gluconate 2 MEQ tablet Take 2 mEq by mouth Daily.     • valsartan (DIOVAN) 320 MG tablet Take 320 mg by mouth every night at bedtime.     • ondansetron ODT (ZOFRAN-ODT) 4 MG disintegrating tablet Place 1 tablet on the tongue Every 8 (Eight) Hours As Needed for Nausea or Vomiting. 20 tablet 0   • tamsulosin (FLOMAX) 0.4 MG capsule 24 hr capsule Take 1 capsule by mouth Daily. 90 capsule 2     No facility-administered medications prior to visit.       No opioid medication identified on active medication list. I have reviewed chart for other potential  high risk medication/s and harmful drug interactions in the elderly.          Aspirin is on active medication list. Aspirin use is not indicated based on review of current medical condition/s. Risk of harm outweighs potential benefits. Patient instructed to discontinue this medication.  .      Patient Active Problem List   Diagnosis   • Cholestanol storage disease   • Hyperlipidemia   • Hypertensive disorder   • Obstructive sleep apnea syndrome   • CAD (coronary  "artery disease)   • Atrioventricular block, first degree   • Hydronephrosis   • Biventricular implantable cardioverter-defibrillator (ICD) in situ   • Stage 3 chronic kidney disease (HCC)   • Type 2 diabetes mellitus without complication, without long-term current use of insulin (HCC)   • Prostate hypertrophy   • Morbidly obese (HCC)   • Chronic pain of left knee   • Primary hypertension     Advance Care Planning  Advance Directive is not on file.  ACP discussion was held with the patient during this visit. Patient does not have an advance directive, information provided.    Review of Systems   Constitutional: Negative.    HENT: Negative.    Respiratory: Negative.    Cardiovascular: Negative for chest pain, palpitations and leg swelling.   Gastrointestinal: Negative.    Genitourinary: Negative.    Musculoskeletal: Positive for arthralgias (OA).   Skin:        Scalp lesion   Neurological: Negative.         Objective    Vitals:    09/14/22 0802   BP: 122/78   Pulse: 74   Resp: 22   Temp: 98 °F (36.7 °C)   SpO2: 96%   Weight: 113 kg (249 lb)   Height: 185.4 cm (73\")   PainSc: 0-No pain     Estimated body mass index is 32.85 kg/m² as calculated from the following:    Height as of this encounter: 185.4 cm (73\").    Weight as of this encounter: 113 kg (249 lb).    BMI is >= 30 and <35. (Class 1 Obesity). The following options were offered after discussion;: weight loss educational material (shared in after visit summary)      Does the patient have evidence of cognitive impairment? No    Physical Exam  Vitals and nursing note reviewed. Exam conducted with a chaperone present.   Constitutional:       General: He is not in acute distress.     Appearance: Normal appearance. He is well-developed and well-groomed. He is not ill-appearing, toxic-appearing or diaphoretic.   HENT:      Head: Normocephalic and atraumatic.      Right Ear: Tympanic membrane, ear canal and external ear normal.      Left Ear: Tympanic membrane, ear " canal and external ear normal.      Nose: Nose normal.      Mouth/Throat:      Lips: Pink.      Mouth: Mucous membranes are moist.      Pharynx: Oropharynx is clear. Uvula midline. No posterior oropharyngeal erythema.   Neck:      Thyroid: No thyroid mass, thyromegaly or thyroid tenderness.   Cardiovascular:      Rate and Rhythm: Normal rate and regular rhythm.      Pulses: Normal pulses.      Heart sounds: Normal heart sounds, S1 normal and S2 normal. No murmur heard.  Pulmonary:      Effort: Pulmonary effort is normal. No respiratory distress.      Breath sounds: Normal breath sounds.   Abdominal:      General: Bowel sounds are normal. There is no distension.      Palpations: Abdomen is soft.      Tenderness: There is no abdominal tenderness.   Musculoskeletal:         General: Normal range of motion.      Cervical back: Normal range of motion and neck supple.      Right lower leg: No edema.      Left lower leg: No edema.   Lymphadenopathy:      Cervical: No cervical adenopathy.   Skin:     General: Skin is warm and dry.      Capillary Refill: Capillary refill takes less than 2 seconds.      Findings: No rash.          Neurological:      General: No focal deficit present.      Mental Status: He is alert and oriented to person, place, and time.   Psychiatric:         Attention and Perception: Attention and perception normal.         Mood and Affect: Mood and affect normal.         Speech: Speech normal.         Behavior: Behavior normal. Behavior is cooperative.         Thought Content: Thought content normal.         Cognition and Memory: Cognition and memory normal.         Judgment: Judgment normal.                HEALTH RISK ASSESSMENT    Smoking Status:  Social History     Tobacco Use   Smoking Status Former Smoker   • Packs/day: 1.00   • Years: 25.00   • Pack years: 25.00   • Quit date: 1996   • Years since quittin.7   Smokeless Tobacco Never Used   Tobacco Comment    quit 25 years ago     Alcohol  Consumption:  Social History     Substance and Sexual Activity   Alcohol Use Never     Fall Risk Screen:    STEADI Fall Risk Assessment was completed, and patient is at LOW risk for falls.Assessment completed on:9/14/2022    Depression Screening:  PHQ-2/PHQ-9 Depression Screening 9/14/2022   Retired Total Score -   Little Interest or Pleasure in Doing Things 0-->not at all   Feeling Down, Depressed or Hopeless 0-->not at all   PHQ-9: Brief Depression Severity Measure Score 0       Health Habits and Functional and Cognitive Screening:  Functional & Cognitive Status 9/14/2022   Do you have difficulty preparing food and eating? No   Do you have difficulty bathing yourself, getting dressed or grooming yourself? No   Do you have difficulty using the toilet? No   Do you have difficulty moving around from place to place? No   Do you have trouble with steps or getting out of a bed or a chair? No   Current Diet Well Balanced Diet   Dental Exam Not up to date   Eye Exam Up to date   Exercise (times per week) 2 times per week   Current Exercises Include Walking   Do you need help using the phone?  No   Are you deaf or do you have serious difficulty hearing?  No   Do you need help with transportation? No   Do you need help shopping? No   Do you need help preparing meals?  No   Do you need help with housework?  No   Do you need help with laundry? No   Do you need help taking your medications? No   Do you need help managing money? No   Do you ever drive or ride in a car without wearing a seat belt? No   Have you felt unusual stress, anger or loneliness in the last month? No   Who do you live with? Spouse   If you need help, do you have trouble finding someone available to you? No   Have you been bothered in the last four weeks by sexual problems? No   Do you have difficulty concentrating, remembering or making decisions? No       Age-appropriate Screening Schedule:  Refer to the list below for future screening recommendations  based on patient's age, sex and/or medical conditions. Orders for these recommended tests are listed in the plan section. The patient has been provided with a written plan.    Health Maintenance   Topic Date Due   • ZOSTER VACCINE (1 of 2) Never done   • DIABETIC FOOT EXAM  02/24/2022   • INFLUENZA VACCINE  10/01/2022   • DIABETIC EYE EXAM  01/14/2023   • HEMOGLOBIN A1C  03/15/2023   • LIPID PANEL  09/15/2023   • URINE MICROALBUMIN  09/15/2023   • TDAP/TD VACCINES (2 - Td or Tdap) 02/24/2032              Assessment & Plan   CMS Preventative Services Quick Reference  Risk Factors Identified During Encounter  Cardiovascular Disease  Immunizations Discussed/Encouraged (specific Immunizations; Prevnar 20 (Pneumococcal 20-valent conjugate), Shingrix and COVID19  Obesity/Overweight   The above risks/problems have been discussed with the patient.  Follow up actions/plans if indicated are seen below in the Assessment/Plan Section.  Pertinent information has been shared with the patient in the After Visit Summary.    Diagnoses and all orders for this visit:    1. Medicare annual wellness visit, subsequent (Primary)  -     Lipid Panel; Future    2. Primary hypertension  -     Comprehensive Metabolic Panel; Future  -     CBC (No Diff); Future  -     Urinalysis With Culture If Indicated - Urine, Clean Catch; Future    3. Mixed hyperlipidemia  -     Lipid Panel; Future    4. Type 2 diabetes mellitus without complication, without long-term current use of insulin (HCC)  -     MicroAlbumin, Urine, Random - Urine, Clean Catch; Future  -     Hemoglobin A1c; Future    5. Prostate hypertrophy  -     tamsulosin (FLOMAX) 0.4 MG capsule 24 hr capsule; Take 1 capsule by mouth Daily.  Dispense: 90 capsule; Refill: 2  -     Ambulatory Referral to Urology    6. Skin lesion of scalp  -     Ambulatory Referral to Dermatology    7. Medication refill  -     tamsulosin (FLOMAX) 0.4 MG capsule 24 hr capsule; Take 1 capsule by mouth Daily.   Dispense: 90 capsule; Refill: 2        Follow Up:   Return in about 3 months (around 12/14/2022) for Next scheduled follow up.     An After Visit Summary and PPPS were made available to the patient.    Discussed the nature of the medical condition(s) risks, complications, implications, management, safe and proper use of medications. Encouraged medication compliance, and keeping scheduled follow up appointments with me and any other providers.

## 2022-09-14 NOTE — PATIENT INSTRUCTIONS
Medicare Wellness  Personal Prevention Plan of Service     Date of Office Visit:    Encounter Provider:  BRIDGET Carter  Place of Service:  [facility]  Patient Name: Oamr Can  :  1960    As part of the Medicare Wellness portion of your visit today, we are providing you with this personalized preventive plan of services (PPPS). This plan is based upon recommendations of the [place] Preventive Services Task Force (USPSTF) and the Advisory Committee on Immunization Practices (ACIP).    This lists the preventive care services that should be considered, and provides dates of when you are due. Items listed as completed are up-to-date and do not require any further intervention.    Health Maintenance   Topic Date Due    Pneumococcal Vaccine 0-64 (1 - PCV) Never done    ZOSTER VACCINE (1 of 2) Never done    ANNUAL WELLNESS VISIT  Never done    COVID-19 Vaccine (4 - Booster for Pfizer series) 2022    DIABETIC FOOT EXAM  2022    LIPID PANEL  2022    URINE MICROALBUMIN  2022    INFLUENZA VACCINE  10/01/2022    HEMOGLOBIN A1C  2022    DIABETIC EYE EXAM  2023    COLORECTAL CANCER SCREENING  2031    TDAP/TD VACCINES (2 - Td or Tdap) 2032    HEPATITIS C SCREENING  Completed       Orders Placed This Encounter   Procedures    Comprehensive Metabolic Panel     Standing Status:   Future     Standing Expiration Date:   2023     Order Specific Question:   Release to patient     Answer:   Routine Release    CBC (No Diff)     Standing Status:   Future     Standing Expiration Date:   2023     Order Specific Question:   Release to patient     Answer:   Immediate    Lipid Panel     Standing Status:   Future     Standing Expiration Date:   2023    Urinalysis With Culture If Indicated - Urine, Clean Catch     Standing Status:   Future     Standing Expiration Date:   2023    MicroAlbumin, Urine, Random - Urine,  Clean Catch     Standing Status:   Future     Standing Expiration Date:   9/14/2023     Order Specific Question:   Release to patient     Answer:   Routine Release       No follow-ups on file.

## 2022-09-15 ENCOUNTER — LAB (OUTPATIENT)
Dept: LAB | Facility: HOSPITAL | Age: 62
End: 2022-09-15

## 2022-09-15 DIAGNOSIS — E78.2 MIXED HYPERLIPIDEMIA: Chronic | ICD-10-CM

## 2022-09-15 DIAGNOSIS — E11.9 TYPE 2 DIABETES MELLITUS WITHOUT COMPLICATION, WITHOUT LONG-TERM CURRENT USE OF INSULIN: Chronic | ICD-10-CM

## 2022-09-15 DIAGNOSIS — I10 PRIMARY HYPERTENSION: Chronic | ICD-10-CM

## 2022-09-15 LAB
ALBUMIN SERPL-MCNC: 4.3 G/DL (ref 3.5–5.2)
ALBUMIN UR-MCNC: 1.6 MG/DL
ALBUMIN/GLOB SERPL: 1.7 G/DL
ALP SERPL-CCNC: 78 U/L (ref 39–117)
ALT SERPL W P-5'-P-CCNC: 28 U/L (ref 1–41)
ANION GAP SERPL CALCULATED.3IONS-SCNC: 11.1 MMOL/L (ref 5–15)
AST SERPL-CCNC: 27 U/L (ref 1–40)
BILIRUB SERPL-MCNC: 0.5 MG/DL (ref 0–1.2)
BILIRUB UR QL STRIP: NEGATIVE
BUN SERPL-MCNC: 17 MG/DL (ref 8–23)
BUN/CREAT SERPL: 15.9 (ref 7–25)
CALCIUM SPEC-SCNC: 9.4 MG/DL (ref 8.6–10.5)
CHLORIDE SERPL-SCNC: 100 MMOL/L (ref 98–107)
CHOLEST SERPL-MCNC: 111 MG/DL (ref 0–200)
CLARITY UR: CLEAR
CO2 SERPL-SCNC: 26.9 MMOL/L (ref 22–29)
COLOR UR: YELLOW
CREAT SERPL-MCNC: 1.07 MG/DL (ref 0.76–1.27)
DEPRECATED RDW RBC AUTO: 39.4 FL (ref 37–54)
EGFRCR SERPLBLD CKD-EPI 2021: 77.5 ML/MIN/1.73
ERYTHROCYTE [DISTWIDTH] IN BLOOD BY AUTOMATED COUNT: 12.7 % (ref 12.3–15.4)
GLOBULIN UR ELPH-MCNC: 2.5 GM/DL
GLUCOSE SERPL-MCNC: 149 MG/DL (ref 65–99)
GLUCOSE UR STRIP-MCNC: NEGATIVE MG/DL
HBA1C MFR BLD: 7.1 % (ref 4.8–5.6)
HCT VFR BLD AUTO: 42.5 % (ref 37.5–51)
HDLC SERPL-MCNC: 32 MG/DL (ref 40–60)
HGB BLD-MCNC: 14.4 G/DL (ref 13–17.7)
HGB UR QL STRIP.AUTO: NEGATIVE
KETONES UR QL STRIP: NEGATIVE
LDLC SERPL CALC-MCNC: 58 MG/DL (ref 0–100)
LDLC/HDLC SERPL: 1.76 {RATIO}
LEUKOCYTE ESTERASE UR QL STRIP.AUTO: NEGATIVE
MCH RBC QN AUTO: 29.4 PG (ref 26.6–33)
MCHC RBC AUTO-ENTMCNC: 33.9 G/DL (ref 31.5–35.7)
MCV RBC AUTO: 86.9 FL (ref 79–97)
NITRITE UR QL STRIP: NEGATIVE
PH UR STRIP.AUTO: 7 [PH] (ref 5–8)
PLATELET # BLD AUTO: 204 10*3/MM3 (ref 140–450)
PMV BLD AUTO: 11.4 FL (ref 6–12)
POTASSIUM SERPL-SCNC: 4 MMOL/L (ref 3.5–5.2)
PROT SERPL-MCNC: 6.8 G/DL (ref 6–8.5)
PROT UR QL STRIP: NEGATIVE
RBC # BLD AUTO: 4.89 10*6/MM3 (ref 4.14–5.8)
SODIUM SERPL-SCNC: 138 MMOL/L (ref 136–145)
SP GR UR STRIP: 1.01 (ref 1–1.03)
TRIGL SERPL-MCNC: 113 MG/DL (ref 0–150)
UROBILINOGEN UR QL STRIP: NORMAL
VLDLC SERPL-MCNC: 21 MG/DL (ref 5–40)
WBC NRBC COR # BLD: 4.95 10*3/MM3 (ref 3.4–10.8)

## 2022-09-15 PROCEDURE — 81003 URINALYSIS AUTO W/O SCOPE: CPT

## 2022-09-15 PROCEDURE — 80061 LIPID PANEL: CPT

## 2022-09-15 PROCEDURE — 85027 COMPLETE CBC AUTOMATED: CPT

## 2022-09-15 PROCEDURE — 83036 HEMOGLOBIN GLYCOSYLATED A1C: CPT

## 2022-09-15 PROCEDURE — 80053 COMPREHEN METABOLIC PANEL: CPT

## 2022-09-15 PROCEDURE — 82043 UR ALBUMIN QUANTITATIVE: CPT

## 2022-10-04 DIAGNOSIS — E11.9 TYPE 2 DIABETES MELLITUS TREATED WITHOUT INSULIN: ICD-10-CM

## 2022-10-04 DIAGNOSIS — N18.30 STAGE 3 CHRONIC KIDNEY DISEASE: ICD-10-CM

## 2022-10-04 RX ORDER — SEMAGLUTIDE 1.34 MG/ML
INJECTION, SOLUTION SUBCUTANEOUS
Qty: 1.5 ML | Refills: 0 | Status: SHIPPED | OUTPATIENT
Start: 2022-10-04 | End: 2022-11-15

## 2022-10-20 DIAGNOSIS — E11.9 TYPE 2 DIABETES MELLITUS TREATED WITHOUT INSULIN: ICD-10-CM

## 2022-10-20 DIAGNOSIS — Z76.0 MEDICATION REFILL: ICD-10-CM

## 2022-11-07 DIAGNOSIS — E78.5 HYPERLIPIDEMIA, UNSPECIFIED HYPERLIPIDEMIA TYPE: ICD-10-CM

## 2022-11-07 DIAGNOSIS — Z76.0 MEDICATION REFILL: ICD-10-CM

## 2022-11-07 RX ORDER — ATORVASTATIN CALCIUM 40 MG/1
40 TABLET, FILM COATED ORAL NIGHTLY
Qty: 90 TABLET | Refills: 0 | Status: SHIPPED | OUTPATIENT
Start: 2022-11-07 | End: 2022-12-12

## 2022-11-09 DIAGNOSIS — Z76.0 MEDICATION REFILL: ICD-10-CM

## 2022-11-09 DIAGNOSIS — E11.9 TYPE 2 DIABETES MELLITUS TREATED WITHOUT INSULIN: ICD-10-CM

## 2022-11-09 RX ORDER — GLIMEPIRIDE 4 MG/1
4 TABLET ORAL 2 TIMES DAILY
Qty: 180 TABLET | Refills: 0 | Status: SHIPPED | OUTPATIENT
Start: 2022-11-09 | End: 2023-02-20 | Stop reason: SDUPTHER

## 2022-11-12 PROCEDURE — 93296 REM INTERROG EVL PM/IDS: CPT | Performed by: STUDENT IN AN ORGANIZED HEALTH CARE EDUCATION/TRAINING PROGRAM

## 2022-11-12 PROCEDURE — 93295 DEV INTERROG REMOTE 1/2/MLT: CPT | Performed by: STUDENT IN AN ORGANIZED HEALTH CARE EDUCATION/TRAINING PROGRAM

## 2022-11-15 DIAGNOSIS — E11.9 TYPE 2 DIABETES MELLITUS TREATED WITHOUT INSULIN: ICD-10-CM

## 2022-11-15 DIAGNOSIS — N18.30 STAGE 3 CHRONIC KIDNEY DISEASE: ICD-10-CM

## 2022-11-15 RX ORDER — SEMAGLUTIDE 1.34 MG/ML
INJECTION, SOLUTION SUBCUTANEOUS
Qty: 1.5 ML | Refills: 0 | Status: SHIPPED | OUTPATIENT
Start: 2022-11-15 | End: 2023-01-05

## 2022-11-23 ENCOUNTER — TELEPHONE (OUTPATIENT)
Dept: CARDIOLOGY | Facility: CLINIC | Age: 62
End: 2022-11-23

## 2022-11-23 ENCOUNTER — PRE-ADMISSION TESTING (OUTPATIENT)
Dept: PREADMISSION TESTING | Facility: HOSPITAL | Age: 62
End: 2022-11-23

## 2022-11-23 LAB
ANION GAP SERPL CALCULATED.3IONS-SCNC: 11 MMOL/L (ref 5–15)
APTT PPP: 30.9 SECONDS (ref 22–39)
BUN SERPL-MCNC: 17 MG/DL (ref 8–23)
BUN/CREAT SERPL: 18.5 (ref 7–25)
CALCIUM SPEC-SCNC: 9.9 MG/DL (ref 8.6–10.5)
CHLORIDE SERPL-SCNC: 102 MMOL/L (ref 98–107)
CO2 SERPL-SCNC: 26 MMOL/L (ref 22–29)
CREAT SERPL-MCNC: 0.92 MG/DL (ref 0.76–1.27)
DEPRECATED RDW RBC AUTO: 39.8 FL (ref 37–54)
EGFRCR SERPLBLD CKD-EPI 2021: 92.9 ML/MIN/1.73
ERYTHROCYTE [DISTWIDTH] IN BLOOD BY AUTOMATED COUNT: 12.1 % (ref 12.3–15.4)
GLUCOSE SERPL-MCNC: 94 MG/DL (ref 65–99)
HCT VFR BLD AUTO: 43.7 % (ref 37.5–51)
HGB BLD-MCNC: 14.8 G/DL (ref 13–17.7)
INR PPP: 0.97 (ref 0.84–1.13)
MCH RBC QN AUTO: 30.2 PG (ref 26.6–33)
MCHC RBC AUTO-ENTMCNC: 33.9 G/DL (ref 31.5–35.7)
MCV RBC AUTO: 89.2 FL (ref 79–97)
PLATELET # BLD AUTO: 211 10*3/MM3 (ref 140–450)
PMV BLD AUTO: 10.5 FL (ref 6–12)
POTASSIUM SERPL-SCNC: 3.7 MMOL/L (ref 3.5–5.2)
PROTHROMBIN TIME: 12.8 SECONDS (ref 11.4–14.4)
QT INTERVAL: 442 MS
QTC INTERVAL: 480 MS
RBC # BLD AUTO: 4.9 10*6/MM3 (ref 4.14–5.8)
SODIUM SERPL-SCNC: 139 MMOL/L (ref 136–145)
WBC NRBC COR # BLD: 6.84 10*3/MM3 (ref 3.4–10.8)

## 2022-11-23 PROCEDURE — 85730 THROMBOPLASTIN TIME PARTIAL: CPT

## 2022-11-23 PROCEDURE — 36415 COLL VENOUS BLD VENIPUNCTURE: CPT

## 2022-11-23 PROCEDURE — 80048 BASIC METABOLIC PNL TOTAL CA: CPT

## 2022-11-23 PROCEDURE — 93005 ELECTROCARDIOGRAM TRACING: CPT

## 2022-11-23 PROCEDURE — 85610 PROTHROMBIN TIME: CPT

## 2022-11-23 PROCEDURE — 85027 COMPLETE CBC AUTOMATED: CPT

## 2022-11-23 PROCEDURE — 93010 ELECTROCARDIOGRAM REPORT: CPT | Performed by: INTERNAL MEDICINE

## 2022-11-23 RX ORDER — ONDANSETRON 4 MG/1
4 TABLET, ORALLY DISINTEGRATING ORAL EVERY 8 HOURS PRN
Status: ON HOLD | COMMUNITY
End: 2022-12-07

## 2022-11-23 NOTE — TELEPHONE ENCOUNTER
Clara in [facility] called to let you know that the patient is scheduled for a transurethral resection the the prostate with Dr. Ponce on 12/7/22. He would like the patient to hold Aspirin 7 days prior. Is this ok with you?

## 2022-11-23 NOTE — PAT
An arrival time for procedure was not provided during PAT visit. If patient had any questions or concerns about their arrival time, they were instructed to contact their surgeon/physician.  Additionally, if the patient referred to an arrival time that was acquired from their my chart account, patient was encouraged to verify that time with their surgeon/physician. Arrival times are NOT provided in Pre Admission Testing Department.    Per Anesthesia Request, patient instructed not to take their ACE/ARB medications on the AM of surgery.      No orders to consent in PAT, please obtain DOS.     Pt states his paperwork from Dr. Ponce's office requests to hold ASA 7 days prior to surgery.   Left voicemail with Dr. Epps's nurse to address request to hold ASA in Epic.     Pacemaker/ICD last interrogated 11-2-2022, report on chart.  Battery life 3.58 years.

## 2022-12-06 ENCOUNTER — ANESTHESIA EVENT (OUTPATIENT)
Dept: PERIOP | Facility: HOSPITAL | Age: 62
End: 2022-12-06

## 2022-12-06 RX ORDER — FAMOTIDINE 10 MG/ML
20 INJECTION, SOLUTION INTRAVENOUS ONCE
Status: CANCELLED | OUTPATIENT
Start: 2022-12-06 | End: 2022-12-06

## 2022-12-06 RX ORDER — SODIUM CHLORIDE 0.9 % (FLUSH) 0.9 %
10 SYRINGE (ML) INJECTION EVERY 12 HOURS SCHEDULED
Status: CANCELLED | OUTPATIENT
Start: 2022-12-06

## 2022-12-06 RX ORDER — SODIUM CHLORIDE 9 MG/ML
40 INJECTION, SOLUTION INTRAVENOUS AS NEEDED
Status: CANCELLED | OUTPATIENT
Start: 2022-12-06

## 2022-12-06 RX ORDER — SODIUM CHLORIDE 0.9 % (FLUSH) 0.9 %
10 SYRINGE (ML) INJECTION AS NEEDED
Status: CANCELLED | OUTPATIENT
Start: 2022-12-06

## 2022-12-07 ENCOUNTER — HOSPITAL ENCOUNTER (OUTPATIENT)
Facility: HOSPITAL | Age: 62
Setting detail: HOSPITAL OUTPATIENT SURGERY
Discharge: HOME | End: 2022-12-07
Attending: UROLOGY | Admitting: UROLOGY

## 2022-12-07 ENCOUNTER — ANESTHESIA (OUTPATIENT)
Dept: PERIOP | Facility: HOSPITAL | Age: 62
End: 2022-12-07

## 2022-12-07 VITALS
DIASTOLIC BLOOD PRESSURE: 83 MMHG | RESPIRATION RATE: 14 BRPM | HEIGHT: 73 IN | TEMPERATURE: 97.8 F | WEIGHT: 260 LBS | HEART RATE: 70 BPM | OXYGEN SATURATION: 93 % | BODY MASS INDEX: 34.46 KG/M2 | SYSTOLIC BLOOD PRESSURE: 142 MMHG

## 2022-12-07 DIAGNOSIS — N40.0 PROSTATE HYPERTROPHY: Primary | Chronic | ICD-10-CM

## 2022-12-07 LAB
GLUCOSE BLDC GLUCOMTR-MCNC: 156 MG/DL (ref 70–130)
POTASSIUM SERPL-SCNC: 3.8 MMOL/L (ref 3.5–5.2)

## 2022-12-07 PROCEDURE — 25010000002 FENTANYL CITRATE (PF) 50 MCG/ML SOLUTION

## 2022-12-07 PROCEDURE — 25010000002 DEXAMETHASONE PER 1 MG: Performed by: ANESTHESIOLOGY

## 2022-12-07 PROCEDURE — 82962 GLUCOSE BLOOD TEST: CPT

## 2022-12-07 PROCEDURE — 84132 ASSAY OF SERUM POTASSIUM: CPT | Performed by: ANESTHESIOLOGY

## 2022-12-07 PROCEDURE — 25010000002 ONDANSETRON PER 1 MG: Performed by: ANESTHESIOLOGY

## 2022-12-07 PROCEDURE — 25010000002 PROPOFOL 10 MG/ML EMULSION: Performed by: ANESTHESIOLOGY

## 2022-12-07 PROCEDURE — 25010000002 LEVOFLOXACIN PER 250 MG: Performed by: UROLOGY

## 2022-12-07 PROCEDURE — 25010000002 FENTANYL CITRATE (PF) 50 MCG/ML SOLUTION: Performed by: ANESTHESIOLOGY

## 2022-12-07 RX ORDER — IPRATROPIUM BROMIDE AND ALBUTEROL SULFATE 2.5; .5 MG/3ML; MG/3ML
3 SOLUTION RESPIRATORY (INHALATION) ONCE AS NEEDED
Status: DISCONTINUED | OUTPATIENT
Start: 2022-12-07 | End: 2022-12-07 | Stop reason: HOSPADM

## 2022-12-07 RX ORDER — MAGNESIUM HYDROXIDE 1200 MG/15ML
LIQUID ORAL AS NEEDED
Status: DISCONTINUED | OUTPATIENT
Start: 2022-12-07 | End: 2022-12-07 | Stop reason: HOSPADM

## 2022-12-07 RX ORDER — SODIUM CHLORIDE 9 MG/ML
40 INJECTION, SOLUTION INTRAVENOUS AS NEEDED
Status: DISCONTINUED | OUTPATIENT
Start: 2022-12-07 | End: 2022-12-07 | Stop reason: HOSPADM

## 2022-12-07 RX ORDER — DROPERIDOL 2.5 MG/ML
0.62 INJECTION, SOLUTION INTRAMUSCULAR; INTRAVENOUS
Status: DISCONTINUED | OUTPATIENT
Start: 2022-12-07 | End: 2022-12-07 | Stop reason: HOSPADM

## 2022-12-07 RX ORDER — PHENYLEPHRINE HCL IN 0.9% NACL 1 MG/10 ML
SYRINGE (ML) INTRAVENOUS AS NEEDED
Status: DISCONTINUED | OUTPATIENT
Start: 2022-12-07 | End: 2022-12-07 | Stop reason: SURG

## 2022-12-07 RX ORDER — DEXAMETHASONE SODIUM PHOSPHATE 4 MG/ML
INJECTION, SOLUTION INTRA-ARTICULAR; INTRALESIONAL; INTRAMUSCULAR; INTRAVENOUS; SOFT TISSUE AS NEEDED
Status: DISCONTINUED | OUTPATIENT
Start: 2022-12-07 | End: 2022-12-07 | Stop reason: SURG

## 2022-12-07 RX ORDER — LIDOCAINE HYDROCHLORIDE 10 MG/ML
INJECTION, SOLUTION EPIDURAL; INFILTRATION; INTRACAUDAL; PERINEURAL AS NEEDED
Status: DISCONTINUED | OUTPATIENT
Start: 2022-12-07 | End: 2022-12-07 | Stop reason: SURG

## 2022-12-07 RX ORDER — MEPERIDINE HYDROCHLORIDE 25 MG/ML
12.5 INJECTION INTRAMUSCULAR; INTRAVENOUS; SUBCUTANEOUS
Status: DISCONTINUED | OUTPATIENT
Start: 2022-12-07 | End: 2022-12-07 | Stop reason: HOSPADM

## 2022-12-07 RX ORDER — PROMETHAZINE HYDROCHLORIDE 25 MG/1
25 TABLET ORAL ONCE AS NEEDED
Status: DISCONTINUED | OUTPATIENT
Start: 2022-12-07 | End: 2022-12-07 | Stop reason: HOSPADM

## 2022-12-07 RX ORDER — HYDRALAZINE HYDROCHLORIDE 20 MG/ML
5 INJECTION INTRAMUSCULAR; INTRAVENOUS
Status: DISCONTINUED | OUTPATIENT
Start: 2022-12-07 | End: 2022-12-07 | Stop reason: HOSPADM

## 2022-12-07 RX ORDER — EPHEDRINE SULFATE 50 MG/ML
INJECTION INTRAVENOUS AS NEEDED
Status: DISCONTINUED | OUTPATIENT
Start: 2022-12-07 | End: 2022-12-07 | Stop reason: SURG

## 2022-12-07 RX ORDER — SODIUM CHLORIDE, SODIUM LACTATE, POTASSIUM CHLORIDE, CALCIUM CHLORIDE 600; 310; 30; 20 MG/100ML; MG/100ML; MG/100ML; MG/100ML
9 INJECTION, SOLUTION INTRAVENOUS CONTINUOUS
Status: DISCONTINUED | OUTPATIENT
Start: 2022-12-07 | End: 2022-12-07 | Stop reason: HOSPADM

## 2022-12-07 RX ORDER — NALOXONE HCL 0.4 MG/ML
0.4 VIAL (ML) INJECTION AS NEEDED
Status: DISCONTINUED | OUTPATIENT
Start: 2022-12-07 | End: 2022-12-07 | Stop reason: HOSPADM

## 2022-12-07 RX ORDER — MIDAZOLAM HYDROCHLORIDE 1 MG/ML
1 INJECTION INTRAMUSCULAR; INTRAVENOUS
Status: DISCONTINUED | OUTPATIENT
Start: 2022-12-07 | End: 2022-12-07 | Stop reason: HOSPADM

## 2022-12-07 RX ORDER — FENTANYL CITRATE 50 UG/ML
INJECTION, SOLUTION INTRAMUSCULAR; INTRAVENOUS
Status: COMPLETED
Start: 2022-12-07 | End: 2022-12-07

## 2022-12-07 RX ORDER — SODIUM CHLORIDE 0.9 % (FLUSH) 0.9 %
3-10 SYRINGE (ML) INJECTION AS NEEDED
Status: DISCONTINUED | OUTPATIENT
Start: 2022-12-07 | End: 2022-12-07 | Stop reason: HOSPADM

## 2022-12-07 RX ORDER — FENTANYL CITRATE 50 UG/ML
50 INJECTION, SOLUTION INTRAMUSCULAR; INTRAVENOUS
Status: DISCONTINUED | OUTPATIENT
Start: 2022-12-07 | End: 2022-12-07 | Stop reason: HOSPADM

## 2022-12-07 RX ORDER — HYDROCODONE BITARTRATE AND ACETAMINOPHEN 10; 325 MG/1; MG/1
1 TABLET ORAL EVERY 6 HOURS PRN
Qty: 20 TABLET | Refills: 0 | Status: SHIPPED | OUTPATIENT
Start: 2022-12-07

## 2022-12-07 RX ORDER — PROPOFOL 10 MG/ML
VIAL (ML) INTRAVENOUS AS NEEDED
Status: DISCONTINUED | OUTPATIENT
Start: 2022-12-07 | End: 2022-12-07 | Stop reason: SURG

## 2022-12-07 RX ORDER — ONDANSETRON 2 MG/ML
4 INJECTION INTRAMUSCULAR; INTRAVENOUS ONCE AS NEEDED
Status: DISCONTINUED | OUTPATIENT
Start: 2022-12-07 | End: 2022-12-07 | Stop reason: HOSPADM

## 2022-12-07 RX ORDER — FAMOTIDINE 20 MG/1
20 TABLET, FILM COATED ORAL ONCE
Status: COMPLETED | OUTPATIENT
Start: 2022-12-07 | End: 2022-12-07

## 2022-12-07 RX ORDER — HYDROMORPHONE HYDROCHLORIDE 1 MG/ML
0.5 INJECTION, SOLUTION INTRAMUSCULAR; INTRAVENOUS; SUBCUTANEOUS
Status: DISCONTINUED | OUTPATIENT
Start: 2022-12-07 | End: 2022-12-07 | Stop reason: HOSPADM

## 2022-12-07 RX ORDER — CIPROFLOXACIN 500 MG/1
500 TABLET, FILM COATED ORAL 2 TIMES DAILY
Qty: 28 TABLET | Refills: 0 | Status: SHIPPED | OUTPATIENT
Start: 2022-12-07 | End: 2023-02-09

## 2022-12-07 RX ORDER — SODIUM CHLORIDE 0.9 % (FLUSH) 0.9 %
3 SYRINGE (ML) INJECTION EVERY 12 HOURS SCHEDULED
Status: DISCONTINUED | OUTPATIENT
Start: 2022-12-07 | End: 2022-12-07 | Stop reason: HOSPADM

## 2022-12-07 RX ORDER — LIDOCAINE HYDROCHLORIDE 10 MG/ML
0.5 INJECTION, SOLUTION EPIDURAL; INFILTRATION; INTRACAUDAL; PERINEURAL ONCE AS NEEDED
Status: COMPLETED | OUTPATIENT
Start: 2022-12-07 | End: 2022-12-07

## 2022-12-07 RX ORDER — LABETALOL HYDROCHLORIDE 5 MG/ML
5 INJECTION, SOLUTION INTRAVENOUS
Status: DISCONTINUED | OUTPATIENT
Start: 2022-12-07 | End: 2022-12-07 | Stop reason: HOSPADM

## 2022-12-07 RX ORDER — DROPERIDOL 2.5 MG/ML
0.62 INJECTION, SOLUTION INTRAMUSCULAR; INTRAVENOUS ONCE AS NEEDED
Status: DISCONTINUED | OUTPATIENT
Start: 2022-12-07 | End: 2022-12-07 | Stop reason: HOSPADM

## 2022-12-07 RX ORDER — HYDROCODONE BITARTRATE AND ACETAMINOPHEN 5; 325 MG/1; MG/1
1 TABLET ORAL ONCE AS NEEDED
Status: DISCONTINUED | OUTPATIENT
Start: 2022-12-07 | End: 2022-12-07 | Stop reason: HOSPADM

## 2022-12-07 RX ORDER — LEVOFLOXACIN 5 MG/ML
500 INJECTION, SOLUTION INTRAVENOUS ONCE
Status: COMPLETED | OUTPATIENT
Start: 2022-12-07 | End: 2022-12-07

## 2022-12-07 RX ORDER — ONDANSETRON 2 MG/ML
INJECTION INTRAMUSCULAR; INTRAVENOUS AS NEEDED
Status: DISCONTINUED | OUTPATIENT
Start: 2022-12-07 | End: 2022-12-07 | Stop reason: SURG

## 2022-12-07 RX ORDER — FENTANYL CITRATE 50 UG/ML
INJECTION, SOLUTION INTRAMUSCULAR; INTRAVENOUS AS NEEDED
Status: DISCONTINUED | OUTPATIENT
Start: 2022-12-07 | End: 2022-12-07 | Stop reason: SURG

## 2022-12-07 RX ORDER — PROMETHAZINE HYDROCHLORIDE 25 MG/1
25 SUPPOSITORY RECTAL ONCE AS NEEDED
Status: DISCONTINUED | OUTPATIENT
Start: 2022-12-07 | End: 2022-12-07 | Stop reason: HOSPADM

## 2022-12-07 RX ADMIN — LIDOCAINE HYDROCHLORIDE 0.2 ML: 10 INJECTION, SOLUTION EPIDURAL; INFILTRATION; INTRACAUDAL; PERINEURAL at 10:00

## 2022-12-07 RX ADMIN — FENTANYL CITRATE 50 MCG: 50 INJECTION, SOLUTION INTRAMUSCULAR; INTRAVENOUS at 13:10

## 2022-12-07 RX ADMIN — FENTANYL CITRATE 50 MCG: 50 INJECTION, SOLUTION INTRAMUSCULAR; INTRAVENOUS at 13:58

## 2022-12-07 RX ADMIN — SODIUM CHLORIDE, POTASSIUM CHLORIDE, SODIUM LACTATE AND CALCIUM CHLORIDE 9 ML/HR: 600; 310; 30; 20 INJECTION, SOLUTION INTRAVENOUS at 10:00

## 2022-12-07 RX ADMIN — FAMOTIDINE 20 MG: 20 TABLET, FILM COATED ORAL at 10:32

## 2022-12-07 RX ADMIN — DEXAMETHASONE SODIUM PHOSPHATE 4 MG: 4 INJECTION, SOLUTION INTRA-ARTICULAR; INTRALESIONAL; INTRAMUSCULAR; INTRAVENOUS; SOFT TISSUE at 13:10

## 2022-12-07 RX ADMIN — LIDOCAINE HYDROCHLORIDE 50 MG: 10 INJECTION, SOLUTION EPIDURAL; INFILTRATION; INTRACAUDAL; PERINEURAL at 13:10

## 2022-12-07 RX ADMIN — Medication 100 MCG: at 13:37

## 2022-12-07 RX ADMIN — FENTANYL CITRATE 50 MCG: 50 INJECTION, SOLUTION INTRAMUSCULAR; INTRAVENOUS at 15:09

## 2022-12-07 RX ADMIN — ONDANSETRON 4 MG: 2 INJECTION INTRAMUSCULAR; INTRAVENOUS at 14:19

## 2022-12-07 RX ADMIN — LEVOFLOXACIN 500 MG: 5 INJECTION, SOLUTION INTRAVENOUS at 13:11

## 2022-12-07 RX ADMIN — Medication 200 MCG: at 13:51

## 2022-12-07 RX ADMIN — EPHEDRINE SULFATE 10 MG: 50 INJECTION INTRAVENOUS at 13:28

## 2022-12-07 RX ADMIN — PROPOFOL 100 MG: 10 INJECTION, EMULSION INTRAVENOUS at 13:10

## 2022-12-07 RX ADMIN — PROPOFOL 25 MCG/KG/MIN: 10 INJECTION, EMULSION INTRAVENOUS at 13:10

## 2022-12-07 NOTE — ANESTHESIA PROCEDURE NOTES
Airway  Urgency: elective    Date/Time: 12/7/2022 1:11 PM  Airway not difficult    General Information and Staff    Patient location during procedure: OR  SRNA: Carrillo Mckee SRNA  Indications and Patient Condition  Indications for airway management: airway protection    Preoxygenated: yes  Mask difficulty assessment: 0 - not attempted    Final Airway Details  Final airway type: supraglottic airway      Successful airway: I-gel  Size 4    Number of attempts at approach: 1  Assessment: lips, teeth, and gum same as pre-op    Additional Comments  LMA placed without difficulty, ventilation with assist, equal breath sounds and symmetric chest rise and fall

## 2022-12-07 NOTE — ANESTHESIA PREPROCEDURE EVALUATION
Anesthesia Evaluation     Patient summary reviewed and Nursing notes reviewed   no history of anesthetic complications:  NPO Solid Status: > 8 hours  NPO Liquid Status: > 2 hours           Airway   Mallampati: II  TM distance: >3 FB  Neck ROM: full  No difficulty expected  Dental - normal exam     Pulmonary - normal exam    breath sounds clear to auscultation  (+) sleep apnea on CPAP,   Cardiovascular - normal exam    ECG reviewed  Rhythm: regular  Rate: normal    (+) pacemaker (interrogated 11/2/22 - 3.5 yrs battery life) ICD, pacemaker, hypertension, CAD, cardiac stents (Off ASA, ok'd by cardiology) dysrhythmias (AV block, NSVT), CHF (history, recent preserved EF) ,       Neuro/Psych  GI/Hepatic/Renal/Endo    (+) obesity,   renal disease (Obstructive uropathy), diabetes mellitus type 2,     Musculoskeletal     Abdominal    Substance History      OB/GYN          Other   arthritis,                      Anesthesia Plan    ASA 4     general     intravenous induction     Anesthetic plan, risks, benefits, and alternatives have been provided, discussed and informed consent has been obtained with: patient.    Plan discussed with CRNA.        CODE STATUS:

## 2022-12-07 NOTE — ANESTHESIA POSTPROCEDURE EVALUATION
Patient: Omar Can    Procedure Summary     Date: 12/07/22 Room / Location:  [facility]    Anesthesia Start: 1301 Anesthesia Stop: 1432    Procedure: TRANSURETHRAL RESECTION OF PROSTATE WITH GREENLIGHT LASER Diagnosis:     Surgeons: Todd Ponce MD Provider: Yao Morataya MD    Anesthesia Type: general ASA Status: 4          Anesthesia Type: general    Vitals  No vitals data found for the desired time range.          Post Anesthesia Care and Evaluation    Patient location during evaluation: PACU  Patient participation: complete - patient participated  Level of consciousness: awake and alert  Pain management: adequate    Airway patency: patent  Anesthetic complications: No anesthetic complications  PONV Status: none  Cardiovascular status: hemodynamically stable and acceptable  Respiratory status: nonlabored ventilation, acceptable and nasal cannula  Hydration status: acceptable

## 2022-12-07 NOTE — OP NOTE
Preop diagnosis: BPH with obstruction    Postop diagnosis: Same    Procedure: Greenlight vaporization prostate    After induction of general anesthesia patient was prepped and draped in the dorsolithotomy position.  Cystoscope was inserted under direct vision.  The urethra was normal.  Prostate showed obstruction from lateral lobe hypertrophy.  The bladder mucosa was normal.  There is no evidence of tumor.  Ureteral orifices were normal position configuration.  Laser fiber was inserted.  Circumferential vaporization was performed from the bladder neck to the verumontanum.  At the end the procedure an open channel was present.  There was minimal bleeding.  The scope was removed.  #20 Villalpando catheter was inserted.  Patient tolerated t procedure well.  Estimated blood loss was 50 mL.  There were no complications

## 2022-12-07 NOTE — H&P
Pre-Op H&P  Omar Can  [id number]  1960    Chief complaint: urinary frequency    HPI:    Patient is a 62 y.o.male who presents with frequent urinary episodes with restricted urine flow     Review of Systems:  General ROS: negative for chills, fever or skin lesions;  No changes since last office visit.  Neg for recent sick exposure  Cardiovascular ROS: no chest pain or dyspnea on exertion  Respiratory ROS: no cough, shortness of breath, or wheezing    Allergies:   Allergies   Allergen Reactions   • Cyclobenzaprine Hives   • Flucloxacillin Hives       Home Meds:    No current facility-administered medications on file prior to encounter.     Current Outpatient Medications on File Prior to Encounter   Medication Sig Dispense Refill   • amLODIPine (NORVASC) 10 MG tablet Take 10 mg by mouth Daily.     • atorvastatin (LIPITOR) 40 MG tablet Take 1 tablet by mouth Every Night. 90 tablet 0   • carvedilol (COREG) 25 MG tablet Take 25 mg by mouth 2 (Two) Times a Day With Meals.     • coenzyme Q10 100 MG capsule Take 100 mg by mouth Daily.     • finasteride (PROSCAR) 5 MG tablet Take 5 mg by mouth Daily.     • furosemide (LASIX) 20 MG tablet Take 20 mg by mouth As Needed.     • glimepiride (AMARYL) 4 MG tablet Take 1 tablet by mouth 2 (Two) Times a Day. 180 tablet 0   • hydrALAZINE (APRESOLINE) 50 MG tablet Take 50 mg by mouth Every 8 (Eight) Hours.     • metFORMIN (GLUCOPHAGE) 850 MG tablet TAKE 1/2 TABLET BY MOUTH TWO TIMES A DAY 90 tablet 0   • Multiple Vitamin (MULTI-VITAMIN DAILY PO) Take 1 tablet by mouth Daily.     • oxybutynin XL (DITROPAN-XL) 10 MG 24 hr tablet Take 10 mg by mouth Every Night.     • Ozempic, 0.25 or 0.5 MG/DOSE, 2 MG/1.5ML solution pen-injector INJECT 0.25MG SUBCUTANEOUSLY INTO THE APPROPRIATE AREA ONCE WEEKLY AS DIRECTED (Patient taking differently: Inject 0.25 mg under the skin into the appropriate area as directed 1 (One) Time Per Week. tuesdays) 1.5 mL 0   • Potassium Gluconate 2 MEQ  tablet Take 2 mEq by mouth Daily.     • tamsulosin (FLOMAX) 0.4 MG capsule 24 hr capsule Take 1 capsule by mouth Daily. 90 capsule 2   • valsartan (DIOVAN) 320 MG tablet Take 320 mg by mouth Daily.     • aspirin 81 MG tablet Take 81 mg by mouth Daily.     • glucose blood (OneTouch Verio) test strip USE TO CHECK BLOOD GLUCOSE ONCE DAILY OR AS DIRECTED 100 each 0   • glucose monitor monitoring kit 1 each As Needed (for diabetes). 1 each 0   • Lancets (ONETOUCH ULTRASOFT) lancets Check daily and prn 100 each 12   • nitroglycerin (NITROSTAT) 0.3 MG SL tablet Place 1 tablet under the tongue Every 5 (Five) Minutes As Needed for Chest Pain. Take no more than 3 doses in 15 minutes. 30 tablet 3       PMH:   Past Medical History:   Diagnosis Date   • Arthritis    • Benign prostatic hyperplasia    • CHF (congestive heart failure) ([facility])    • Colon polyps    • Coronary artery disease    • Coronary stent patent     x 5 stents    • Diabetes mellitus (HCC)    • Elevated cholesterol    • Hypertension    • Myocardial infarction (HCC) 2010    cardiac arrest   • Obesity    • Prostate hypertrophy    • Sleep apnea     cpap nightly     PSH:    Past Surgical History:   Procedure Laterality Date   • CARDIAC CATHETERIZATION     • CARDIAC DEFIBRILLATOR PLACEMENT  03/2010    Implanted after cardiac arrest per patient   • COLONOSCOPY     • CORONARY STENT PLACEMENT      5 stents placed at one time   • CYSTOSCOPY     • CYSTOSCOPY TRANSURETHRAL RESECTION OF PROSTATE N/A 03/02/2022    Procedure: GREENLIGHT LASER VAPORIZATION OF PROSTATE;  Surgeon: Todd Ponce MD;  Location: [facility];  Service: Urology;  Laterality: N/A;   • KNEE ARTHROSCOPY Right    • OTHER SURGICAL HISTORY      defibulator with pacemaker --[employer], Cardiologist Dr. Сергей Lees [facility]   • PACEMAKER IMPLANTATION  05/29/2019    battery change per patient   • REPLACEMENT TOTAL KNEE Left 11/17/2018   • VESICOSTOMY     • WISDOM TOOTH EXTRACTION      3 out 4 removed.   "      Immunization History:  Influenza: yes  Pneumococcal: no  Tetanus: no    Social History:   Tobacco:   Social History     Tobacco Use   Smoking Status Former   • Packs/day: 1.00   • Years: 25.00   • Pack years: 25.00   • Types: Cigarettes   • Quit date: 1996   • Years since quittin.9   Smokeless Tobacco Never   Tobacco Comments    quit 25 years ago      Alcohol:     Social History     Substance and Sexual Activity   Alcohol Use Never       Vitals:           /93 (BP Location: Right arm, Patient Position: Sitting)   Pulse 71   Temp 98.2 °F (36.8 °C) (Temporal)   Resp 18   Ht 185.4 cm (73\")   Wt 118 kg (260 lb)   SpO2 94%   BMI 34.30 kg/m²     Physical Exam:  General Appearance:    Alert, cooperative, no distress, appears stated age   Head:    Normocephalic, without obvious abnormality, atraumatic   Lungs:     Clear to auscultation bilaterally, respirations unlabored    Heart:   Regular rate and rhythm, S1 and S2 normal, no murmur, rub    or gallop    Abdomen:    Soft, nontender.  +bowel sounds   Breast Exam:    deferred   Genitalia:    deferred   Extremities:   Extremities normal, atraumatic, no cyanosis or edema   Skin:   Skin color, texture, turgor normal, no rashes or lesions   Neurologic:   Grossly intact   Results Review  LABS:  Lab Results   Component Value Date    WBC 6.84 2022    HGB 14.8 2022    HCT 43.7 2022    MCV 89.2 2022     2022    NEUTROABS 3.48 2021    GLUCOSE 94 2022    BUN 17 2022    CREATININE 0.92 2022    EGFRIFNONA 60 (L) 2021    EGFRIFAFRI 72 2021     2022    K 3.7 2022     2022    CO2 26.0 2022    CALCIUM 9.9 2022    ALBUMIN 4.30 09/15/2022    AST 27 09/15/2022    ALT 28 09/15/2022    BILITOT 0.5 09/15/2022    PTT 30.9 2022    INR 0.97 2022       RADIOLOGY:  No radiology results for the last 3 days     I reviewed the patient's new clinical " results.  Of note glucose 156 on 12/7/2022  Cancer Staging (if applicable)  Cancer Patient: __ yes __no __unknown; If yes, clinical stage T:__ N:__M:__, stage group or __N/A    Impression: 62 year old male presenting with benign prostatic hypertrophy    Plan: transurethral resection of prostate      KRISTEN Ortiz   12/07/22   10:44 AM EST

## 2022-12-12 DIAGNOSIS — E78.5 HYPERLIPIDEMIA, UNSPECIFIED HYPERLIPIDEMIA TYPE: ICD-10-CM

## 2022-12-12 DIAGNOSIS — Z76.0 MEDICATION REFILL: ICD-10-CM

## 2022-12-12 RX ORDER — ATORVASTATIN CALCIUM 40 MG/1
TABLET, FILM COATED ORAL
Qty: 90 TABLET | Refills: 0 | Status: SHIPPED | OUTPATIENT
Start: 2022-12-12 | End: 2023-02-01

## 2022-12-14 ENCOUNTER — OFFICE VISIT (OUTPATIENT)
Dept: FAMILY MEDICINE CLINIC | Facility: CLINIC | Age: 62
End: 2022-12-14

## 2022-12-14 ENCOUNTER — LAB (OUTPATIENT)
Dept: LAB | Facility: HOSPITAL | Age: 62
End: 2022-12-14

## 2022-12-14 VITALS
WEIGHT: 254 LBS | BODY MASS INDEX: 33.66 KG/M2 | HEART RATE: 74 BPM | RESPIRATION RATE: 16 BRPM | DIASTOLIC BLOOD PRESSURE: 80 MMHG | TEMPERATURE: 98.2 F | SYSTOLIC BLOOD PRESSURE: 132 MMHG | OXYGEN SATURATION: 96 % | HEIGHT: 73 IN

## 2022-12-14 DIAGNOSIS — E11.9 TYPE 2 DIABETES MELLITUS TREATED WITHOUT INSULIN: ICD-10-CM

## 2022-12-14 DIAGNOSIS — G47.00 INSOMNIA, UNSPECIFIED TYPE: ICD-10-CM

## 2022-12-14 DIAGNOSIS — E11.9 TYPE 2 DIABETES MELLITUS WITHOUT COMPLICATION, WITHOUT LONG-TERM CURRENT USE OF INSULIN: Primary | Chronic | ICD-10-CM

## 2022-12-14 LAB
EXPIRATION DATE: ABNORMAL
HBA1C MFR BLD: 7.7 %
HBA1C MFR BLD: 8 % (ref 4.8–5.6)
Lab: ABNORMAL

## 2022-12-14 PROCEDURE — 83036 HEMOGLOBIN GLYCOSYLATED A1C: CPT

## 2022-12-14 PROCEDURE — 99213 OFFICE O/P EST LOW 20 MIN: CPT | Performed by: NURSE PRACTITIONER

## 2022-12-14 PROCEDURE — 83036 HEMOGLOBIN GLYCOSYLATED A1C: CPT | Performed by: NURSE PRACTITIONER

## 2022-12-14 RX ORDER — HYDROXYZINE HYDROCHLORIDE 25 MG/1
25 TABLET, FILM COATED ORAL NIGHTLY PRN
Qty: 30 TABLET | Refills: 2 | Status: SHIPPED | OUTPATIENT
Start: 2022-12-14

## 2022-12-14 NOTE — PROGRESS NOTES
Follow Up Office Note     Patient Name: Omar Can  : 1960   MRN: [id number]     Chief Complaint:    Chief Complaint   Patient presents with   • Diabetes     A1C       History of Present Illness: Omar Can is a 62 y.o. male who presents today for reevaluation and management of chronic type 2 diabetes mellitus.  Patient's last A1c was 7.1 in September of this year.  Patient reports that he has been tolerating Ozempic well, however he has had difficulty getting the medication from the pharmacy as there is a national shortage.    Patient has new complaint of trouble sleeping. He states that this has been going on for the past 2 months. He states that he can fall asleep, but he has difficulty staying asleep.    Answers for HPI/ROS submitted by the patient on 2022  What is the primary reason for your visit?: Diabetes  Diabetes type: type 2  MedicAlert ID: Yes  Disease duration: 15 Years  blurred vision: Yes  foot paresthesias: No  foot ulcerations: No  visual change: No  weight loss: No  hunger: No  mood changes: No  sleepiness: Yes  sweats: No  blackouts: No  hospitalization: No  nocturnal hypoglycemia: No  required assistance: No  required glucagon: No  CVA: No  heart disease: Yes  impotence: No  nephropathy: No  peripheral neuropathy: No  PVD: No  retinopathy: No  CAD risks: dyslipidemia, family history, hypertension, obesity  Current treatments: diet, oral agent (triple therapy)  Treatment compliance: most of the time  Home blood tests: 1-2 x per day  Monitoring compliance: good  Blood glucose trend: fluctuating minimally  breakfast time: 6-7 am  breakfast glucose level: 140-180  dinner time: 5-6 pm  dinner glucose level: 140-180  Weight trend: fluctuating minimally  Current diet: generally healthy  Meal planning: avoidance of concentrated sweets  Exercise: intermittently  Dietitian visit: No  Eye exam current: Yes  Sees podiatrist: No      Subjective      I have reviewed and the  following portions of the patient's history were updated as appropriate: past family history, past medical history, past social history, past surgical history and problem list.    Review of Systems:   Review of Systems   Constitutional: Negative for fatigue.   Cardiovascular: Negative for chest pain.   Endocrine: Positive for polyuria. Negative for polydipsia and polyphagia.   Skin: Negative for pallor.   Neurological: Negative for dizziness, tremors, seizures, speech difficulty, weakness and headaches.   Psychiatric/Behavioral: Positive for sleep disturbance (trouble staying asleep). Negative for confusion. The patient is not nervous/anxious.         Past Medical History:   Past Medical History:   Diagnosis Date   • Arthritis    • Benign prostatic hyperplasia    • CHF (congestive heart failure) (HCC)    • Colon polyps    • Coronary artery disease    • Coronary stent patent     x 5 stents    • Diabetes mellitus (HCC)    • Elevated cholesterol    • Hypertension    • Myocardial infarction (HCC) 2010    cardiac arrest   • Obesity    • Prostate hypertrophy    • Sleep apnea     cpap nightly         Medications:     Current Outpatient Medications:   •  amLODIPine (NORVASC) 10 MG tablet, Take 10 mg by mouth Daily., Disp: , Rfl:   •  aspirin 81 MG tablet, Take 81 mg by mouth Daily., Disp: , Rfl:   •  atorvastatin (LIPITOR) 40 MG tablet, TAKE 1 TABLET BY MOUTH EVERY DAY AT NIGHT, Disp: 90 tablet, Rfl: 0  •  carvedilol (COREG) 25 MG tablet, Take 25 mg by mouth 2 (Two) Times a Day With Meals., Disp: , Rfl:   •  ciprofloxacin (Cipro) 500 MG tablet, Take 1 tablet by mouth 2 (Two) Times a Day., Disp: 28 tablet, Rfl: 0  •  coenzyme Q10 100 MG capsule, Take 100 mg by mouth Daily., Disp: , Rfl:   •  finasteride (PROSCAR) 5 MG tablet, Take 5 mg by mouth Daily., Disp: , Rfl:   •  furosemide (LASIX) 20 MG tablet, Take 20 mg by mouth As Needed., Disp: , Rfl:   •  glimepiride (AMARYL) 4 MG tablet, Take 1 tablet by mouth 2 (Two) Times a  Day., Disp: 180 tablet, Rfl: 0  •  glucose blood (OneTouch Verio) test strip, USE TO CHECK BLOOD GLUCOSE ONCE DAILY OR AS DIRECTED, Disp: 100 each, Rfl: 0  •  glucose monitor monitoring kit, 1 each As Needed (for diabetes)., Disp: 1 each, Rfl: 0  •  hydrALAZINE (APRESOLINE) 50 MG tablet, Take 50 mg by mouth Every 8 (Eight) Hours., Disp: , Rfl:   •  HYDROcodone-acetaminophen (NORCO)  MG per tablet, Take 1 tablet by mouth Every 6 (Six) Hours As Needed for Moderate Pain., Disp: 20 tablet, Rfl: 0  •  Lancets (ONETOUCH ULTRASOFT) lancets, Check daily and prn, Disp: 100 each, Rfl: 12  •  metFORMIN (GLUCOPHAGE) 850 MG tablet, TAKE 1/2 TABLET BY MOUTH TWO TIMES A DAY, Disp: 90 tablet, Rfl: 0  •  Multiple Vitamin (MULTI-VITAMIN DAILY PO), Take 1 tablet by mouth Daily., Disp: , Rfl:   •  nitroglycerin (NITROSTAT) 0.3 MG SL tablet, Place 1 tablet under the tongue Every 5 (Five) Minutes As Needed for Chest Pain. Take no more than 3 doses in 15 minutes., Disp: 30 tablet, Rfl: 3  •  oxybutynin XL (DITROPAN-XL) 10 MG 24 hr tablet, Take 10 mg by mouth Every Night., Disp: , Rfl:   •  Ozempic, 0.25 or 0.5 MG/DOSE, 2 MG/1.5ML solution pen-injector, INJECT 0.25MG SUBCUTANEOUSLY INTO THE APPROPRIATE AREA ONCE WEEKLY AS DIRECTED (Patient taking differently: Inject 0.25 mg under the skin into the appropriate area as directed 1 (One) Time Per Week. tuesdays), Disp: 1.5 mL, Rfl: 0  •  Potassium Gluconate 2 MEQ tablet, Take 2 mEq by mouth Daily., Disp: , Rfl:   •  tamsulosin (FLOMAX) 0.4 MG capsule 24 hr capsule, Take 1 capsule by mouth Daily., Disp: 90 capsule, Rfl: 2  •  valsartan (DIOVAN) 320 MG tablet, Take 320 mg by mouth Daily., Disp: , Rfl:   •  hydrOXYzine (ATARAX) 25 MG tablet, Take 1 tablet by mouth At Night As Needed (insomnia)., Disp: 30 tablet, Rfl: 2    Allergies:   Allergies   Allergen Reactions   • Cyclobenzaprine Hives   • Flucloxacillin Hives         Objective     Physical Exam:  Vital Signs:   Vitals:    12/14/22  "0824   BP: 132/80   Pulse: 74   Resp: 16   Temp: 98.2 °F (36.8 °C)   SpO2: 96%   Weight: 115 kg (254 lb)   Height: 185.4 cm (73\")     Body mass index is 33.51 kg/m².     Physical Exam  Vitals and nursing note reviewed.   Constitutional:       General: He is not in acute distress.     Appearance: Normal appearance. He is well-developed. He is not ill-appearing, toxic-appearing or diaphoretic.   HENT:      Head: Normocephalic and atraumatic.   Cardiovascular:      Rate and Rhythm: Normal rate and regular rhythm.   Pulmonary:      Effort: Pulmonary effort is normal. No respiratory distress.      Breath sounds: Normal breath sounds. No stridor. No wheezing.   Skin:     General: Skin is warm and dry.   Neurological:      General: No focal deficit present.      Mental Status: He is alert and oriented to person, place, and time.   Psychiatric:         Mood and Affect: Mood normal.         Behavior: Behavior normal. Behavior is cooperative.         Thought Content: Thought content normal.         Judgment: Judgment normal.         Assessment / Plan      Assessment/Plan:   Diagnoses and all orders for this visit:    1. Type 2 diabetes mellitus without complication, without long-term current use of insulin (HCC) (Primary)  Assessment & Plan:  Diabetes is stable.   Continue current treatment regimen.  Dietary recommendations for ADA diet.  Diabetes will be reassessed today. A1C per orders..    Orders:  -     Hemoglobin A1c; Future    2. Insomnia, unspecified type  Assessment & Plan:  Newly identified problem.  Begin hydroxyzine per orders.    Orders:  -     hydrOXYzine (ATARAX) 25 MG tablet; Take 1 tablet by mouth At Night As Needed (insomnia).  Dispense: 30 tablet; Refill: 2      Follow Up:   PRN and at next scheduled appointment(s) with PCP.    Discussed the nature of the medical condition(s) risks, complications, implications, management, safe and proper use of medications. Encouraged medication compliance, and keeping " scheduled follow up appointments with me and any other providers.      RTC if symptoms fail to improve, to ER if symptoms worsen.        *Dictated Utilizing Dragon Dictation   Please note that portions of this note were completed with a voice recognition program.   Part of this note may be an electronic transcription/translation of spoken language to printed text using the Dragon Dictation System. Spelling and/or grammatical errors may exist despite efforts at proofreading.        BRIDGET Carter  [facility]

## 2022-12-14 NOTE — PROGRESS NOTES
Sleep Clinic Video Visit Follow Up Note    You have chosen to receive care through a telehealth visit.  Do you consent to use a video/audio connection for your medical care today? Yes       Chief Complaint  Follow up and questions about PAP device    Subjective     History of Present Illness (from previous encounter on 8/3/2022):  Omar Can is a 62 y.o. male with a history of HTN, HL, ischemic heart disease s/p catheterization/CABG, and diabetes mellitus.  Patient has a diagnosis of obstructive sleep apnea and has been on CPAP since 2018.  He was originally evaluated in 2018 at [facility].  He denies use of tobacco, drinks less than monthly, and denies use of illicit drugs.  The patient drinks 2 cups of regular coffee, and 1 regular glass of tea daily.  Patient comes with his compliance report.  Patient does use his device 30/30 days, and 100% greater than 4 hours. He will sleep between 6-8 hours. He wakes 1-2 times per night.    The patient has been exceptionally compliant with CPAP.  Compliance notes 100% greater than 4 hours, and AHI of 2.9.  Settings: CPAP-C-flex plus, CPAP pressure 16 cm H2O, C-Flex setting to, ramp linear, ramp time 30 minutes, ramp start pressure 6 cm H2O (End copied text)    Interval History:  Omar Can is a 62 y.o. male who presents for follow-up to discuss replacement of PAP therapy device. He reports that he spoke with [facility] and was told that he needed a new Rx. He is having difficulty with the device, specifically the filters. He is needing a new device. He is having difficulty with [employer].    Further details are as follows:    [place] Scale is: 8/24    Weight:    Current Weight: 254 lbs      The patient's relevant past medical, surgical, family, and social history reviewed and updated in Epic as appropriate.    PMH:    Past Medical History:   Diagnosis Date   • Arthritis    • Benign prostatic hyperplasia    • CHF (congestive heart failure) (HCC)    • Colon polyps     • Coronary artery disease    • Coronary stent patent     x 5 stents    • Diabetes mellitus (HCC)    • Elevated cholesterol    • Hypertension    • Myocardial infarction (HCC) 2010    cardiac arrest   • Obesity    • Prostate hypertrophy    • Sleep apnea     cpap nightly     Past Surgical History:   Procedure Laterality Date   • CARDIAC CATHETERIZATION     • CARDIAC DEFIBRILLATOR PLACEMENT  03/2010    Implanted after cardiac arrest per patient   • COLONOSCOPY     • CORONARY STENT PLACEMENT      5 stents placed at one time   • CYSTOSCOPY     • CYSTOSCOPY TRANSURETHRAL RESECTION OF PROSTATE N/A 03/02/2022    Procedure: GREENLIGHT LASER VAPORIZATION OF PROSTATE;  Surgeon: Todd Ponce MD;  Location:  [facility];  Service: Urology;  Laterality: N/A;   • CYSTOSCOPY TRANSURETHRAL RESECTION OF PROSTATE N/A 12/07/2022    Procedure: TRANSURETHRAL RESECTION OF PROSTATE WITH GREENLIGHT LASER;  Surgeon: Todd Ponce MD;  Location:  [facility];  Service: Urology;  Laterality: N/A;   • JOINT REPLACEMENT  2018   • KNEE ARTHROSCOPY Right    • OTHER SURGICAL HISTORY      defibulator with pacemaker --[employer], Cardiologist Dr. Сергей Lees [facility]   • PACEMAKER IMPLANTATION  05/29/2019    battery change per patient   • PROSTATE SURGERY  2022    in march and dec   • REPLACEMENT TOTAL KNEE Left 11/17/2018   • VASECTOMY  1985   • VESICOSTOMY     • WISDOM TOOTH EXTRACTION      3 out 4 removed.        Allergies   Allergen Reactions   • Cyclobenzaprine Hives   • Flucloxacillin Hives       MEDS:  Prior to Admission medications    Medication Sig Start Date End Date Taking? Authorizing Provider   amLODIPine (NORVASC) 10 MG tablet Take 10 mg by mouth Daily. 2/1/22   ProviderTalat MD   aspirin 81 MG tablet Take 81 mg by mouth Daily.    ProviderTalat MD   atorvastatin (LIPITOR) 40 MG tablet TAKE 1 TABLET BY MOUTH EVERY DAY AT NIGHT 12/12/22   Diana Pardo APRN   carvedilol (COREG) 25 MG tablet Take 25 mg by mouth  2 (Two) Times a Day With Meals.    Talat Solis MD   ciprofloxacin (Cipro) 500 MG tablet Take 1 tablet by mouth 2 (Two) Times a Day. 12/7/22   Todd Ponce MD   coenzyme Q10 100 MG capsule Take 100 mg by mouth Daily.    Talat Solis MD   finasteride (PROSCAR) 5 MG tablet Take 5 mg by mouth Daily. 7/7/21   Talat Solis MD   furosemide (LASIX) 20 MG tablet Take 20 mg by mouth As Needed. 12/16/20   Talat Solis MD   glimepiride (AMARYL) 4 MG tablet Take 1 tablet by mouth 2 (Two) Times a Day. 11/9/22   Diana Pardo APRN   glucose blood (OneTouch Verio) test strip USE TO CHECK BLOOD GLUCOSE ONCE DAILY OR AS DIRECTED 10/20/22   Diana Pardo APRN   glucose monitor monitoring kit 1 each As Needed (for diabetes). 1/3/20   Cristina Davis APRN   hydrALAZINE (APRESOLINE) 50 MG tablet Take 50 mg by mouth Every 8 (Eight) Hours.    Talat Solis MD   HYDROcodone-acetaminophen (NORCO)  MG per tablet Take 1 tablet by mouth Every 6 (Six) Hours As Needed for Moderate Pain. 12/7/22   Todd Ponce MD   hydrOXYzine (ATARAX) 25 MG tablet Take 1 tablet by mouth At Night As Needed (insomnia). 12/14/22   Diana Pardo APRN   Lancets (ONETOUCH ULTRASOFT) lancets Check daily and prn 1/3/20   Cristina Davis APRN   metFORMIN (GLUCOPHAGE) 850 MG tablet TAKE 1/2 TABLET BY MOUTH TWO TIMES A DAY 8/2/22   Diana Pardo APRN   Multiple Vitamin (MULTI-VITAMIN DAILY PO) Take 1 tablet by mouth Daily.    Talat Solis MD   nitroglycerin (NITROSTAT) 0.3 MG SL tablet Place 1 tablet under the tongue Every 5 (Five) Minutes As Needed for Chest Pain. Take no more than 3 doses in 15 minutes. 7/12/22   Brayan Epps MD   oxybutynin XL (DITROPAN-XL) 10 MG 24 hr tablet Take 10 mg by mouth Every Night.    Talat Solis MD   Ozempic, 0.25 or 0.5 MG/DOSE, 2 MG/1.5ML solution pen-injector INJECT 0.25MG SUBCUTANEOUSLY INTO THE APPROPRIATE AREA ONCE WEEKLY AS  "DIRECTED  Patient taking differently: Inject 0.25 mg under the skin into the appropriate area as directed 1 (One) Time Per Week. tuesdays 11/15/22   Diana Pardo APRN   Potassium Gluconate 2 MEQ tablet Take 2 mEq by mouth Daily.    Provider, MD Talat   tamsulosin (FLOMAX) 0.4 MG capsule 24 hr capsule Take 1 capsule by mouth Daily. 9/14/22   Diana Pardo APRN   valsartan (DIOVAN) 320 MG tablet Take 320 mg by mouth Daily. 2/1/22   Provider, MD Talat         FH:  Family History   Problem Relation Age of Onset   • Hypertension Mother    • Heart failure Mother    • Stroke Mother    • Diabetes Mother    • Heart disease Mother    • Heart attack Father    • Diabetes Father    • Heart disease Father    • Vision loss Father    • No Known Problems Sister    • No Known Problems Sister    • Leukemia Sister    • Cerebral palsy Brother    • No Known Problems Brother    • No Known Problems Brother    • No Known Problems Daughter    • Diabetes Son        Objective   Vital Signs:  Ht 185.4 cm (72.99\")   Wt 115 kg (254 lb)   BMI 33.52 kg/m²           Physical Exam  Constitutional:       Appearance: Normal appearance.   Neurological:      General: No focal deficit present.      Mental Status: He is alert and oriented to person, place, and time.   Psychiatric:         Mood and Affect: Mood normal.         Behavior: Behavior normal.             Result Review :              Assessment and Plan  Omar Can is a 62 y.o. male who returns for follow up. His device is not functioning properly and has also been told that he needs an order for a new device from [employer].  Patient owns his current device.  I will place an order for new device and supplies.  I have discussed that the patient may be required to have another home sleep test and if so I will order this and we will proceed from there anticipating an order for new device and supplies at that time.  Otherwise he will return for follow-up compliance in " 31-90 days.  Patient is anxious to get a new device as he is having difficulty with sleeping.  He recently had a procedure and was found to have desaturations which improved after PAP therapy was initiated.    Diagnoses and all orders for this visit:    1. Obstructive sleep apnea, adult (Primary)  -     PAP Therapy           Follow Up  Return for 31 to 90 days after PAP setup.  Patient was given instructions and counseling regarding his condition or for health maintenance advice. Please see specific information pulled into the AVS if appropriate.       BRIDGET Wells, [facility]-BC  Pulmonology, Critical Care, and Sleep Medicine  Electronically signed by BRIDGET Brown, 12/15/22

## 2022-12-15 ENCOUNTER — TELEMEDICINE (OUTPATIENT)
Dept: SLEEP MEDICINE | Facility: HOSPITAL | Age: 62
End: 2022-12-15

## 2022-12-15 VITALS — HEIGHT: 73 IN | BODY MASS INDEX: 33.66 KG/M2 | WEIGHT: 254 LBS

## 2022-12-15 DIAGNOSIS — G47.33 OBSTRUCTIVE SLEEP APNEA, ADULT: Primary | ICD-10-CM

## 2022-12-15 PROBLEM — G47.00 INSOMNIA: Status: ACTIVE | Noted: 2022-12-15

## 2022-12-15 PROCEDURE — 99213 OFFICE O/P EST LOW 20 MIN: CPT | Performed by: NURSE PRACTITIONER

## 2022-12-15 NOTE — ASSESSMENT & PLAN NOTE
Diabetes is stable.   Continue current treatment regimen.  Dietary recommendations for ADA diet.  Diabetes will be reassessed today. A1C per orders..

## 2022-12-19 ENCOUNTER — TELEPHONE (OUTPATIENT)
Dept: SLEEP MEDICINE | Facility: HOSPITAL | Age: 62
End: 2022-12-19

## 2022-12-19 NOTE — TELEPHONE ENCOUNTER
PATIENT CALLED STATING THAT [employer] IS NOT LONGER ACCEPTING HIS INS TO COVER THE RECENT SUPPLY ORDER.    PATIENT WILL CALL OFFICE BACK AND GIVE US A DME CO HE WANTS TO HAVE SUPPLY ORDER SENT TO AFTER HE SPEAKS WITH INS CO TO GET LIST OF IN NETWORK PROVIDERS.

## 2022-12-19 NOTE — TELEPHONE ENCOUNTER
Hub staff attempted to follow warm transfer process and was unsuccessful     Caller: Omar Can    Relationship to patient: Self    Best call back number: [phone]    Patient is needing: PATIENT HAS SPOKE WITH INSURANCE COMPANY. PATIENT WOULD LIKE FOR SUPPLY ORDERS TO BE SENT TO [employer].   PHONE NUMBER FOR [employer]: [phone]. NO FAX NUMBER PROVIDED.

## 2023-01-05 DIAGNOSIS — N18.30 STAGE 3 CHRONIC KIDNEY DISEASE: ICD-10-CM

## 2023-01-05 DIAGNOSIS — E11.9 TYPE 2 DIABETES MELLITUS TREATED WITHOUT INSULIN: ICD-10-CM

## 2023-01-05 RX ORDER — SEMAGLUTIDE 1.34 MG/ML
INJECTION, SOLUTION SUBCUTANEOUS
Qty: 1.5 ML | Refills: 0 | Status: SHIPPED | OUTPATIENT
Start: 2023-01-05 | End: 2023-02-09 | Stop reason: SDUPTHER

## 2023-01-09 DIAGNOSIS — E11.9 TYPE 2 DIABETES MELLITUS TREATED WITHOUT INSULIN: ICD-10-CM

## 2023-01-09 DIAGNOSIS — Z76.0 MEDICATION REFILL: ICD-10-CM

## 2023-01-09 DIAGNOSIS — N18.30 STAGE 3 CHRONIC KIDNEY DISEASE: ICD-10-CM

## 2023-01-09 NOTE — TELEPHONE ENCOUNTER
Rx Refill Note  Requested Prescriptions     Pending Prescriptions Disp Refills   • metFORMIN (GLUCOPHAGE) 850 MG tablet 90 tablet 0     Sig: Take 0.5 tablets by mouth 2 (Two) Times a Day.   • Semaglutide,0.25 or 0.5MG/DOS, (Ozempic, 0.25 or 0.5 MG/DOSE,) 2 MG/1.5ML solution pen-injector 1.5 mL 0      Last office visit with prescribing clinician: 12/14/2022   Last telemedicine visit with prescribing clinician: Visit date not found   Next office visit with prescribing clinician: 9/21/2023                         Would you like a call back once the refill request has been completed: [] Yes [] No    If the office needs to give you a call back, can they leave a voicemail: [] Yes [] No    Sandrita Hernandez MA  01/09/23, 16:25 EST

## 2023-01-10 RX ORDER — SEMAGLUTIDE 1.34 MG/ML
INJECTION, SOLUTION SUBCUTANEOUS
Qty: 1.5 ML | Refills: 0 | OUTPATIENT
Start: 2023-01-10

## 2023-01-17 ENCOUNTER — OFFICE VISIT (OUTPATIENT)
Dept: CARDIOLOGY | Facility: CLINIC | Age: 63
End: 2023-01-17
Payer: MEDICARE

## 2023-01-17 VITALS
HEART RATE: 74 BPM | HEIGHT: 73 IN | SYSTOLIC BLOOD PRESSURE: 130 MMHG | DIASTOLIC BLOOD PRESSURE: 80 MMHG | BODY MASS INDEX: 34.46 KG/M2 | WEIGHT: 260 LBS | OXYGEN SATURATION: 96 %

## 2023-01-17 DIAGNOSIS — Z95.810 BIVENTRICULAR IMPLANTABLE CARDIOVERTER-DEFIBRILLATOR (ICD) IN SITU: Chronic | ICD-10-CM

## 2023-01-17 DIAGNOSIS — I25.10 CORONARY ARTERY DISEASE INVOLVING NATIVE CORONARY ARTERY OF NATIVE HEART WITHOUT ANGINA PECTORIS: Chronic | ICD-10-CM

## 2023-01-17 DIAGNOSIS — I48.0 PAROXYSMAL ATRIAL FIBRILLATION: Primary | ICD-10-CM

## 2023-01-17 PROCEDURE — 93284 PRGRMG EVAL IMPLANTABLE DFB: CPT | Performed by: PHYSICIAN ASSISTANT

## 2023-01-17 PROCEDURE — 99214 OFFICE O/P EST MOD 30 MIN: CPT | Performed by: PHYSICIAN ASSISTANT

## 2023-01-17 RX ORDER — TRIMETHOPRIM 100 MG/1
100 TABLET ORAL EVERY 12 HOURS
COMMUNITY
Start: 2023-01-10 | End: 2023-02-09

## 2023-01-17 RX ORDER — NITROGLYCERIN 0.3 MG/1
0.3 TABLET SUBLINGUAL
Qty: 30 TABLET | Refills: 1 | Status: SHIPPED | OUTPATIENT
Start: 2023-01-17

## 2023-01-17 NOTE — PROGRESS NOTES
[facility]   OFFICE NOTE      Omar Can  1960  PCP: Diana Pardo APRN    SUBJECTIVE:   Omar Can is a 62 y.o. male seen for a follow up visit regarding the following:     CC:IHD    HPI:   Pleasant 62-year-old male presents today for follow-up regarding ischemic cardiomyopathy, Medtronic bivicd, hypertension dyslipidemia. Since we last saw the pt, pt denies any palpitation episodes, SOB, CP, LH, and dizziness.  Denies any ICD shocks denies any hospitalizations, ER visits, bleeding, or TIA/CVA symptoms. Overall feels well.  He did have another prostate procedure with a laser he states she still recovering from this.    Cardiac PMH: (Old records have been reviewed and summarized below)  1. Ischemic heart disease  a. Cardiac arrest admission to [facility] March 26, 2010 with left heart catheterization by Dr. MIKE with emergent catheter-based intervention.  (Database insufficiency)  b. Repeat left heart catheterization 48 hours with catheter-based invention 2 stents other vessels (database insufficiency)  c. MDT BIVICD placed 2010 [facility] (database insufficiency ) Dr. Quiroga  d. Medtronic BIVIICD generator change November 7, 2019  e. Echocardiogram May 20, 2021 LVH EF 60% mild AI  f. Normal myocardial perfusion study May 20, 2021 EF calculated 42%.  2. New onset A. fib: Documented by interrogation today August 2021 episode lasted 13 hours.  3. Hypertension  4. Hyperlipidemia  5. BPH, recent cystoscope and recent scope with Laser for prostate. Dr. Ponce  6. Diabetes mellitus type 2  7. Obstructive sleep apnea, Compliance with CPAP    Past Medical History, Past Surgical History, Family history, Social History, and Medications were all reviewed with the patient today and updated as necessary.       Current Outpatient Medications:   •  amLODIPine (NORVASC) 10 MG tablet, Take 10 mg by mouth Daily., Disp: , Rfl:   •  aspirin 81 MG  tablet, Take 81 mg by mouth Daily., Disp: , Rfl:   •  atorvastatin (LIPITOR) 40 MG tablet, TAKE 1 TABLET BY MOUTH EVERY DAY AT NIGHT, Disp: 90 tablet, Rfl: 0  •  carvedilol (COREG) 25 MG tablet, Take 25 mg by mouth 2 (Two) Times a Day With Meals., Disp: , Rfl:   •  ciprofloxacin (Cipro) 500 MG tablet, Take 1 tablet by mouth 2 (Two) Times a Day., Disp: 28 tablet, Rfl: 0  •  coenzyme Q10 100 MG capsule, Take 100 mg by mouth Daily., Disp: , Rfl:   •  furosemide (LASIX) 20 MG tablet, Take 20 mg by mouth As Needed., Disp: , Rfl:   •  glimepiride (AMARYL) 4 MG tablet, Take 1 tablet by mouth 2 (Two) Times a Day., Disp: 180 tablet, Rfl: 0  •  glucose blood (OneTouch Verio) test strip, USE TO CHECK BLOOD GLUCOSE ONCE DAILY OR AS DIRECTED, Disp: 100 each, Rfl: 0  •  glucose monitor monitoring kit, 1 each As Needed (for diabetes)., Disp: 1 each, Rfl: 0  •  hydrALAZINE (APRESOLINE) 50 MG tablet, Take 50 mg by mouth Every 8 (Eight) Hours., Disp: , Rfl:   •  HYDROcodone-acetaminophen (NORCO)  MG per tablet, Take 1 tablet by mouth Every 6 (Six) Hours As Needed for Moderate Pain., Disp: 20 tablet, Rfl: 0  •  hydrOXYzine (ATARAX) 25 MG tablet, Take 1 tablet by mouth At Night As Needed (insomnia)., Disp: 30 tablet, Rfl: 2  •  Lancets (ONETOUCH ULTRASOFT) lancets, Check daily and prn, Disp: 100 each, Rfl: 12  •  metFORMIN (GLUCOPHAGE) 850 MG tablet, Take 0.5 tablets by mouth 2 (Two) Times a Day., Disp: 90 tablet, Rfl: 0  •  Multiple Vitamin (MULTI-VITAMIN DAILY PO), Take 1 tablet by mouth Daily., Disp: , Rfl:   •  nitroglycerin (NITROSTAT) 0.3 MG SL tablet, Place 1 tablet under the tongue Every 5 (Five) Minutes As Needed for Chest Pain. Take no more than 3 doses in 15 minutes., Disp: 30 tablet, Rfl: 3  •  oxybutynin XL (DITROPAN-XL) 10 MG 24 hr tablet, Take 10 mg by mouth Every Night., Disp: , Rfl:   •  Ozempic, 0.25 or 0.5 MG/DOSE, 2 MG/1.5ML solution pen-injector, INJECT 0.25MG UNDER THE SKIN WEEKLY AS DIRECTED, Disp: 1.5 mL,  "Rfl: 0  •  Potassium Gluconate 2 MEQ tablet, Take 2 mEq by mouth Daily., Disp: , Rfl:   •  tamsulosin (FLOMAX) 0.4 MG capsule 24 hr capsule, Take 1 capsule by mouth Daily., Disp: 90 capsule, Rfl: 2  •  valsartan (DIOVAN) 320 MG tablet, Take 320 mg by mouth Daily., Disp: , Rfl:   •  apixaban (ELIQUIS) 5 MG tablet tablet, Take 1 tablet by mouth Every 12 (Twelve) Hours., Disp: 60 tablet, Rfl: 6  •  finasteride (PROSCAR) 5 MG tablet, Take 5 mg by mouth Daily., Disp: , Rfl:   •  trimethoprim (TRIMPEX) 100 MG tablet, Take 100 mg by mouth Every 12 (Twelve) Hours., Disp: , Rfl:        Allergies   Allergen Reactions   • Cyclobenzaprine Hives   • Flucloxacillin Hives         PHYSICAL EXAM:    /80 (BP Location: Right arm, Patient Position: Sitting)   Pulse 74   Ht 185.4 cm (73\")   Wt 118 kg (260 lb)   SpO2 96%   BMI 34.30 kg/m²        Wt Readings from Last 5 Encounters:   01/17/23 118 kg (260 lb)   12/15/22 115 kg (254 lb)   12/14/22 115 kg (254 lb)   12/07/22 118 kg (260 lb)   09/14/22 113 kg (249 lb)       BP Readings from Last 5 Encounters:   01/17/23 130/80   12/14/22 132/80   12/07/22 142/83   09/14/22 122/78   08/03/22 137/73       General appearance - Alert, well appearing, and in no distress   Mental status - Affect appropriate to mood.  Eyes - Sclerae anicteric,  ENMT - Hearing grossly normal bilaterally, Dental hygiene good.  Neck - Carotids upstroke normal bilaterally, no bruits, no JVD.  Resp - Clear to auscultation, no wheezes, rales or rhonchi, symmetric air entry.  Heart - Normal rate, regular rhythm, normal S1, S2, no murmurs, rubs, clicks or gallops.  GI - Soft, nontender, nondistended, no masses or organomegaly.  Neurological - Grossly intact - normal speech, no focal findings  Musculoskeletal - No joint tenderness, deformity or swelling, no muscular tenderness noted.  Extremities - Peripheral pulses normal, no pedal edema, no clubbing or cyanosis.  Skin - Normal coloration and turgor.  Psych -  " oriented to person, place, and time.    Medical problems and test results were reviewed with the patient today.     No results found for this or any previous visit (from the past 672 hour(s)).        Device Interrogation:  Please see device interrogation form which has been signed and updated for full details.  Patient Medtronic bivicd.  DDDR 70.  A paced 90%.  BiV paced 9 9.9%.  P wave 2.8 mV.  Patient's dependent rate at 40.  Atrial lead threshold 0.5 V at 0.4 ms.  RV threshold 0.75 V at 0.4 ms.  LV threshold 1.5 V at 0.4 ms.  Atrial impedance 300 ohms.  RV impedance 399 ohms.  Charge impedance 860 ohms.  SVC and points greater than 200 (SVC coil is off) battery voltage 3.1 years.  1 episode of A. fib lasting 13 hours August 2021.    ASSESSMENT   1.  Ischemic cardiomyopathy: Continue guideline directed medical therapy.  2.  Medtronic bivicd: Normal interrogation today.  Battery voltage 3.1 years.  SVC impedance greater than 200, SVC coil was turned off.  This has been known in the past previously settings per [facility] cardiology.  Otherwise device function normal with battery life 3.1 years.  3.  Hypertension: Controlled on current medical therapy  4.  Hyperlipidemia: Statin therapy  5.  Episode of atrial fibrillation August 2021: Episode lasted for 13 hours.  Chadsvasc=4. Recommend Eliquis Rx. .  Initiate Eliquis 5 mg twice daily.      PLAN  · Episode of A. fib August lasting over 13 hours.  Discussed that he is higher risk with elevated chads Vascor 4.  He would like to start Eliquis 5 mg twice daily.  · Continue other current medications  · Return follow-up her office in 6 months or sooner as needed.        1/17/2023  11:00 IGGY Magana PA-C

## 2023-01-20 ENCOUNTER — PRIOR AUTHORIZATION (OUTPATIENT)
Dept: FAMILY MEDICINE CLINIC | Facility: CLINIC | Age: 63
End: 2023-01-20
Payer: MEDICARE

## 2023-01-20 NOTE — TELEPHONE ENCOUNTER
PA started 01/20/2023      Omar Can   (Key: [id number])  Rx #: [id number]  Ozempic (0.25 or 0.5 MG/DOSE) 2MG/1.5ML pen-injectors

## 2023-01-26 ENCOUNTER — PRIOR AUTHORIZATION (OUTPATIENT)
Dept: FAMILY MEDICINE CLINIC | Facility: CLINIC | Age: 63
End: 2023-01-26
Payer: MEDICARE

## 2023-01-26 NOTE — TELEPHONE ENCOUNTER
Hub and front can read    Spoke with the pt to ask him if he has Medicare D bc the cover my meds is telling us he needs to have Medicare D to be approved his ozempic. He advised he has A and B  and he is going to try to update his insurance information on my-chart or bring us the copy.  He told me his has [employer] and [employer]. I told him we only have the united on file and that we need the other card so I can request the PA thru that insurance    The pt advised me that the Pharmacy told him they were completely out of stock at his pharmacy and they put him on a waiting list

## 2023-01-26 NOTE — TELEPHONE ENCOUNTER
PA started 01/26/2023      Omar Can   (Key: [id number])  Ozempic (0.25 or 0.5 MG/DOSE) 2MG/1.5ML pen-injectors

## 2023-01-28 DIAGNOSIS — Z76.0 MEDICATION REFILL: ICD-10-CM

## 2023-01-28 DIAGNOSIS — E11.9 TYPE 2 DIABETES MELLITUS TREATED WITHOUT INSULIN: ICD-10-CM

## 2023-01-28 RX ORDER — TRIMETHOPRIM 100 MG/1
TABLET ORAL EVERY 12 HOURS
COMMUNITY
End: 2023-02-09

## 2023-01-30 RX ORDER — NITROGLYCERIN 0.3 MG/1
TABLET SUBLINGUAL
Qty: 100 TABLET | Refills: 0 | OUTPATIENT
Start: 2023-01-30

## 2023-02-01 DIAGNOSIS — Z76.0 MEDICATION REFILL: ICD-10-CM

## 2023-02-01 DIAGNOSIS — E78.5 HYPERLIPIDEMIA, UNSPECIFIED HYPERLIPIDEMIA TYPE: ICD-10-CM

## 2023-02-01 RX ORDER — ATORVASTATIN CALCIUM 40 MG/1
TABLET, FILM COATED ORAL
Qty: 90 TABLET | Refills: 0 | Status: SHIPPED | OUTPATIENT
Start: 2023-02-01

## 2023-02-04 DIAGNOSIS — Z76.0 MEDICATION REFILL: ICD-10-CM

## 2023-02-04 DIAGNOSIS — E11.9 TYPE 2 DIABETES MELLITUS TREATED WITHOUT INSULIN: ICD-10-CM

## 2023-02-09 ENCOUNTER — OFFICE VISIT (OUTPATIENT)
Dept: FAMILY MEDICINE CLINIC | Facility: CLINIC | Age: 63
End: 2023-02-09
Payer: MEDICARE

## 2023-02-09 ENCOUNTER — LAB (OUTPATIENT)
Dept: LAB | Facility: HOSPITAL | Age: 63
End: 2023-02-09
Payer: MEDICARE

## 2023-02-09 VITALS
HEART RATE: 83 BPM | HEIGHT: 73 IN | TEMPERATURE: 98.4 F | SYSTOLIC BLOOD PRESSURE: 132 MMHG | BODY MASS INDEX: 33.8 KG/M2 | DIASTOLIC BLOOD PRESSURE: 78 MMHG | OXYGEN SATURATION: 95 % | WEIGHT: 255 LBS

## 2023-02-09 DIAGNOSIS — U07.1 COVID-19: Primary | ICD-10-CM

## 2023-02-09 DIAGNOSIS — Z20.822 CLOSE EXPOSURE TO COVID-19 VIRUS: ICD-10-CM

## 2023-02-09 DIAGNOSIS — E11.65 TYPE 2 DIABETES MELLITUS WITH HYPERGLYCEMIA, WITHOUT LONG-TERM CURRENT USE OF INSULIN: ICD-10-CM

## 2023-02-09 LAB
EXPIRATION DATE: ABNORMAL
FLUAV RNA RESP QL NAA+PROBE: NEGATIVE
FLUBV RNA RESP QL NAA+PROBE: NEGATIVE
INTERNAL CONTROL: ABNORMAL
Lab: ABNORMAL
SARS-COV-2 RNA RESP QL NAA+PROBE: DETECTED

## 2023-02-09 PROCEDURE — U0004 COV-19 TEST NON-CDC HGH THRU: HCPCS

## 2023-02-09 PROCEDURE — 87636 SARSCOV2 & INF A&B AMP PRB: CPT | Performed by: NURSE PRACTITIONER

## 2023-02-09 PROCEDURE — 99214 OFFICE O/P EST MOD 30 MIN: CPT | Performed by: NURSE PRACTITIONER

## 2023-02-09 RX ORDER — SEMAGLUTIDE 1.34 MG/ML
0.25 INJECTION, SOLUTION SUBCUTANEOUS WEEKLY
Qty: 3 ML | Refills: 5 | Status: SHIPPED | OUTPATIENT
Start: 2023-02-09 | End: 2023-03-23

## 2023-02-09 NOTE — ASSESSMENT & PLAN NOTE
Diabetes is improving with treatment, but remains above goal of less than 7.   Continue current treatment regimen.  Dietary recommendations for ADA diet.  Regular aerobic exercise.  Diabetes will be reassessed in 3 months.  I personally called [facility] and confirmed that they had Ozempic 0.25 mg pens in stock. Patient agreeable to transfer prescription to [facility].

## 2023-02-09 NOTE — PROGRESS NOTES
Follow Up Office Note     Patient Name: Omar Can  : 1960   MRN: [id number]     Chief Complaint:    Chief Complaint   Patient presents with   • Exposure To Known Illness     Per patient his wife tested positive for COVID this morning; no symptoms.    • Nasal Congestion     Per patient his drainage is clear; started Tuesday.    • Diabetes       History of Present Illness: Omar Can is a 62 y.o. male who presents today for Covid-19 testing secondary to exposure. Patient states that he and his wife began having a runny nose 2 days ago. Since this time she has developed a cough and was diagnosed with Covid-19 today. Patient denies fever, chills, body aches, loss of taste or smell, cough, SOA or any other symptoms aside from runny nose.     Patient also presents today for re-evaluation/managment chronic DM2. Patient's last A1C was 7.7 (22). Patient states that he has had a difficult time obtaining his Ozempic from the pharmacy due to supply chain issues. He would like to discuss options today.    Subjective      I have reviewed and the following portions of the patient's history were updated as appropriate: past family history, past medical history, past social history, past surgical history and problem list.    Review of Systems:   Review of Systems   Constitutional: Negative.    HENT: Positive for congestion and rhinorrhea.    Respiratory: Negative.    Cardiovascular: Negative.    Gastrointestinal: Negative.    Neurological: Negative.         Past Medical History:   Past Medical History:   Diagnosis Date   • Arthritis    • Benign prostatic hyperplasia    • CHF (congestive heart failure) (HCC)    • Colon polyps    • Coronary artery disease    • Coronary stent patent     x 5 stents    • Diabetes mellitus (HCC)    • Elevated cholesterol    • Hypertension    • Myocardial infarction (HCC) 2010    cardiac arrest   • Obesity    • Paroxysmal atrial fibrillation (HCC) 2023   • Prostate  hypertrophy    • Sleep apnea     cpap nightly         Medications:     Current Outpatient Medications:   •  amLODIPine (NORVASC) 10 MG tablet, Take 10 mg by mouth Daily., Disp: , Rfl:   •  apixaban (ELIQUIS) 5 MG tablet tablet, Take 1 tablet by mouth Every 12 (Twelve) Hours., Disp: 60 tablet, Rfl: 6  •  aspirin 81 MG tablet, Take 81 mg by mouth Daily., Disp: , Rfl:   •  atorvastatin (LIPITOR) 40 MG tablet, TAKE 1 TABLET BY MOUTH EVERY DAY AT NIGHT, Disp: 90 tablet, Rfl: 0  •  carvedilol (COREG) 25 MG tablet, Take 25 mg by mouth 2 (Two) Times a Day With Meals., Disp: , Rfl:   •  Semaglutide,0.25 or 0.5MG/DOS, (Ozempic, 0.25 or 0.5 MG/DOSE,) 2 MG/1.5ML solution pen-injector, Inject 0.25 mg under the skin into the appropriate area as directed 1 (One) Time Per Week., Disp: 3 mL, Rfl: 5  •  coenzyme Q10 100 MG capsule, Take 100 mg by mouth Daily., Disp: , Rfl:   •  finasteride (PROSCAR) 5 MG tablet, Take 5 mg by mouth Daily., Disp: , Rfl:   •  furosemide (LASIX) 20 MG tablet, Take 20 mg by mouth As Needed., Disp: , Rfl:   •  glimepiride (AMARYL) 4 MG tablet, Take 1 tablet by mouth 2 (Two) Times a Day., Disp: 180 tablet, Rfl: 0  •  glucose blood (OneTouch Verio) test strip, USE TO CHECK BLOOD GLUCOSE ONCE DAILY OR AS DIRECTED, Disp: 100 each, Rfl: 0  •  glucose monitor monitoring kit, 1 each As Needed (for diabetes)., Disp: 1 each, Rfl: 0  •  hydrALAZINE (APRESOLINE) 50 MG tablet, Take 50 mg by mouth Every 8 (Eight) Hours., Disp: , Rfl:   •  HYDROcodone-acetaminophen (NORCO)  MG per tablet, Take 1 tablet by mouth Every 6 (Six) Hours As Needed for Moderate Pain., Disp: 20 tablet, Rfl: 0  •  hydrOXYzine (ATARAX) 25 MG tablet, Take 1 tablet by mouth At Night As Needed (insomnia)., Disp: 30 tablet, Rfl: 2  •  Lancets (ONETOUCH ULTRASOFT) lancets, Check daily and prn, Disp: 100 each, Rfl: 12  •  metFORMIN (GLUCOPHAGE) 850 MG tablet, TAKE 1/2 TABLET BY MOUTH TWO TIMES A DAY, Disp: 90 tablet, Rfl: 0  •  Multiple Vitamin  "(MULTI-VITAMIN DAILY PO), Take 1 tablet by mouth Daily., Disp: , Rfl:   •  nitroglycerin (NITROSTAT) 0.3 MG SL tablet, Place 1 tablet under the tongue Every 5 (Five) Minutes As Needed for Chest Pain. Take no more than 3 doses in 15 minutes., Disp: 30 tablet, Rfl: 1  •  oxybutynin XL (DITROPAN-XL) 10 MG 24 hr tablet, Take 10 mg by mouth Every Night., Disp: , Rfl:   •  Potassium Gluconate 2 MEQ tablet, Take 2 mEq by mouth Daily., Disp: , Rfl:   •  tamsulosin (FLOMAX) 0.4 MG capsule 24 hr capsule, Take 1 capsule by mouth Daily., Disp: 90 capsule, Rfl: 2  •  valsartan (DIOVAN) 320 MG tablet, Take 320 mg by mouth Daily., Disp: , Rfl:     Allergies:   Allergies   Allergen Reactions   • Cyclobenzaprine Hives   • Flucloxacillin Hives         Objective     Physical Exam:  Vital Signs:   Vitals:    02/09/23 1103   BP: 132/78   Pulse: 83   Temp: 98.4 °F (36.9 °C)   TempSrc: Infrared   SpO2: 95%   Weight: 116 kg (255 lb)   Height: 185.4 cm (72.99\")   PainSc: 0-No pain     Body mass index is 33.65 kg/m².     Physical Exam  Vitals and nursing note reviewed.   Constitutional:       General: He is not in acute distress.     Appearance: Normal appearance. He is well-developed. He is not ill-appearing, toxic-appearing or diaphoretic.   HENT:      Head: Normocephalic and atraumatic.      Right Ear: A middle ear effusion is present. Tympanic membrane is injected and bulging.      Left Ear: A middle ear effusion is present. Tympanic membrane is bulging. Tympanic membrane is not injected.      Nose: Congestion present.      Mouth/Throat:      Lips: Pink.      Mouth: Mucous membranes are moist.   Cardiovascular:      Rate and Rhythm: Normal rate and regular rhythm.   Pulmonary:      Effort: Pulmonary effort is normal. No respiratory distress.      Breath sounds: Normal breath sounds. No stridor. No wheezing.   Lymphadenopathy:      Cervical: No cervical adenopathy.   Skin:     General: Skin is warm and dry.   Neurological:      General: " No focal deficit present.      Mental Status: He is alert and oriented to person, place, and time.   Psychiatric:         Mood and Affect: Mood normal.         Behavior: Behavior normal. Behavior is cooperative.         Thought Content: Thought content normal.         Judgment: Judgment normal.         Assessment / Plan      Assessment/Plan:   Diagnoses and all orders for this visit:    1. COVID-19 (Primary)  Assessment & Plan:  Discussed Paxlovid. Patient declined at this time.  Recommend symptomatic management, rest, maintain adequate hydration.  Quarantine at home per CDC guidelines.      2. Close exposure to COVID-19 virus  -     COVID-19 PCR, [facility], NP SWAB IN [employer] VIRAL TRANSPORT MEDIA/ORAL SWISH 24-30 HR TAT - Swab, Nasopharynx; Future  -     [facility] FLU + SARS PCR    3. Type 2 diabetes mellitus with hyperglycemia, without long-term current use of insulin ([facility])  Assessment & Plan:  Diabetes is improving with treatment, but remains above goal of less than 7.   Continue current treatment regimen.  Dietary recommendations for ADA diet.  Regular aerobic exercise.  Diabetes will be reassessed in 3 months.  I personally called [facility] and confirmed that they had Ozempic 0.25 mg pens in stock. Patient agreeable to transfer prescription to [facility].     Orders:  -     Semaglutide,0.25 or 0.5MG/DOS, (Ozempic, 0.25 or 0.5 MG/DOSE,) 2 MG/1.5ML solution pen-injector; Inject 0.25 mg under the skin into the appropriate area as directed 1 (One) Time Per Week.  Dispense: 3 mL; Refill: 5         Follow Up:   PRN and at next scheduled appointment(s) with PCP.    Discussed the nature of the medical condition(s) risks, complications, implications, management, safe and proper use of medications. Encouraged medication compliance, and keeping scheduled follow up appointments with me and any other providers.      RTC if symptoms fail to improve, to ER if symptoms worsen.        *Dictated Utilizing Dragon  Dictation   Please note that portions of this note were completed with a voice recognition program.   Part of this note may be an electronic transcription/translation of spoken language to printed text using the Dragon Dictation System. Spelling and/or grammatical errors may exist despite efforts at proofreading.        BRIDGET Carter  [facility] [place]

## 2023-02-09 NOTE — ASSESSMENT & PLAN NOTE
Discussed Paxlovid. Patient declined at this time.  Recommend symptomatic management, rest, maintain adequate hydration.  Quarantine at home per CDC guidelines.

## 2023-02-10 LAB — SARS-COV-2 RNA NOSE QL NAA+PROBE: DETECTED

## 2023-02-11 PROCEDURE — 93296 REM INTERROG EVL PM/IDS: CPT | Performed by: STUDENT IN AN ORGANIZED HEALTH CARE EDUCATION/TRAINING PROGRAM

## 2023-02-11 PROCEDURE — 93295 DEV INTERROG REMOTE 1/2/MLT: CPT | Performed by: STUDENT IN AN ORGANIZED HEALTH CARE EDUCATION/TRAINING PROGRAM

## 2023-02-20 DIAGNOSIS — E11.9 TYPE 2 DIABETES MELLITUS TREATED WITHOUT INSULIN: ICD-10-CM

## 2023-02-20 DIAGNOSIS — Z76.0 MEDICATION REFILL: ICD-10-CM

## 2023-02-20 RX ORDER — GLIMEPIRIDE 4 MG/1
4 TABLET ORAL 2 TIMES DAILY
Qty: 180 TABLET | Refills: 0 | Status: SHIPPED | OUTPATIENT
Start: 2023-02-20

## 2023-02-27 NOTE — PROGRESS NOTES
Sleep Clinic Video Visit Follow Up Note    You have chosen to receive care through a telehealth visit.  Do you consent to use a video connection for your medical care today? Yes       Chief Complaint  Follow-up compliance of PAP therapy    Subjective     History of Present Illness (from previous encounter on 12/15/2022):  Omar Can is a 62 y.o. male who presents for follow-up to discuss replacement of PAP therapy device. He reports that he spoke with [facility] and was told that he needed a new Rx. He is having difficulty with the device, specifically the filters. He is needing a new device. He is having difficulty with [employer].  (End copied text)    Interval History:  Omar Can is a 62 y.o. male returns for follow up and compliance of CPAP therapy. The patient was last seen on 12/15/2022. Overall the patient feels good with regard to therapy. He has 2 PAP devices now. The device appears to be working appropriately. On average the patient sleeps 8.5-9 hours per night. The patient wake 3-4 at times per night to use the restroom. He will usually wake once per night.     The patient reports the following changes to their medical and medication history since they were last seen:  Afib  He had a prostate procedure  Now on Apixaban      Further details are as follows:    Mil[place] Scale is: 7/24    Weight:  Current Weight: 260 lb      The patient's relevant past medical, surgical, family, and social history reviewed and updated in Epic as appropriate.    PMH:    Past Medical History:   Diagnosis Date   • Arthritis    • Benign prostatic hyperplasia    • CHF (congestive heart failure) (HCC)    • Colon polyps    • Coronary artery disease    • Coronary stent patent     x 5 stents    • Diabetes mellitus (HCC)    • Elevated cholesterol    • Hypertension    • Myocardial infarction (HCC) 2010    cardiac arrest   • Obesity    • Paroxysmal atrial fibrillation (HCC) 1/17/2023   • Prostate hypertrophy    • Sleep apnea      cpap nightly     Past Surgical History:   Procedure Laterality Date   • CARDIAC CATHETERIZATION     • CARDIAC DEFIBRILLATOR PLACEMENT  03/2010    Implanted after cardiac arrest per patient   • COLONOSCOPY     • CORONARY STENT PLACEMENT      5 stents placed at one time   • CYSTOSCOPY     • CYSTOSCOPY TRANSURETHRAL RESECTION OF PROSTATE N/A 03/02/2022    Procedure: GREENLIGHT LASER VAPORIZATION OF PROSTATE;  Surgeon: Todd Ponce MD;  Location:  [facility];  Service: Urology;  Laterality: N/A;   • CYSTOSCOPY TRANSURETHRAL RESECTION OF PROSTATE N/A 12/07/2022    Procedure: TRANSURETHRAL RESECTION OF PROSTATE WITH GREENLIGHT LASER;  Surgeon: Todd Ponce MD;  Location:  [facility];  Service: Urology;  Laterality: N/A;   • INSERT / REPLACE / REMOVE PACEMAKER  03/2010   • JOINT REPLACEMENT  2018   • KNEE ARTHROSCOPY Right    • OTHER SURGICAL HISTORY      defibulator with pacemaker --Medtronic, Cardiologist Dr. Сергей [facility]   • PACEMAKER IMPLANTATION  05/29/2019    battery change per patient   • PROSTATE SURGERY  2022    in march and dec   • REPLACEMENT TOTAL KNEE Left 11/17/2018   • VASECTOMY  1985   • VESICOSTOMY     • WISDOM TOOTH EXTRACTION      3 out 4 removed.        Allergies   Allergen Reactions   • Cyclobenzaprine Hives   • Floxacillin (Flucloxacillin) Hives       MEDS:  Prior to Admission medications    Medication Sig Start Date End Date Taking? Authorizing Provider   amLODIPine (NORVASC) 10 MG tablet Take 10 mg by mouth Daily. 2/1/22   Talat Solis MD   apixaban (ELIQUIS) 5 MG tablet tablet Take 1 tablet by mouth Every 12 (Twelve) Hours. 1/17/23   Eder Magana PA   aspirin 81 MG tablet Take 81 mg by mouth Daily.    Talat Solis MD   atorvastatin (LIPITOR) 40 MG tablet TAKE 1 TABLET BY MOUTH EVERY DAY AT NIGHT 2/1/23   Diana Pardo APRN   carvedilol (COREG) 25 MG tablet Take 25 mg by mouth 2 (Two) Times a Day With Meals.    Talat Solis MD   coenzyme Q10  100 MG capsule Take 100 mg by mouth Daily.    Talat Solis MD   finasteride (PROSCAR) 5 MG tablet Take 5 mg by mouth Daily. 7/7/21   Talat Solis MD   furosemide (LASIX) 20 MG tablet Take 20 mg by mouth As Needed. 12/16/20   Talat Solis MD   glimepiride (AMARYL) 4 MG tablet Take 1 tablet by mouth 2 (Two) Times a Day. 2/20/23   Diana Pardo APRN   glucose blood (OneTouch Verio) test strip USE TO CHECK BLOOD GLUCOSE ONCE DAILY OR AS DIRECTED 2/6/23   Diana Pardo APRN   glucose monitor monitoring kit 1 each As Needed (for diabetes). 1/3/20   Cristina Davis APRN   hydrALAZINE (APRESOLINE) 50 MG tablet Take 50 mg by mouth Every 8 (Eight) Hours.    Talat Solis MD   HYDROcodone-acetaminophen (NORCO)  MG per tablet Take 1 tablet by mouth Every 6 (Six) Hours As Needed for Moderate Pain. 12/7/22   Todd Ponce MD   hydrOXYzine (ATARAX) 25 MG tablet Take 1 tablet by mouth At Night As Needed (insomnia). 12/14/22   Diana Pardo APRN   Lancets (ONETOUCH ULTRASOFT) lancets Check daily and prn 1/3/20   Cristina Davis APRN   metFORMIN (GLUCOPHAGE) 850 MG tablet TAKE 1/2 TABLET BY MOUTH TWO TIMES A DAY 1/28/23   Diana Pardo APRN   Multiple Vitamin (MULTI-VITAMIN DAILY PO) Take 1 tablet by mouth Daily.    Talat Solis MD   nitroglycerin (NITROSTAT) 0.3 MG SL tablet Place 1 tablet under the tongue Every 5 (Five) Minutes As Needed for Chest Pain. Take no more than 3 doses in 15 minutes. 1/17/23   Eder Magana PA   oxybutynin XL (DITROPAN-XL) 10 MG 24 hr tablet Take 10 mg by mouth Every Night.    Talat Solis MD   Potassium Gluconate 2 MEQ tablet Take 2 mEq by mouth Daily.    Talat Solis MD   Semaglutide,0.25 or 0.5MG/DOS, (Ozempic, 0.25 or 0.5 MG/DOSE,) 2 MG/1.5ML solution pen-injector Inject 0.25 mg under the skin into the appropriate area as directed 1 (One) Time Per Week. 2/9/23   Diana Pardo APRN   tamsulosin (FLOMAX)  "0.4 MG capsule 24 hr capsule Take 1 capsule by mouth Daily. 9/14/22   Diana Pardo APRN   valsartan (DIOVAN) 320 MG tablet Take 320 mg by mouth Daily. 2/1/22   Provider, MD Talat         FH:  Family History   Problem Relation Age of Onset   • Hypertension Mother    • Heart failure Mother    • Stroke Mother    • Diabetes Mother    • Heart disease Mother    • Heart attack Father    • Diabetes Father    • Heart disease Father    • Vision loss Father    • No Known Problems Sister    • No Known Problems Sister    • Leukemia Sister    • Cerebral palsy Brother    • No Known Problems Brother    • No Known Problems Brother    • No Known Problems Daughter    • Diabetes Son        Objective   Vital Signs:  Ht 185.4 cm (72.99\")   Wt 118 kg (260 lb)   BMI 34.31 kg/m²           Physical Exam  Constitutional:       Appearance: Normal appearance.   Neurological:      Mental Status: He is alert and oriented to person, place, and time.   Psychiatric:         Mood and Affect: Mood normal.         Behavior: Behavior normal.               Result Review :           CPAP Report:  AHI: 3.1/h  Days of Usage: 63/67 (94%)  95th Percentile leaks: 28.3 L/min  Number of Days Greater than 4 hours: 63/67 (94%)  Average usage (days used): 9 hours  Settings: CPAP-set pressure 16 cm H2O, EPR full-time, EPR level 2.       Assessment and Plan  Omar Can is a 62 y.o. male who returns for follow-up compliance of PAP therapy.  Pap report has been reviewed.  Patient is fully compliant with greater than 4-hour usage at 94%.  He averages 9 hours on nights used.  AHI is well-controlled at 3.1/H. I will refill the patient's supplies, and they will return for follow-up and compliance in 1 year or sooner should they have further questions or concerns.      Diagnoses and all orders for this visit:    1. Obstructive sleep apnea, adult (Primary)  -     PAP Therapy         The patient continues to use and benefit from CPAP therapy.    1. The " patient was counseled regarding multimodal approach with healthy nutrition, healthy sleep, regular physical activity, social activities, counseling, and medications. Encouraged to practice lateral sleep position. Avoid alcohol and sedatives close to bedtime.     2.  We will refill supplies x1 year.  Return to clinic 1 year or sooner if symptoms warrant.  Patient gave verbal consent today for video visit. I have reviewed the results of my evaluation and impression and discussed my recommendations in detail with the patient.         Follow Up  Return in about 1 year (around 2/28/2024) for Annual visit.  Patient was given instructions and counseling regarding his condition or for health maintenance advice. Please see specific information pulled into the AVS if appropriate.       BRIDGET Wells, ACNP-BC  Pulmonology, Critical Care, and Sleep Medicine

## 2023-02-28 ENCOUNTER — TELEMEDICINE (OUTPATIENT)
Dept: SLEEP MEDICINE | Facility: HOSPITAL | Age: 63
End: 2023-02-28
Payer: MEDICARE

## 2023-02-28 VITALS — BODY MASS INDEX: 34.46 KG/M2 | WEIGHT: 260 LBS | HEIGHT: 73 IN

## 2023-02-28 DIAGNOSIS — G47.33 OBSTRUCTIVE SLEEP APNEA, ADULT: Primary | ICD-10-CM

## 2023-02-28 PROCEDURE — 99213 OFFICE O/P EST LOW 20 MIN: CPT | Performed by: NURSE PRACTITIONER

## 2023-03-14 DIAGNOSIS — N40.0 PROSTATE HYPERTROPHY: ICD-10-CM

## 2023-03-14 DIAGNOSIS — Z76.0 MEDICATION REFILL: ICD-10-CM

## 2023-03-14 RX ORDER — TAMSULOSIN HYDROCHLORIDE 0.4 MG/1
1 CAPSULE ORAL DAILY
Qty: 90 CAPSULE | Refills: 2 | Status: SHIPPED | OUTPATIENT
Start: 2023-03-14

## 2023-04-06 DIAGNOSIS — E11.9 TYPE 2 DIABETES MELLITUS TREATED WITHOUT INSULIN: ICD-10-CM

## 2023-04-06 DIAGNOSIS — Z76.0 MEDICATION REFILL: ICD-10-CM

## 2023-04-24 ENCOUNTER — CLINICAL SUPPORT NO REQUIREMENTS (OUTPATIENT)
Dept: CARDIOLOGY | Facility: CLINIC | Age: 63
End: 2023-04-24
Payer: MEDICARE

## 2023-04-24 DIAGNOSIS — T82.110A FAILURE OF IMPLANTABLE CARDIOVERTER-DEFIBRILLATOR (ICD) LEAD, INITIAL ENCOUNTER: ICD-10-CM

## 2023-04-24 DIAGNOSIS — Z95.810 BIVENTRICULAR IMPLANTABLE CARDIOVERTER-DEFIBRILLATOR (ICD) IN SITU: Primary | ICD-10-CM

## 2023-04-24 NOTE — PROGRESS NOTES
Device checked- RV lead noise.  Spoke to Dr Epps and is agreeable to new RV lead on Wednesday.  Dr Epps explained risk of procedure.  Also defib portion is turned off and discussed risk of defib being off.  See scanned interrogation.

## 2023-04-26 ENCOUNTER — HOSPITAL ENCOUNTER (OUTPATIENT)
Facility: HOSPITAL | Age: 63
Discharge: HOME | End: 2023-04-27
Attending: STUDENT IN AN ORGANIZED HEALTH CARE EDUCATION/TRAINING PROGRAM | Admitting: STUDENT IN AN ORGANIZED HEALTH CARE EDUCATION/TRAINING PROGRAM
Payer: MEDICARE

## 2023-04-26 DIAGNOSIS — I51.9 HEART DISEASE: ICD-10-CM

## 2023-04-26 DIAGNOSIS — Z95.810 BIVENTRICULAR IMPLANTABLE CARDIOVERTER-DEFIBRILLATOR (ICD) IN SITU: ICD-10-CM

## 2023-04-26 DIAGNOSIS — T82.110A FAILURE OF IMPLANTABLE CARDIOVERTER-DEFIBRILLATOR (ICD) LEAD, INITIAL ENCOUNTER: ICD-10-CM

## 2023-04-26 LAB
ALBUMIN SERPL-MCNC: 4.7 G/DL (ref 3.5–5.2)
ALBUMIN/GLOB SERPL: 2 G/DL
ALP SERPL-CCNC: 86 U/L (ref 39–117)
ALT SERPL W P-5'-P-CCNC: 36 U/L (ref 1–41)
ANION GAP SERPL CALCULATED.3IONS-SCNC: 12 MMOL/L (ref 5–15)
AST SERPL-CCNC: 31 U/L (ref 1–40)
BILIRUB SERPL-MCNC: 0.5 MG/DL (ref 0–1.2)
BUN SERPL-MCNC: 15 MG/DL (ref 8–23)
BUN/CREAT SERPL: 14.4 (ref 7–25)
CALCIUM SPEC-SCNC: 9.9 MG/DL (ref 8.6–10.5)
CHLORIDE SERPL-SCNC: 102 MMOL/L (ref 98–107)
CO2 SERPL-SCNC: 26 MMOL/L (ref 22–29)
CREAT SERPL-MCNC: 1.04 MG/DL (ref 0.76–1.27)
DEPRECATED RDW RBC AUTO: 39.8 FL (ref 37–54)
EGFRCR SERPLBLD CKD-EPI 2021: 80.2 ML/MIN/1.73
ERYTHROCYTE [DISTWIDTH] IN BLOOD BY AUTOMATED COUNT: 12.4 % (ref 12.3–15.4)
GLOBULIN UR ELPH-MCNC: 2.3 GM/DL
GLUCOSE BLDC GLUCOMTR-MCNC: 143 MG/DL (ref 70–130)
GLUCOSE SERPL-MCNC: 147 MG/DL (ref 65–99)
HCT VFR BLD AUTO: 44.2 % (ref 37.5–51)
HGB BLD-MCNC: 14.9 G/DL (ref 13–17.7)
MCH RBC QN AUTO: 29.7 PG (ref 26.6–33)
MCHC RBC AUTO-ENTMCNC: 33.7 G/DL (ref 31.5–35.7)
MCV RBC AUTO: 88 FL (ref 79–97)
PLATELET # BLD AUTO: 223 10*3/MM3 (ref 140–450)
PMV BLD AUTO: 11 FL (ref 6–12)
POTASSIUM SERPL-SCNC: 4 MMOL/L (ref 3.5–5.2)
PROT SERPL-MCNC: 7 G/DL (ref 6–8.5)
RBC # BLD AUTO: 5.02 10*6/MM3 (ref 4.14–5.8)
SODIUM SERPL-SCNC: 140 MMOL/L (ref 136–145)
WBC NRBC COR # BLD: 5.34 10*3/MM3 (ref 3.4–10.8)

## 2023-04-26 PROCEDURE — 33241 REMOVE PULSE GENERATOR: CPT | Performed by: STUDENT IN AN ORGANIZED HEALTH CARE EDUCATION/TRAINING PROGRAM

## 2023-04-26 PROCEDURE — 99153 MOD SED SAME PHYS/QHP EA: CPT | Performed by: STUDENT IN AN ORGANIZED HEALTH CARE EDUCATION/TRAINING PROGRAM

## 2023-04-26 PROCEDURE — 25010000002 VANCOMYCIN 10 G RECONSTITUTED SOLUTION: Performed by: PHYSICIAN ASSISTANT

## 2023-04-26 PROCEDURE — 25010000002 MIDAZOLAM PER 1 MG: Performed by: STUDENT IN AN ORGANIZED HEALTH CARE EDUCATION/TRAINING PROGRAM

## 2023-04-26 PROCEDURE — 94799 UNLISTED PULMONARY SVC/PX: CPT

## 2023-04-26 PROCEDURE — 33249 INSJ/RPLCMT DEFIB W/LEAD(S): CPT | Performed by: STUDENT IN AN ORGANIZED HEALTH CARE EDUCATION/TRAINING PROGRAM

## 2023-04-26 PROCEDURE — 33244 REMOVE ELCTRD TRANSVENOUSLY: CPT | Performed by: STUDENT IN AN ORGANIZED HEALTH CARE EDUCATION/TRAINING PROGRAM

## 2023-04-26 PROCEDURE — 25510000001 IOPAMIDOL PER 1 ML: Performed by: STUDENT IN AN ORGANIZED HEALTH CARE EDUCATION/TRAINING PROGRAM

## 2023-04-26 PROCEDURE — 25010000002 FENTANYL CITRATE (PF) 50 MCG/ML SOLUTION: Performed by: STUDENT IN AN ORGANIZED HEALTH CARE EDUCATION/TRAINING PROGRAM

## 2023-04-26 PROCEDURE — 25010000002 MORPHINE PER 10 MG: Performed by: PHYSICIAN ASSISTANT

## 2023-04-26 PROCEDURE — 94660 CPAP INITIATION&MGMT: CPT

## 2023-04-26 PROCEDURE — C1777 LEAD, AICD, ENDO SINGLE COIL: HCPCS | Performed by: STUDENT IN AN ORGANIZED HEALTH CARE EDUCATION/TRAINING PROGRAM

## 2023-04-26 PROCEDURE — C1892 INTRO/SHEATH,FIXED,PEEL-AWAY: HCPCS | Performed by: STUDENT IN AN ORGANIZED HEALTH CARE EDUCATION/TRAINING PROGRAM

## 2023-04-26 PROCEDURE — C1882 AICD, OTHER THAN SING/DUAL: HCPCS | Performed by: STUDENT IN AN ORGANIZED HEALTH CARE EDUCATION/TRAINING PROGRAM

## 2023-04-26 PROCEDURE — 85027 COMPLETE CBC AUTOMATED: CPT | Performed by: STUDENT IN AN ORGANIZED HEALTH CARE EDUCATION/TRAINING PROGRAM

## 2023-04-26 PROCEDURE — 25010000002 ONDANSETRON PER 1 MG: Performed by: STUDENT IN AN ORGANIZED HEALTH CARE EDUCATION/TRAINING PROGRAM

## 2023-04-26 PROCEDURE — 82962 GLUCOSE BLOOD TEST: CPT

## 2023-04-26 PROCEDURE — 99152 MOD SED SAME PHYS/QHP 5/>YRS: CPT | Performed by: STUDENT IN AN ORGANIZED HEALTH CARE EDUCATION/TRAINING PROGRAM

## 2023-04-26 PROCEDURE — 80053 COMPREHEN METABOLIC PANEL: CPT | Performed by: PHYSICIAN ASSISTANT

## 2023-04-26 DEVICE — GEN DEFIB CLARIA MRI QUAD IS1/DF4 CRTD: Type: IMPLANTABLE DEVICE | Site: HEART | Status: FUNCTIONAL

## 2023-04-26 DEVICE — LD DEFIB SPRINT QUATTRO SECUR S DF4 62: Type: IMPLANTABLE DEVICE | Status: FUNCTIONAL

## 2023-04-26 RX ORDER — SODIUM CHLORIDE 0.9 % (FLUSH) 0.9 %
10 SYRINGE (ML) INJECTION AS NEEDED
Status: DISCONTINUED | OUTPATIENT
Start: 2023-04-26 | End: 2023-04-27 | Stop reason: HOSPADM

## 2023-04-26 RX ORDER — AMLODIPINE BESYLATE 10 MG/1
10 TABLET ORAL DAILY
Status: DISCONTINUED | OUTPATIENT
Start: 2023-04-27 | End: 2023-04-27 | Stop reason: HOSPADM

## 2023-04-26 RX ORDER — LIDOCAINE HYDROCHLORIDE 10 MG/ML
INJECTION, SOLUTION EPIDURAL; INFILTRATION; INTRACAUDAL; PERINEURAL
Status: DISCONTINUED | OUTPATIENT
Start: 2023-04-26 | End: 2023-04-26 | Stop reason: HOSPADM

## 2023-04-26 RX ORDER — HYDROXYZINE HYDROCHLORIDE 25 MG/1
25 TABLET, FILM COATED ORAL NIGHTLY PRN
Status: DISCONTINUED | OUTPATIENT
Start: 2023-04-26 | End: 2023-04-27 | Stop reason: HOSPADM

## 2023-04-26 RX ORDER — IBUPROFEN 600 MG/1
1 TABLET ORAL
Status: DISCONTINUED | OUTPATIENT
Start: 2023-04-26 | End: 2023-04-27 | Stop reason: HOSPADM

## 2023-04-26 RX ORDER — ACETAMINOPHEN 325 MG/1
650 TABLET ORAL EVERY 4 HOURS PRN
Status: DISCONTINUED | OUTPATIENT
Start: 2023-04-26 | End: 2023-04-27 | Stop reason: HOSPADM

## 2023-04-26 RX ORDER — VALSARTAN 160 MG/1
320 TABLET ORAL DAILY
Status: DISCONTINUED | OUTPATIENT
Start: 2023-04-27 | End: 2023-04-27 | Stop reason: HOSPADM

## 2023-04-26 RX ORDER — BUPIVACAINE HYDROCHLORIDE 5 MG/ML
INJECTION, SOLUTION PERINEURAL
Status: DISCONTINUED | OUTPATIENT
Start: 2023-04-26 | End: 2023-04-26 | Stop reason: HOSPADM

## 2023-04-26 RX ORDER — HYDRALAZINE HYDROCHLORIDE 50 MG/1
50 TABLET, FILM COATED ORAL EVERY 8 HOURS SCHEDULED
Status: DISCONTINUED | OUTPATIENT
Start: 2023-04-26 | End: 2023-04-27 | Stop reason: HOSPADM

## 2023-04-26 RX ORDER — ATORVASTATIN CALCIUM 40 MG/1
40 TABLET, FILM COATED ORAL DAILY
Status: DISCONTINUED | OUTPATIENT
Start: 2023-04-27 | End: 2023-04-27 | Stop reason: HOSPADM

## 2023-04-26 RX ORDER — SODIUM CHLORIDE 9 MG/ML
40 INJECTION, SOLUTION INTRAVENOUS AS NEEDED
Status: DISCONTINUED | OUTPATIENT
Start: 2023-04-26 | End: 2023-04-27 | Stop reason: HOSPADM

## 2023-04-26 RX ORDER — NICOTINE POLACRILEX 4 MG
15 LOZENGE BUCCAL
Status: DISCONTINUED | OUTPATIENT
Start: 2023-04-26 | End: 2023-04-27 | Stop reason: HOSPADM

## 2023-04-26 RX ORDER — FENTANYL CITRATE 50 UG/ML
INJECTION, SOLUTION INTRAMUSCULAR; INTRAVENOUS
Status: DISCONTINUED | OUTPATIENT
Start: 2023-04-26 | End: 2023-04-26 | Stop reason: HOSPADM

## 2023-04-26 RX ORDER — SODIUM CHLORIDE 0.9 % (FLUSH) 0.9 %
10 SYRINGE (ML) INJECTION EVERY 12 HOURS SCHEDULED
Status: DISCONTINUED | OUTPATIENT
Start: 2023-04-26 | End: 2023-04-26 | Stop reason: HOSPADM

## 2023-04-26 RX ORDER — MORPHINE SULFATE 2 MG/ML
2 INJECTION, SOLUTION INTRAMUSCULAR; INTRAVENOUS EVERY 4 HOURS PRN
Status: DISCONTINUED | OUTPATIENT
Start: 2023-04-26 | End: 2023-04-27 | Stop reason: HOSPADM

## 2023-04-26 RX ORDER — DEXTROSE MONOHYDRATE 25 G/50ML
25 INJECTION, SOLUTION INTRAVENOUS
Status: DISCONTINUED | OUTPATIENT
Start: 2023-04-26 | End: 2023-04-27 | Stop reason: HOSPADM

## 2023-04-26 RX ORDER — SODIUM CHLORIDE 0.9 % (FLUSH) 0.9 %
10 SYRINGE (ML) INJECTION AS NEEDED
Status: DISCONTINUED | OUTPATIENT
Start: 2023-04-26 | End: 2023-04-26 | Stop reason: HOSPADM

## 2023-04-26 RX ORDER — INSULIN LISPRO 100 [IU]/ML
0-7 INJECTION, SOLUTION INTRAVENOUS; SUBCUTANEOUS
Status: DISCONTINUED | OUTPATIENT
Start: 2023-04-26 | End: 2023-04-27 | Stop reason: HOSPADM

## 2023-04-26 RX ORDER — SODIUM CHLORIDE 9 MG/ML
40 INJECTION, SOLUTION INTRAVENOUS AS NEEDED
Status: DISCONTINUED | OUTPATIENT
Start: 2023-04-26 | End: 2023-04-26 | Stop reason: HOSPADM

## 2023-04-26 RX ORDER — TAMSULOSIN HYDROCHLORIDE 0.4 MG/1
0.4 CAPSULE ORAL NIGHTLY
Status: DISCONTINUED | OUTPATIENT
Start: 2023-04-26 | End: 2023-04-27 | Stop reason: HOSPADM

## 2023-04-26 RX ORDER — SODIUM CHLORIDE 0.9 % (FLUSH) 0.9 %
3 SYRINGE (ML) INJECTION EVERY 12 HOURS SCHEDULED
Status: DISCONTINUED | OUTPATIENT
Start: 2023-04-26 | End: 2023-04-27 | Stop reason: HOSPADM

## 2023-04-26 RX ORDER — ONDANSETRON 2 MG/ML
INJECTION INTRAMUSCULAR; INTRAVENOUS
Status: DISCONTINUED | OUTPATIENT
Start: 2023-04-26 | End: 2023-04-26 | Stop reason: HOSPADM

## 2023-04-26 RX ORDER — MIDAZOLAM HYDROCHLORIDE 1 MG/ML
INJECTION INTRAMUSCULAR; INTRAVENOUS
Status: DISCONTINUED | OUTPATIENT
Start: 2023-04-26 | End: 2023-04-26 | Stop reason: HOSPADM

## 2023-04-26 RX ORDER — HYDROCODONE BITARTRATE AND ACETAMINOPHEN 5; 325 MG/1; MG/1
1 TABLET ORAL EVERY 6 HOURS PRN
Status: DISCONTINUED | OUTPATIENT
Start: 2023-04-26 | End: 2023-04-27 | Stop reason: HOSPADM

## 2023-04-26 RX ORDER — CARVEDILOL 12.5 MG/1
25 TABLET ORAL 2 TIMES DAILY
Status: DISCONTINUED | OUTPATIENT
Start: 2023-04-26 | End: 2023-04-27 | Stop reason: HOSPADM

## 2023-04-26 RX ORDER — ACETAMINOPHEN 650 MG/1
650 SUPPOSITORY RECTAL EVERY 4 HOURS PRN
Status: DISCONTINUED | OUTPATIENT
Start: 2023-04-26 | End: 2023-04-27 | Stop reason: HOSPADM

## 2023-04-26 RX ADMIN — HYDROCODONE BITARTRATE AND ACETAMINOPHEN 1 TABLET: 5; 325 TABLET ORAL at 19:59

## 2023-04-26 RX ADMIN — HYDRALAZINE HYDROCHLORIDE 50 MG: 50 TABLET, FILM COATED ORAL at 21:25

## 2023-04-26 RX ADMIN — MORPHINE SULFATE 2 MG: 2 INJECTION, SOLUTION INTRAMUSCULAR; INTRAVENOUS at 23:10

## 2023-04-26 RX ADMIN — TAMSULOSIN HYDROCHLORIDE 0.4 MG: 0.4 CAPSULE ORAL at 21:25

## 2023-04-26 RX ADMIN — VANCOMYCIN HYDROCHLORIDE 1750 MG: 10 INJECTION, POWDER, LYOPHILIZED, FOR SOLUTION INTRAVENOUS at 16:49

## 2023-04-26 NOTE — H&P
Pre-procedure History and Physical  [facility] at [facility]      Patient:  Omar Can  :  1960  MRN: [id number]    PCP:  Diana Pardo APRN  PHONE:  [phone]    DATE: 2023  ID: Omar Can is a 62 y.o. male resident of [place], [place]     CC: RV lead noise     PROBLEM LIST:   1. Ischemic heart disease  a. Cardiac arrest admission to [facility] 2010 with left heart catheterization by Dr. MIKE with emergent catheter-based intervention.  (Database insufficiency)  b. Repeat left heart catheterization 48 hours with catheter-based invention 2 stents other vessels (database insufficiency)  c. MDT BIVICD placed  [facility] (database insufficiency ) Dr. Quiroga  d. Medtronic BIVIICD generator change 2019  e. Echocardiogram May 20, 2021 LVH EF 60% mild AI  f. Normal myocardial perfusion study May 20, 2021 EF calculated 42%.  2. New onset A. fib: Documented by interrogation today 2021 episode lasted 13 hours.  3. Hypertension  4. Hyperlipidemia  5. BPH, recent cystoscope and recent scope with Laser for prostate. Dr. Ponce  6. Diabetes mellitus type 2  7. Obstructive sleep apnea, Compliance with CPAP    BRIEF HPI: Mr. Can is a 61 y/o male with above noted history who presents for RV lead revision. Recent remote interrogation showed RV impedance up to 1007 ohms from 342 on prior interrogation in January. He has been holding his Eliquis since Monday morning. He presents for RV lead revision today. He is allergic to PCN with hives.       Allergies:      Allergies   Allergen Reactions   • Cyclobenzaprine Hives   • Floxacillin (Flucloxacillin) Hives       MEDICATIONS:  Current Outpatient Medications   Medication Instructions   • amLODIPine (NORVASC) 10 mg, Oral, Daily   • apixaban (ELIQUIS) 5 mg, Oral, Every 12 Hours Scheduled   • aspirin 81 mg, Oral, Daily   • atorvastatin (LIPITOR) 40 MG tablet TAKE 1 TABLET  BY MOUTH EVERY DAY AT NIGHT   • carvedilol (COREG) 25 mg, Oral, 2 Times Daily With Meals   • coenzyme Q10 100 mg, Oral, Daily   • finasteride (PROSCAR) 5 mg, Oral, Daily   • furosemide (LASIX) 20 mg, Oral, As Needed   • glimepiride (AMARYL) 4 mg, Oral, 2 Times Daily   • glucose blood (OneTouch Verio) test strip USE TO CHECK BLOOD GLUCOSE ONCE DAILY OR AS DIRECTED   • glucose monitor monitoring kit 1 each, Does not apply, As Needed   • hydrALAZINE (APRESOLINE) 50 mg, Oral, Every 8 Hours Scheduled   • HYDROcodone-acetaminophen (NORCO)  MG per tablet 1 tablet, Oral, Every 6 Hours PRN   • hydrOXYzine (ATARAX) 25 mg, Oral, Nightly PRN   • Lancets (ONETOUCH ULTRASOFT) lancets Check daily and prn   • metFORMIN (GLUCOPHAGE) 425 mg, Oral, 2 Times Daily   • Multiple Vitamin (MULTI-VITAMIN DAILY PO) 1 tablet, Oral, Daily   • nitroglycerin (NITROSTAT) 0.3 mg, Sublingual, Every 5 Minutes PRN, Take no more than 3 doses in 15 minutes.    • oxybutynin XL (DITROPAN-XL) 10 mg, Oral, Nightly   • Ozempic (0.25 or 0.5 MG/DOSE) 0.25 mg, Subcutaneous, Weekly   • Potassium Gluconate 2 MEQ tablet 2 mEq, Oral, Daily   • tamsulosin (FLOMAX) 0.4 mg, Oral, Daily   • valsartan (DIOVAN) 320 mg, Oral, Daily       Past medical & surgical history, social and family history reviewed in the electronic medical record.    ROS: Pertinent positives listed in the HPI and problem list above. All others reviewed and negative.     Physical Exam:   There were no vitals taken for this visit.    Constitutional:    Alert, cooperative, in no acute distress   Neck:     No Jugular venous distention, adenopathy, or thyromegaly noted.    Heart:    Regular rhythm and normal rate, normal S1 and S2, no murmurs,gallops, rubs, or clicks. No distinct PMI noted.    Lungs:     Clear to auscultation bilaterally, respirations regular, even and unlabored    Abdomen:     Soft nontender, nondistended, normal bowel sounds   Extremities:   No gross deformities, no edema,  clubbing, or cyanosis.        Labs and Diagnostic Data:          Lab Results   Component Value Date    CHOL 111 09/15/2022    TRIG 113 09/15/2022    HDL 32 (L) 09/15/2022    LDL 58 09/15/2022    AST 27 09/15/2022    ALT 28 09/15/2022                     IMPRESSION:  · 61 y/o male with ICM, s/p BiV ICD with recent interrogation showing RV impedance up to 1007ohms from 342 prior.   · Last dose of Eliquis was Monday morning 4/24    PLAN:  · Procedure to perform: RV lead revision. Risks, benefits and alternatives to the procedure explained to the patient and he understands and wishes to proceed.       Electronically signed by Hayde Luna PA-C, 04/26/23, 1:36 PM EDT.

## 2023-04-27 ENCOUNTER — APPOINTMENT (OUTPATIENT)
Dept: GENERAL RADIOLOGY | Facility: HOSPITAL | Age: 63
End: 2023-04-27
Payer: MEDICARE

## 2023-04-27 VITALS
RESPIRATION RATE: 16 BRPM | SYSTOLIC BLOOD PRESSURE: 140 MMHG | WEIGHT: 252.6 LBS | HEIGHT: 73 IN | BODY MASS INDEX: 33.48 KG/M2 | TEMPERATURE: 96.7 F | DIASTOLIC BLOOD PRESSURE: 70 MMHG | OXYGEN SATURATION: 91 % | HEART RATE: 59 BPM

## 2023-04-27 LAB — GLUCOSE BLDC GLUCOMTR-MCNC: 186 MG/DL (ref 70–130)

## 2023-04-27 PROCEDURE — 82948 REAGENT STRIP/BLOOD GLUCOSE: CPT

## 2023-04-27 PROCEDURE — 71046 X-RAY EXAM CHEST 2 VIEWS: CPT

## 2023-04-27 PROCEDURE — 63710000001 INSULIN LISPRO (HUMAN) PER 5 UNITS: Performed by: STUDENT IN AN ORGANIZED HEALTH CARE EDUCATION/TRAINING PROGRAM

## 2023-04-27 RX ADMIN — INSULIN LISPRO 2 UNITS: 100 INJECTION, SOLUTION INTRAVENOUS; SUBCUTANEOUS at 12:04

## 2023-04-27 RX ADMIN — HYDROCODONE BITARTRATE AND ACETAMINOPHEN 1 TABLET: 5; 325 TABLET ORAL at 08:49

## 2023-04-27 RX ADMIN — CARVEDILOL 25 MG: 12.5 TABLET, FILM COATED ORAL at 08:46

## 2023-04-27 RX ADMIN — HYDRALAZINE HYDROCHLORIDE 50 MG: 50 TABLET, FILM COATED ORAL at 06:03

## 2023-04-27 RX ADMIN — Medication 3 ML: at 08:49

## 2023-04-27 RX ADMIN — AMLODIPINE BESYLATE 10 MG: 10 TABLET ORAL at 08:46

## 2023-04-27 RX ADMIN — VALSARTAN 320 MG: 160 TABLET, FILM COATED ORAL at 08:46

## 2023-04-27 RX ADMIN — ATORVASTATIN CALCIUM 40 MG: 40 TABLET, FILM COATED ORAL at 08:46

## 2023-04-27 NOTE — PROGRESS NOTES
[facility] at [facility]  Progress Note     LOS: 0 days   Patient Care Team:  Diana Pardo APRN as PCP - General (Family Medicine)  Todd Ponec MD as Consulting Physician (Urology)  Margarito Damon MD as Consulting Physician (Cardiology)  Brayan Epps MD as Consulting Physician (Cardiology)  Charan Durham APRN as Nurse Practitioner (Pulmonary Disease)    Chief Complaint:  Follow up ICD revision     Subjective     Patient is feeling well. Wants to go home. Device site is swollen, mildy tender.   No CP or SOB        Review of Systems:   Pertinent positives in HPI, all others reviewed and negative.      Current Facility-Administered Medications:   •  acetaminophen (TYLENOL) tablet 650 mg, 650 mg, Oral, Q4H PRN **OR** acetaminophen (TYLENOL) suppository 650 mg, 650 mg, Rectal, Q4H PRN, Brayan Epps MD  •  amLODIPine (NORVASC) tablet 10 mg, 10 mg, Oral, Daily, Brayan Epps MD, 10 mg at 04/27/23 0846  •  atorvastatin (LIPITOR) tablet 40 mg, 40 mg, Oral, Daily, Brayan Epps MD, 40 mg at 04/27/23 0846  •  carvedilol (COREG) tablet 25 mg, 25 mg, Oral, BID, Brayan Epps MD, 25 mg at 04/27/23 0846  •  dextrose (D50W) (25 g/50 mL) IV injection 25 g, 25 g, Intravenous, Q15 Min PRN, Brayan Epps MD  •  dextrose (GLUTOSE) oral gel 15 g, 15 g, Oral, Q15 Min PRN, Brayan Epps MD  •  glucagon (GLUCAGEN) injection 1 mg, 1 mg, Intramuscular, Q15 Min PRN, Brayan Epps MD  •  hydrALAZINE (APRESOLINE) tablet 50 mg, 50 mg, Oral, Q8H, Brayan Epps MD, 50 mg at 04/27/23 0603  •  HYDROcodone-acetaminophen (NORCO) 5-325 MG per tablet 1 tablet, 1 tablet, Oral, Q6H PRN, Brayan Epps MD, 1 tablet at 04/27/23 0849  •  hydrOXYzine (ATARAX) tablet 25 mg, 25 mg, Oral, Nightly PRN, Brayan Epps MD  •  Insulin Lispro (humaLOG) injection 0-7 Units, 0-7 Units, Subcutaneous, TID With Meals, Brayan Epps MD  •  morphine injection 2 mg, 2 mg, Intravenous, Q4H PRN, Liana Miller PA-C, 2  "mg at 04/26/23 2310  •  sodium chloride 0.9 % flush 10 mL, 10 mL, Intravenous, PRN, Brayan Epps MD  •  sodium chloride 0.9 % flush 3 mL, 3 mL, Intravenous, Q12H, Brayan Epps MD, 3 mL at 04/27/23 0849  •  sodium chloride 0.9 % infusion 40 mL, 40 mL, Intravenous, PRN, Brayan Epps MD  •  tamsulosin (FLOMAX) 24 hr capsule 0.4 mg, 0.4 mg, Oral, Nightly, Brayan Epps MD, 0.4 mg at 04/26/23 2125  •  valsartan (DIOVAN) tablet 320 mg, 320 mg, Oral, Daily, Brayan Epps MD, 320 mg at 04/27/23 0846      Objective     Vital Sign Min/Max for last 24 hours  Temp  Min: 97.1 °F (36.2 °C)  Max: 97.1 °F (36.2 °C)   BP  Min: 107/68  Max: 171/92   Pulse  Min: 59  Max: 71   Resp  Min: 11  Max: 20   SpO2  Min: 91 %  Max: 100 %   No data recorded   Weight  Min: 115 kg (252 lb 9.6 oz)  Max: 115 kg (252 lb 9.6 oz)     Flowsheet Rows    Flowsheet Row First Filed Value   Admission Height 185.4 cm (73\") Documented at 04/26/2023 1325   Admission Weight 115 kg (252 lb 9.6 oz) Documented at 04/26/2023 1325          Physical Exam:     General Appearance:    Alert, cooperative, in no acute distress   Lungs:     Clear to auscultation,respirations regular, even and                unlabored    Heart:    Regular rhythm and normal rate, normal S1 and S2, no          murmur, no gallop, no rub, no click   Chest Wall:    No abnormalities observed   Abdomen:     Normal bowel sounds, no masses,  soft nontender, nondistended,    Extremities:   No gross deformities, no edema, no cyanosis,    Pulses:   Pulses palpable and equal bilaterally   Skin:   Implant site clean dry intact + hematoma NO bleeding.        Results Review:   Results from last 7 days   Lab Units 04/26/23  1343   WBC 10*3/mm3 5.34   HEMOGLOBIN g/dL 14.9   HEMATOCRIT % 44.2   PLATELETS 10*3/mm3 223     Results from last 7 days   Lab Units 04/26/23  1346   SODIUM mmol/L 140   POTASSIUM mmol/L 4.0   CHLORIDE mmol/L 102   CO2 mmol/L 26.0   BUN mg/dL 15   CREATININE mg/dL 1.04   GLUCOSE " mg/dL 147*                                Intake/Output Summary (Last 24 hours) at 4/27/2023 1050  Last data filed at 4/27/2023 0849  Gross per 24 hour   Intake 1040 ml   Output 650 ml   Net 390 ml       I personally viewed and interpreted the patient's EKG/Telemetry data    Chest X-ray: no penumothorax; acceptable lead position.     Telemetry: AV paced        Present on Admission:  **None**    Assessment & Plan   1. RV lead malfunction of ICD: s/p RV lead revision/new RV lead 4/26/2023. Pt has done well overnight. The chest Xray is unremarkable with acceptable lead position. Device site has a mild- moderate hematoma. Will hold Eliquis and follow up next week with wound check before restarting his Eliquis. Wound care and post device care have been reviewed with the patient in detail. Pt will have a wound check in 7 days then a follow up with Dr. Epps in 3 months.   2. PAF:  - currently in NSR/AV paced  - holding Eliquis    Discussed with Dr. Epps about hematoma of device site and he is ok with d/c to home and monitoring at home with wound check next week.       Plan for disposition:. The patient is stable and will be discharged to home today Pt will have a wound check in 7 days then a follow up with Dr. Epps in 3 months.       Electronically signed by KRISTEN Coelho, 04/27/23, 10:37 AM EDT.

## 2023-04-27 NOTE — CASE MANAGEMENT/SOCIAL WORK
Continued Stay Note  [facility]     Patient Name: Omar Can  MRN: [id number]  Today's Date: 4/27/2023    Admit Date: 4/26/2023    Plan: Home   Discharge Plan     Row Name 04/27/23 1253       Plan    Plan Home    Patient/Family in Agreement with Plan yes    Final Discharge Disposition Code 01 - home or self-care    Final Note Patient discharge home with private transport and no discharge needs.               Discharge Codes    No documentation.               Expected Discharge Date and Time     Expected Discharge Date Expected Discharge Time    Apr 27, 2023             Jessica Parish RN

## 2023-04-27 NOTE — PLAN OF CARE
Goal Outcome Evaluation:  Plan of Care Reviewed With: patient        Progress: no change  Outcome Evaluation: VSS. Pt remains on RA with no signs of SOA. Pt remains NSR on the monitor. Pt complained of pain once and prn meds given. No other complaints noted. Site remains unchanged.

## 2023-05-03 ENCOUNTER — TELEPHONE (OUTPATIENT)
Dept: CARDIOLOGY | Facility: CLINIC | Age: 63
End: 2023-05-03
Payer: MEDICARE

## 2023-05-03 ENCOUNTER — OFFICE VISIT (OUTPATIENT)
Dept: FAMILY MEDICINE CLINIC | Facility: CLINIC | Age: 63
End: 2023-05-03
Payer: MEDICARE

## 2023-05-03 VITALS
SYSTOLIC BLOOD PRESSURE: 129 MMHG | WEIGHT: 258.4 LBS | BODY MASS INDEX: 34.25 KG/M2 | TEMPERATURE: 97.2 F | OXYGEN SATURATION: 96 % | HEIGHT: 73 IN | HEART RATE: 61 BPM | DIASTOLIC BLOOD PRESSURE: 72 MMHG

## 2023-05-03 DIAGNOSIS — T82.110D FAILURE OF IMPLANTABLE CARDIOVERTER-DEFIBRILLATOR (ICD) LEAD, SUBSEQUENT ENCOUNTER: ICD-10-CM

## 2023-05-03 DIAGNOSIS — Z09 HOSPITAL DISCHARGE FOLLOW-UP: Primary | ICD-10-CM

## 2023-05-03 RX ORDER — CHOLECALCIFEROL (VITAMIN D3) 50 MCG
2000 TABLET ORAL DAILY
COMMUNITY

## 2023-05-03 NOTE — PROGRESS NOTES
Transitional Care Follow Up Visit  Subjective     Omar Can is a 62 y.o. male who presents for a transitional care management visit.    Within 48 business hours after discharge our office contacted him via telephone to coordinate his care and needs.      I reviewed and discussed the details of that call along with the discharge summary, hospital problems, inpatient lab results, inpatient diagnostic studies, and consultation reports with Omar.     Current outpatient and discharge medications have been reconciled for the patient.  Reviewed by: BRIDGET Carter        Risk for Readmission (LACE) Score: 4 (4/27/2023  6:00 AM)      History of Present Illness  Patient presents today for TCM visit.  Patient reports that he is doing well since his procedure.  He denies chest pains or shortness of breath.  Patient does have some soreness and bruising at surgical site, but otherwise feels that he is recovering well.     Admission (Discharged) with Brayan Epps MD (04/26/2023)    Course During Hospital Stay: Patient admitted to [facility] on 4/26/2023 for revision of ICD lead failure.  Lead revision procedure performed with no postoperative complications.  Patient discharged on 4/27/2023.     The following portions of the patient's history were reviewed and updated as appropriate: allergies, current medications, past family history, past medical history, past social history, past surgical history and problem list.    Review of Systems   Constitutional: Negative.  Negative for chills and fever.   Respiratory: Negative.    Cardiovascular: Negative for chest pain, palpitations and leg swelling.   Musculoskeletal: Negative for myalgias.   Neurological: Negative.        Objective   Physical Exam  Vitals and nursing note reviewed.   Constitutional:       General: He is not in acute distress.     Appearance: Normal appearance. He is well-developed. He is not ill-appearing, toxic-appearing or diaphoretic.    HENT:      Head: Normocephalic and atraumatic.   Cardiovascular:      Rate and Rhythm: Normal rate and regular rhythm.      Heart sounds: Normal heart sounds, S1 normal and S2 normal. No murmur heard.  Pulmonary:      Effort: Pulmonary effort is normal. No respiratory distress.      Breath sounds: Normal breath sounds. No stridor. No wheezing.   Musculoskeletal:      Right lower leg: No edema.      Left lower leg: No edema.   Skin:     General: Skin is warm and dry.   Neurological:      General: No focal deficit present.      Mental Status: He is alert and oriented to person, place, and time.   Psychiatric:         Mood and Affect: Mood normal.         Behavior: Behavior normal. Behavior is cooperative.         Thought Content: Thought content normal.         Judgment: Judgment normal.         Assessment & Plan   Diagnoses and all orders for this visit:    1. Hospital discharge follow-up (Primary)    2. Failure of implantable cardioverter-defibrillator (ICD) lead, subsequent encounter  Assessment & Plan:  Patient doing well postoperatively.  Recommend patient remain off of Eliquis until cardiology follow-up.  Patient has appointment for surgical site wound check tomorrow.  Continue routine medications.  Avoid lifting more than 5 pounds.  Avoid overexertion.      Discussed the nature of the medical condition(s) risks, complications, implications, management, safe and proper use of medications. Encouraged medication compliance, and keeping scheduled follow up appointments with me and any other providers.     Call office or RTC if symptoms fail to improve. To ER if symptoms worsen.

## 2023-05-03 NOTE — TELEPHONE ENCOUNTER
Spoke with patient. Asked him to send in a reading to pair his new device with his beside home monitor. He tried sending in the reading but kept getting an error code stating the handheld reader wasn't charging. He got the error code the last time he tried to send in a reading so I offered to order a new monitor for him. He was agreeable to this.

## 2023-05-03 NOTE — ASSESSMENT & PLAN NOTE
Patient doing well postoperatively.  Recommend patient remain off of Eliquis until cardiology follow-up.  Patient has appointment for surgical site wound check tomorrow.  Continue routine medications.  Avoid lifting more than 5 pounds.  Avoid overexertion.

## 2023-05-04 ENCOUNTER — DOCUMENTATION (OUTPATIENT)
Dept: CARDIOLOGY | Facility: CLINIC | Age: 63
End: 2023-05-04
Payer: MEDICARE

## 2023-05-04 ENCOUNTER — OFFICE VISIT (OUTPATIENT)
Dept: CARDIOLOGY | Facility: CLINIC | Age: 63
End: 2023-05-04
Payer: MEDICARE

## 2023-05-04 DIAGNOSIS — Z95.810 BIVENTRICULAR IMPLANTABLE CARDIOVERTER-DEFIBRILLATOR (ICD) IN SITU: Primary | Chronic | ICD-10-CM

## 2023-05-04 NOTE — PROGRESS NOTES
Patient came in today for a wound check after LV lead revision.  At discharge he had a moderate hematoma for which Eliquis has been held.  I was asked by nursing staff to evaluate the patient due to persistent moderate to large sized hematoma.  The incision is well approximated without discharge.  He continues to have quite large, soft hematoma.  The patient states that that has reduced in size significantly.  I think his Eliquis should be held for another week with recheck in 1 week.    Electronically signed by KRISTEN Farrell, 05/04/23, 3:29 PM EDT.

## 2023-05-04 NOTE — PROGRESS NOTES
2023    Omar Can, : 1960      Fever: No    Temperature if indicated:     Wound Location: Left Infraclavicular    Dressing Removed: Removed by MA/RN      Old Dressing Appearance:  Clean, dry    Wound Appearance: Redness []                  Drainage []                  Culture obtained []        Color: N/A     Consistency: n/a     Amount: none         Gloves used, wound cleansed with sterile 4x4 and peroxide [x]       MD notified []     MD orders:     Antibiotic started []      If checked, type     Other:       Appointment for follow-up scheduled for 3 months post procedure []    Future Appointments   Date Time Provider Department Center   2023  9:00 AM WOUND CHECK [facility]   2023  9:30 AM Eder Magana PA [facility]   2023  2:00 PM Diana Pardo APRN [facility]   2024 10:30 AM Charan Durham APRN [facility]           Leslie Smith MA, 23      MD Signature:______________________________ Completed By/Date:

## 2023-05-11 ENCOUNTER — OFFICE VISIT (OUTPATIENT)
Dept: CARDIOLOGY | Facility: CLINIC | Age: 63
End: 2023-05-11
Payer: MEDICARE

## 2023-05-11 DIAGNOSIS — I48.0 PAROXYSMAL ATRIAL FIBRILLATION: ICD-10-CM

## 2023-05-11 DIAGNOSIS — I44.0 ATRIOVENTRICULAR BLOCK, FIRST DEGREE: Primary | ICD-10-CM

## 2023-05-11 DIAGNOSIS — Z95.810 BIVENTRICULAR IMPLANTABLE CARDIOVERTER-DEFIBRILLATOR (ICD) IN SITU: Chronic | ICD-10-CM

## 2023-05-12 NOTE — PROGRESS NOTES
Pt here for nursing visit after pacemaker placement.  He has a hematoma.  Jeffrey Parks PA-C looked at his incision and problems today.  Advised him to restart his eliquis and to be easy doing things.

## 2023-05-13 PROCEDURE — 93296 REM INTERROG EVL PM/IDS: CPT | Performed by: STUDENT IN AN ORGANIZED HEALTH CARE EDUCATION/TRAINING PROGRAM

## 2023-05-13 PROCEDURE — 93295 DEV INTERROG REMOTE 1/2/MLT: CPT | Performed by: STUDENT IN AN ORGANIZED HEALTH CARE EDUCATION/TRAINING PROGRAM

## 2023-05-14 DIAGNOSIS — Z76.0 MEDICATION REFILL: ICD-10-CM

## 2023-05-14 DIAGNOSIS — E11.9 TYPE 2 DIABETES MELLITUS TREATED WITHOUT INSULIN: ICD-10-CM

## 2023-05-15 RX ORDER — GLIMEPIRIDE 4 MG/1
4 TABLET ORAL 2 TIMES DAILY
Qty: 180 TABLET | Refills: 0 | Status: SHIPPED | OUTPATIENT
Start: 2023-05-15

## 2023-06-23 PROBLEM — T84.84XA: Status: ACTIVE | Noted: 2020-03-06

## 2023-06-23 PROBLEM — Z96.659: Status: ACTIVE | Noted: 2020-03-06

## 2023-06-25 PROBLEM — R07.9 CHEST PAIN: Status: ACTIVE | Noted: 2023-06-25

## 2023-07-24 ENCOUNTER — HOSPITAL ENCOUNTER (OUTPATIENT)
Dept: CARDIOLOGY | Facility: HOSPITAL | Age: 63
Discharge: HOME | End: 2023-07-24
Payer: MEDICARE

## 2023-07-24 VITALS — WEIGHT: 260 LBS | BODY MASS INDEX: 35.21 KG/M2 | HEIGHT: 72 IN

## 2023-07-24 DIAGNOSIS — R94.31 ABNORMAL ELECTROCARDIOGRAM (ECG) (EKG): ICD-10-CM

## 2023-07-24 DIAGNOSIS — I25.119 CORONARY ARTERY DISEASE INVOLVING NATIVE CORONARY ARTERY OF NATIVE HEART WITH ANGINA PECTORIS: ICD-10-CM

## 2023-07-24 LAB
BH CV ECHO MEAS - AI P1/2T: 625 MSEC
BH CV ECHO MEAS - AO MAX PG: 10.2 MMHG
BH CV ECHO MEAS - AO MEAN PG: 6 MMHG
BH CV ECHO MEAS - AO ROOT DIAM: 3.5 CM
BH CV ECHO MEAS - AO V2 MAX: 160 CM/SEC
BH CV ECHO MEAS - AO V2 VTI: 33.8 CM
BH CV ECHO MEAS - AVA(I,D): 2.38 CM2
BH CV ECHO MEAS - EDV(CUBED): 166.4 ML
BH CV ECHO MEAS - EDV(MOD-SP2): 131 ML
BH CV ECHO MEAS - EDV(MOD-SP4): 129 ML
BH CV ECHO MEAS - EF(MOD-BP): 68.4 %
BH CV ECHO MEAS - EF(MOD-SP2): 71.8 %
BH CV ECHO MEAS - EF(MOD-SP4): 61.2 %
BH CV ECHO MEAS - ESV(CUBED): 54.9 ML
BH CV ECHO MEAS - ESV(MOD-SP2): 37 ML
BH CV ECHO MEAS - ESV(MOD-SP4): 50.1 ML
BH CV ECHO MEAS - FS: 30.9 %
BH CV ECHO MEAS - IVS/LVPW: 0.87 CM
BH CV ECHO MEAS - IVSD: 1.3 CM
BH CV ECHO MEAS - LA DIMENSION: 5.4 CM
BH CV ECHO MEAS - LAT PEAK E' VEL: 9.7 CM/SEC
BH CV ECHO MEAS - LV DIASTOLIC VOL/BSA (35-75): 54.2 CM2
BH CV ECHO MEAS - LV MASS(C)D: 337.9 GRAMS
BH CV ECHO MEAS - LV MAX PG: 6.6 MMHG
BH CV ECHO MEAS - LV MEAN PG: 3 MMHG
BH CV ECHO MEAS - LV SYSTOLIC VOL/BSA (12-30): 21 CM2
BH CV ECHO MEAS - LV V1 MAX: 128 CM/SEC
BH CV ECHO MEAS - LV V1 VTI: 25.6 CM
BH CV ECHO MEAS - LVIDD: 5.5 CM
BH CV ECHO MEAS - LVIDS: 3.8 CM
BH CV ECHO MEAS - LVOT AREA: 3.1 CM2
BH CV ECHO MEAS - LVOT DIAM: 2 CM
BH CV ECHO MEAS - LVPWD: 1.3 CM
BH CV ECHO MEAS - MED PEAK E' VEL: 5 CM/SEC
BH CV ECHO MEAS - MV A MAX VEL: 63.4 CM/SEC
BH CV ECHO MEAS - MV DEC SLOPE: 314 CM/SEC2
BH CV ECHO MEAS - MV DEC TIME: 0.23 MSEC
BH CV ECHO MEAS - MV E MAX VEL: 76.5 CM/SEC
BH CV ECHO MEAS - MV E/A: 1.21
BH CV ECHO MEAS - MV MAX PG: 3 MMHG
BH CV ECHO MEAS - MV MEAN PG: 1 MMHG
BH CV ECHO MEAS - MV P1/2T: 94.2 MSEC
BH CV ECHO MEAS - MV V2 VTI: 31.2 CM
BH CV ECHO MEAS - MVA(P1/2T): 2.34 CM2
BH CV ECHO MEAS - MVA(VTI): 2.6 CM2
BH CV ECHO MEAS - PA ACC TIME: 0.15 SEC
BH CV ECHO MEAS - RAP SYSTOLE: 3 MMHG
BH CV ECHO MEAS - RVSP: 30 MMHG
BH CV ECHO MEAS - SI(MOD-SP2): 39.5 ML/M2
BH CV ECHO MEAS - SI(MOD-SP4): 33.1 ML/M2
BH CV ECHO MEAS - SV(LVOT): 80.4 ML
BH CV ECHO MEAS - SV(MOD-SP2): 94 ML
BH CV ECHO MEAS - SV(MOD-SP4): 78.9 ML
BH CV ECHO MEAS - TAPSE (>1.6): 2.43 CM
BH CV ECHO MEAS - TR MAX PG: 26.5 MMHG
BH CV ECHO MEAS - TR MAX VEL: 255.3 CM/SEC
BH CV ECHO MEASUREMENTS AVERAGE E/E' RATIO: 10.41
BH CV VAS BP RIGHT ARM: NORMAL MMHG
BH CV XLRA - RV BASE: 4.9 CM
BH CV XLRA - RV LENGTH: 9.2 CM
BH CV XLRA - RV MID: 4 CM
BH CV XLRA - TDI S': 17.2 CM/SEC
LEFT ATRIUM VOLUME INDEX: 42.9 ML/M2
LV EF 2D ECHO EST: 60 %

## 2023-07-24 PROCEDURE — 25010000002 REGADENOSON 0.4 MG/5ML SOLUTION: Performed by: PHYSICIAN ASSISTANT

## 2023-07-24 PROCEDURE — 78452 HT MUSCLE IMAGE SPECT MULT: CPT

## 2023-07-24 PROCEDURE — 78452 HT MUSCLE IMAGE SPECT MULT: CPT | Performed by: INTERNAL MEDICINE

## 2023-07-24 PROCEDURE — 93306 TTE W/DOPPLER COMPLETE: CPT | Performed by: INTERNAL MEDICINE

## 2023-07-24 PROCEDURE — A9500 TC99M SESTAMIBI: HCPCS | Performed by: PHYSICIAN ASSISTANT

## 2023-07-24 PROCEDURE — 93017 CV STRESS TEST TRACING ONLY: CPT

## 2023-07-24 PROCEDURE — 0 TECHNETIUM SESTAMIBI: Performed by: PHYSICIAN ASSISTANT

## 2023-07-24 PROCEDURE — 93018 CV STRESS TEST I&R ONLY: CPT | Performed by: INTERNAL MEDICINE

## 2023-07-24 PROCEDURE — 93306 TTE W/DOPPLER COMPLETE: CPT

## 2023-07-24 RX ORDER — REGADENOSON 0.08 MG/ML
0.4 INJECTION, SOLUTION INTRAVENOUS ONCE
Status: COMPLETED | OUTPATIENT
Start: 2023-07-24 | End: 2023-07-24

## 2023-07-24 RX ADMIN — TECHNETIUM TC 99M SESTAMIBI 1 DOSE: 1 INJECTION INTRAVENOUS at 12:20

## 2023-07-24 RX ADMIN — TECHNETIUM TC 99M SESTAMIBI 1 DOSE: 1 INJECTION INTRAVENOUS at 10:38

## 2023-07-24 RX ADMIN — REGADENOSON 0.4 MG: 0.08 INJECTION, SOLUTION INTRAVENOUS at 12:17

## 2023-07-25 ENCOUNTER — TELEPHONE (OUTPATIENT)
Dept: CARDIOLOGY | Facility: CLINIC | Age: 63
End: 2023-07-25
Payer: MEDICARE

## 2023-07-25 LAB
BH CV REST NUCLEAR ISOTOPE DOSE: 9.5 MCI
BH CV STRESS BP STAGE 2: NORMAL
BH CV STRESS BP STAGE 4: NORMAL
BH CV STRESS COMMENTS STAGE 1: NORMAL
BH CV STRESS DOSE REGADENOSON STAGE 1: 0.4
BH CV STRESS DURATION MIN STAGE 1: 1
BH CV STRESS DURATION MIN STAGE 2: 1
BH CV STRESS DURATION MIN STAGE 3: 1
BH CV STRESS DURATION MIN STAGE 4: 1
BH CV STRESS DURATION SEC STAGE 1: 0
BH CV STRESS DURATION SEC STAGE 2: 0
BH CV STRESS DURATION SEC STAGE 3: 0
BH CV STRESS DURATION SEC STAGE 4: 0
BH CV STRESS HR STAGE 1: 60
BH CV STRESS HR STAGE 2: 64
BH CV STRESS HR STAGE 3: 60
BH CV STRESS HR STAGE 4: 61
BH CV STRESS NUCLEAR ISOTOPE DOSE: 32.8 MCI
BH CV STRESS O2 STAGE 1: 100
BH CV STRESS O2 STAGE 2: 98
BH CV STRESS O2 STAGE 3: 99
BH CV STRESS O2 STAGE 4: 99
BH CV STRESS PROTOCOL 1: NORMAL
BH CV STRESS RECOVERY BP: NORMAL MMHG
BH CV STRESS RECOVERY HR: 61 BPM
BH CV STRESS RECOVERY O2: 98 %
BH CV STRESS STAGE 1: 1
BH CV STRESS STAGE 2: 2
BH CV STRESS STAGE 3: 3
BH CV STRESS STAGE 4: 4
LV EF NUC BP: 58 %
MAXIMAL PREDICTED HEART RATE: 155 BPM
PERCENT MAX PREDICTED HR: 41.94 %
STRESS BASELINE BP: NORMAL MMHG
STRESS BASELINE HR: 62 BPM
STRESS O2 SAT REST: 97 %
STRESS PERCENT HR: 49 %
STRESS POST ESTIMATED WORKLOAD: 1 METS
STRESS POST EXERCISE DUR MIN: 4 MIN
STRESS POST EXERCISE DUR SEC: 0 SEC
STRESS POST O2 SAT PEAK: 99 %
STRESS POST PEAK BP: NORMAL MMHG
STRESS POST PEAK HR: 65 BPM
STRESS TARGET HR: 132 BPM

## 2023-07-25 NOTE — TELEPHONE ENCOUNTER
Called patient about these results.  He does have a follow-up appointment with Marko Magana in 2 days.  Patient states he is no longer having any chest pain.  I discussed with him at that appointment he will need to schedule a follow-up appointment with Dr. Benavides.  If the patient develops worsening symptoms may schedule for heart catheterization in the meantime.

## 2023-07-26 DIAGNOSIS — E78.5 HYPERLIPIDEMIA, UNSPECIFIED HYPERLIPIDEMIA TYPE: ICD-10-CM

## 2023-07-26 DIAGNOSIS — E11.9 TYPE 2 DIABETES MELLITUS TREATED WITHOUT INSULIN: ICD-10-CM

## 2023-07-26 DIAGNOSIS — Z76.0 MEDICATION REFILL: ICD-10-CM

## 2023-07-26 RX ORDER — LANCETS
EACH MISCELLANEOUS
Qty: 100 EACH | Refills: 12 | Status: SHIPPED | OUTPATIENT
Start: 2023-07-26

## 2023-07-26 RX ORDER — ATORVASTATIN CALCIUM 40 MG/1
40 TABLET, FILM COATED ORAL
Qty: 90 TABLET | Refills: 0 | Status: SHIPPED | OUTPATIENT
Start: 2023-07-26

## 2023-07-26 RX ORDER — BLOOD SUGAR DIAGNOSTIC
STRIP MISCELLANEOUS
Qty: 100 EACH | Refills: 0 | Status: SHIPPED | OUTPATIENT
Start: 2023-07-26

## 2023-07-27 ENCOUNTER — PREP FOR SURGERY (OUTPATIENT)
Dept: OTHER | Facility: HOSPITAL | Age: 63
End: 2023-07-27
Payer: MEDICARE

## 2023-07-27 ENCOUNTER — OFFICE VISIT (OUTPATIENT)
Dept: CARDIOLOGY | Facility: CLINIC | Age: 63
End: 2023-07-27
Payer: MEDICARE

## 2023-07-27 VITALS
WEIGHT: 263 LBS | HEART RATE: 79 BPM | OXYGEN SATURATION: 93 % | HEIGHT: 72 IN | BODY MASS INDEX: 35.62 KG/M2 | SYSTOLIC BLOOD PRESSURE: 142 MMHG | DIASTOLIC BLOOD PRESSURE: 72 MMHG

## 2023-07-27 DIAGNOSIS — I10 PRIMARY HYPERTENSION: Primary | Chronic | ICD-10-CM

## 2023-07-27 DIAGNOSIS — I47.20 VT (VENTRICULAR TACHYCARDIA): ICD-10-CM

## 2023-07-27 RX ORDER — ASPIRIN 81 MG/1
324 TABLET, CHEWABLE ORAL ONCE
Status: CANCELLED | OUTPATIENT
Start: 2023-07-27 | End: 2023-07-27

## 2023-07-27 RX ORDER — SODIUM CHLORIDE 0.9 % (FLUSH) 0.9 %
10 SYRINGE (ML) INJECTION EVERY 12 HOURS SCHEDULED
Status: CANCELLED | OUTPATIENT
Start: 2023-07-27

## 2023-07-27 RX ORDER — ASPIRIN 81 MG/1
81 TABLET ORAL DAILY
Status: CANCELLED | OUTPATIENT
Start: 2023-07-28

## 2023-07-27 RX ORDER — ONDANSETRON 4 MG/1
4 TABLET, ORALLY DISINTEGRATING ORAL EVERY 8 HOURS PRN
COMMUNITY

## 2023-07-27 RX ORDER — SODIUM CHLORIDE 9 MG/ML
40 INJECTION, SOLUTION INTRAVENOUS AS NEEDED
Status: CANCELLED | OUTPATIENT
Start: 2023-07-27

## 2023-07-27 RX ORDER — NITROGLYCERIN 0.4 MG/1
0.4 TABLET SUBLINGUAL
Status: CANCELLED | OUTPATIENT
Start: 2023-07-27

## 2023-07-27 RX ORDER — ACETAMINOPHEN 325 MG/1
650 TABLET ORAL EVERY 4 HOURS PRN
Status: CANCELLED | OUTPATIENT
Start: 2023-07-27

## 2023-07-27 RX ORDER — SODIUM CHLORIDE 0.9 % (FLUSH) 0.9 %
10 SYRINGE (ML) INJECTION AS NEEDED
Status: CANCELLED | OUTPATIENT
Start: 2023-07-27

## 2023-07-27 NOTE — PROGRESS NOTES
[facility]   OFFICE NOTE      Omar Can  1960  PCP: Diana Pardo APRN    SUBJECTIVE:   Omar Can is a 63 y.o. male seen for a follow up visit regarding the following:     CC:VF    HPI:   Pleasant 63-year-old gent returns office today for follow-up regarding ischemic cardiomyopathy, Medtronic ICD interrogation, new onset atrial fibrillation, recent ICD shocks for VF.  The patient is admitted to [facility] ER June 24, 2023 after a VF and syncope event.  Lab today at the time was stable.  He had atypical chest pain.  He was discharged home with scheduled follow-up stress test that revealed inferior wall ischemia.  Today on interrogation on his device he has had 2 more VF events July 6, 2023 and July 11, 2023.  He is unaware of these ICD shocks.  He does not recall having them.  He is have some shortness of breath but denies chest pain.  Denies worsening heart failure type symptoms he is taking his medications as scheduled.    Cardiac PMH: (Old records have been reviewed and summarized below)  Ischemic heart disease  Cardiac arrest admission to [facility] March 26, 2010 with left heart catheterization by Dr. MIKE with emergent catheter-based intervention.  (Database insufficiency)  Repeat left heart catheterization 48 hours with catheter-based invention 2 stents other vessels (database insufficiency)  MDT BIVICD placed 2010 [facility] (database insufficiency ) Dr. Quiroga  Medtronic BIVIICD generator change November 7, 2019  Echocardiogram May 20, 2021 LVH EF 60% mild AI  Normal myocardial perfusion study May 20, 2021 EF calculated 42%.  RV Lead Revision 4/26/2023 Dr. Epps 6/28/2023  Myocardial perfusion imaging indicates ischemia inferior wall and inferolateral wall EF 50% July 24, 2023  Echocardiogram July 20 1423 normal LV function mild LVH, bright thickened echogenic area mobile on RV lead most likely  thrombus  VF ICD shock ICD  x 3 Most recent 7/11/2023   New onset A. fib: Documented by interrogation today August 2021 episode lasted 13 hours.  Hypertension  Hyperlipidemia  BPH, recent cystoscope and recent scope with Laser for prostate. Dr. Ponce  Diabetes mellitus type 2  Obstructive sleep apnea, Compliance with CPAP    Past Medical History, Past Surgical History, Family history, Social History, and Medications were all reviewed with the patient today and updated as necessary.       Current Outpatient Medications:     amLODIPine (NORVASC) 10 MG tablet, Take 1 tablet by mouth Daily., Disp: , Rfl:     apixaban (ELIQUIS) 5 MG tablet tablet, Take 1 tablet by mouth 2 (Two) Times a Day., Disp: , Rfl:     Aspirin 81 MG capsule, Take 81 mg by mouth Daily., Disp: , Rfl:     atorvastatin (LIPITOR) 40 MG tablet, Take 1 tablet by mouth every night at bedtime., Disp: 90 tablet, Rfl: 0    carvedilol (COREG) 25 MG tablet, Take 1 tablet by mouth 2 (Two) Times a Day With Meals., Disp: , Rfl:     Cholecalciferol (Vitamin D) 50 MCG (2000 UT) tablet, Take 1 tablet by mouth Daily., Disp: , Rfl:     coenzyme Q10 100 MG capsule, Take 1 capsule by mouth Daily., Disp: , Rfl:     furosemide (LASIX) 20 MG tablet, Take 1 tablet by mouth As Needed., Disp: , Rfl:     glimepiride (AMARYL) 4 MG tablet, TAKE 1 TABLET BY MOUTH 2 TIMES A DAY, Disp: 180 tablet, Rfl: 0    glucose blood (OneTouch Verio) test strip, USE TO CHECK BLOOD GLUCOSE ONCE DAILY OR AS DIRECTED, Disp: 100 each, Rfl: 0    glucose monitor monitoring kit, 1 each As Needed (for diabetes)., Disp: 1 each, Rfl: 0    hydrALAZINE (APRESOLINE) 50 MG tablet, Take 1 tablet by mouth Every 8 (Eight) Hours., Disp: , Rfl:     hydrOXYzine (ATARAX) 25 MG tablet, Take 1 tablet by mouth At Night As Needed (insomnia)., Disp: 30 tablet, Rfl: 2    Lancets (onetouch ultrasoft) lancets, Check daily and prn, Disp: 100 each, Rfl: 12    metFORMIN (GLUCOPHAGE) 850 MG tablet, TAKE 1/2 TABLET BY MOUTH 2  "TIMES A DAY, Disp: 90 tablet, Rfl: 0    Multiple Vitamin (MULTI-VITAMIN DAILY PO), Take 1 tablet by mouth Daily., Disp: , Rfl:     nitroglycerin (NITROSTAT) 0.3 MG SL tablet, Place 1 tablet under the tongue Every 5 (Five) Minutes As Needed for Chest Pain. Take no more than 3 doses in 15 minutes., Disp: 30 tablet, Rfl: 1    Potassium Gluconate 2 MEQ tablet, Take 2 mEq by mouth Daily., Disp: , Rfl:     Semaglutide,0.25 or 0.5MG/DOS, (Ozempic, 0.25 or 0.5 MG/DOSE,) 2 MG/3ML solution pen-injector, Inject 0.25 mg under the skin into the appropriate area as directed 1 (One) Time Per Week., Disp: 33 mL, Rfl: 0    spironolactone (ALDACTONE) 25 MG tablet, Take 1 tablet by mouth Daily., Disp: 30 tablet, Rfl: 11    valsartan (DIOVAN) 320 MG tablet, Take 1 tablet by mouth Daily., Disp: , Rfl:     ondansetron ODT (ZOFRAN-ODT) 4 MG disintegrating tablet, Place 1 tablet on the tongue Every 8 (Eight) Hours As Needed., Disp: , Rfl:     tamsulosin (FLOMAX) 0.4 MG capsule 24 hr capsule, Take 1 capsule by mouth Daily., Disp: 90 capsule, Rfl: 2      Allergies   Allergen Reactions    Cyclobenzaprine Hives    Floxacillin (Flucloxacillin) Hives         PHYSICAL EXAM:    /72 (BP Location: Left arm, Patient Position: Sitting)   Pulse 79   Ht 182.9 cm (72.01\")   Wt 119 kg (263 lb)   SpO2 93%   BMI 35.66 kg/m²        Wt Readings from Last 5 Encounters:   07/27/23 119 kg (263 lb)   07/24/23 118 kg (260 lb)   06/23/23 118 kg (260 lb 3.2 oz)   06/21/23 115 kg (253 lb)   05/03/23 117 kg (258 lb 6.4 oz)       BP Readings from Last 5 Encounters:   07/27/23 142/72   06/23/23 142/78   06/21/23 139/80   05/03/23 129/72   04/27/23 140/70       General appearance - Alert, well appearing, and in no distress   Mental status - Affect appropriate to mood.  Eyes - Sclerae anicteric,  ENMT - Hearing grossly normal bilaterally, Dental hygiene good.  Neck - Carotids upstroke normal bilaterally, no bruits, no JVD.  Resp - Clear to auscultation, no " wheezes, rales or rhonchi, symmetric air entry.  Heart - Normal rate, regular rhythm, normal S1, S2, no murmurs, rubs, clicks or gallops.  GI - Soft, nontender, nondistended, no masses or organomegaly.  Neurological - Grossly intact - normal speech, no focal findings  Musculoskeletal - No joint tenderness, deformity or swelling, no muscular tenderness noted.  Extremities - Peripheral pulses normal, no pedal edema, no clubbing or cyanosis.  Skin - Normal coloration and turgor.  Psych -  oriented to person, place, and time.    Medical problems and test results were reviewed with the patient today.       Device Interrogation:  Please see device interrogation form which has been signed and updated for full details.  Medtronic BiV ICD.  Rate set at 60.  A paced 88%.  BiV paced 9 9%.  Normal thresholds impedance.  Battery voltage is 7.1 years remaining.  Underlying rhythm is sinus.  2 VF episodes with successful shock.  July 11, 2023 and July 5, 2023.  All shock factors changed to be X.  Electroshock also updated OR 1.    ASSESSMENT   1. VF x 3, ICD shocks.  Abnormal stress test revealing inferior wall ischemia.  In view of recurrent VF shocks abnormal stress test recommend left heart catheterization possible cath-based intervention.  Continue current medical therapy.    2. ICM: Normal EF by Echo. Abnormal Stress test Inferior wall Ischemia. EF     3. Afib: Chadsvasc=3, Eliquis 5mg BId    4. HTN: Controlled, blood pressure today 140 systolic he is on multiple medications    PLAN  Left heart catheterization possible cath-based intervention.We discussed the procedure in great detail including risks, benefits, and alternatives. The patient understands that risks include bleeding, infection, stroke, MI, cardiac perforation, and even death.  He would hold Eliquis starting today catheterization to be scheduled for tomorrow July 28, 2023 continue other medications.        7/27/2023  15:28 EDT    Electronically signed by Eder  KRISTEN Cheng, 07/27/23, 3:31 PM EDT.

## 2023-07-27 NOTE — H&P (VIEW-ONLY)
[facility] at [facility]   OFFICE NOTE      Omar Can  1960  PCP: Diana Pardo APRN    SUBJECTIVE:   Omar Can is a 63 y.o. male seen for a follow up visit regarding the following:     CC:VF    HPI:   Pleasant 63-year-old gent returns office today for follow-up regarding ischemic cardiomyopathy, Medtronic ICD interrogation, new onset atrial fibrillation, recent ICD shocks for VF.  The patient is admitted to [facility] ER June 24, 2023 after a VF and syncope event.  Lab today at the time was stable.  He had atypical chest pain.  He was discharged home with scheduled follow-up stress test that revealed inferior wall ischemia.  Today on interrogation on his device he has had 2 more VF events July 6, 2023 and July 11, 2023.  He is unaware of these ICD shocks.  He does not recall having them.  He is have some shortness of breath but denies chest pain.  Denies worsening heart failure type symptoms he is taking his medications as scheduled.    Cardiac PMH: (Old records have been reviewed and summarized below)  Ischemic heart disease  Cardiac arrest admission to [facility] March 26, 2010 with left heart catheterization by Dr. MIKE with emergent catheter-based intervention.  (Database insufficiency)  Repeat left heart catheterization 48 hours with catheter-based invention 2 stents other vessels (database insufficiency)  MDT BIVICD placed 2010 [facility] (database insufficiency ) Dr. Quiroga  Medtronic BIVIICD generator change November 7, 2019  Echocardiogram May 20, 2021 LVH EF 60% mild AI  Normal myocardial perfusion study May 20, 2021 EF calculated 42%.  RV Lead Revision 4/26/2023 Dr. Epps 6/28/2023  Myocardial perfusion imaging indicates ischemia inferior wall and inferolateral wall EF 50% July 24, 2023  Echocardiogram July 20 1423 normal LV function mild LVH, bright thickened echogenic area mobile on RV lead most likely  thrombus  VF ICD shock ICD  x 3 Most recent 7/11/2023   New onset A. fib: Documented by interrogation today August 2021 episode lasted 13 hours.  Hypertension  Hyperlipidemia  BPH, recent cystoscope and recent scope with Laser for prostate. Dr. Ponce  Diabetes mellitus type 2  Obstructive sleep apnea, Compliance with CPAP    Past Medical History, Past Surgical History, Family history, Social History, and Medications were all reviewed with the patient today and updated as necessary.       Current Outpatient Medications:     amLODIPine (NORVASC) 10 MG tablet, Take 1 tablet by mouth Daily., Disp: , Rfl:     apixaban (ELIQUIS) 5 MG tablet tablet, Take 1 tablet by mouth 2 (Two) Times a Day., Disp: , Rfl:     Aspirin 81 MG capsule, Take 81 mg by mouth Daily., Disp: , Rfl:     atorvastatin (LIPITOR) 40 MG tablet, Take 1 tablet by mouth every night at bedtime., Disp: 90 tablet, Rfl: 0    carvedilol (COREG) 25 MG tablet, Take 1 tablet by mouth 2 (Two) Times a Day With Meals., Disp: , Rfl:     Cholecalciferol (Vitamin D) 50 MCG (2000 UT) tablet, Take 1 tablet by mouth Daily., Disp: , Rfl:     coenzyme Q10 100 MG capsule, Take 1 capsule by mouth Daily., Disp: , Rfl:     furosemide (LASIX) 20 MG tablet, Take 1 tablet by mouth As Needed., Disp: , Rfl:     glimepiride (AMARYL) 4 MG tablet, TAKE 1 TABLET BY MOUTH 2 TIMES A DAY, Disp: 180 tablet, Rfl: 0    glucose blood (OneTouch Verio) test strip, USE TO CHECK BLOOD GLUCOSE ONCE DAILY OR AS DIRECTED, Disp: 100 each, Rfl: 0    glucose monitor monitoring kit, 1 each As Needed (for diabetes)., Disp: 1 each, Rfl: 0    hydrALAZINE (APRESOLINE) 50 MG tablet, Take 1 tablet by mouth Every 8 (Eight) Hours., Disp: , Rfl:     hydrOXYzine (ATARAX) 25 MG tablet, Take 1 tablet by mouth At Night As Needed (insomnia)., Disp: 30 tablet, Rfl: 2    Lancets (onetouch ultrasoft) lancets, Check daily and prn, Disp: 100 each, Rfl: 12    metFORMIN (GLUCOPHAGE) 850 MG tablet, TAKE 1/2 TABLET BY MOUTH 2  "TIMES A DAY, Disp: 90 tablet, Rfl: 0    Multiple Vitamin (MULTI-VITAMIN DAILY PO), Take 1 tablet by mouth Daily., Disp: , Rfl:     nitroglycerin (NITROSTAT) 0.3 MG SL tablet, Place 1 tablet under the tongue Every 5 (Five) Minutes As Needed for Chest Pain. Take no more than 3 doses in 15 minutes., Disp: 30 tablet, Rfl: 1    Potassium Gluconate 2 MEQ tablet, Take 2 mEq by mouth Daily., Disp: , Rfl:     Semaglutide,0.25 or 0.5MG/DOS, (Ozempic, 0.25 or 0.5 MG/DOSE,) 2 MG/3ML solution pen-injector, Inject 0.25 mg under the skin into the appropriate area as directed 1 (One) Time Per Week., Disp: 33 mL, Rfl: 0    spironolactone (ALDACTONE) 25 MG tablet, Take 1 tablet by mouth Daily., Disp: 30 tablet, Rfl: 11    valsartan (DIOVAN) 320 MG tablet, Take 1 tablet by mouth Daily., Disp: , Rfl:     ondansetron ODT (ZOFRAN-ODT) 4 MG disintegrating tablet, Place 1 tablet on the tongue Every 8 (Eight) Hours As Needed., Disp: , Rfl:     tamsulosin (FLOMAX) 0.4 MG capsule 24 hr capsule, Take 1 capsule by mouth Daily., Disp: 90 capsule, Rfl: 2      Allergies   Allergen Reactions    Cyclobenzaprine Hives    Floxacillin (Flucloxacillin) Hives         PHYSICAL EXAM:    /72 (BP Location: Left arm, Patient Position: Sitting)   Pulse 79   Ht 182.9 cm (72.01\")   Wt 119 kg (263 lb)   SpO2 93%   BMI 35.66 kg/m²        Wt Readings from Last 5 Encounters:   07/27/23 119 kg (263 lb)   07/24/23 118 kg (260 lb)   06/23/23 118 kg (260 lb 3.2 oz)   06/21/23 115 kg (253 lb)   05/03/23 117 kg (258 lb 6.4 oz)       BP Readings from Last 5 Encounters:   07/27/23 142/72   06/23/23 142/78   06/21/23 139/80   05/03/23 129/72   04/27/23 140/70       General appearance - Alert, well appearing, and in no distress   Mental status - Affect appropriate to mood.  Eyes - Sclerae anicteric,  ENMT - Hearing grossly normal bilaterally, Dental hygiene good.  Neck - Carotids upstroke normal bilaterally, no bruits, no JVD.  Resp - Clear to auscultation, no " wheezes, rales or rhonchi, symmetric air entry.  Heart - Normal rate, regular rhythm, normal S1, S2, no murmurs, rubs, clicks or gallops.  GI - Soft, nontender, nondistended, no masses or organomegaly.  Neurological - Grossly intact - normal speech, no focal findings  Musculoskeletal - No joint tenderness, deformity or swelling, no muscular tenderness noted.  Extremities - Peripheral pulses normal, no pedal edema, no clubbing or cyanosis.  Skin - Normal coloration and turgor.  Psych -  oriented to person, place, and time.    Medical problems and test results were reviewed with the patient today.       Device Interrogation:  Please see device interrogation form which has been signed and updated for full details.  Medtronic BiV ICD.  Rate set at 60.  A paced 88%.  BiV paced 9 9%.  Normal thresholds impedance.  Battery voltage is 7.1 years remaining.  Underlying rhythm is sinus.  2 VF episodes with successful shock.  July 11, 2023 and July 5, 2023.  All shock factors changed to be X.  Electroshock also updated OR 1.    ASSESSMENT   1. VF x 3, ICD shocks.  Abnormal stress test revealing inferior wall ischemia.  In view of recurrent VF shocks abnormal stress test recommend left heart catheterization possible cath-based intervention.  Continue current medical therapy.    2. ICM: Normal EF by Echo. Abnormal Stress test Inferior wall Ischemia. EF     3. Afib: Chadsvasc=3, Eliquis 5mg BId    4. HTN: Controlled, blood pressure today 140 systolic he is on multiple medications    PLAN  Left heart catheterization possible cath-based intervention.We discussed the procedure in great detail including risks, benefits, and alternatives. The patient understands that risks include bleeding, infection, stroke, MI, cardiac perforation, and even death.  He would hold Eliquis starting today catheterization to be scheduled for tomorrow July 28, 2023 continue other medications.        7/27/2023  15:28 EDT    Electronically signed by Eder  KRISTEN Cheng, 07/27/23, 3:31 PM EDT.

## 2023-07-28 ENCOUNTER — HOSPITAL ENCOUNTER (OUTPATIENT)
Facility: HOSPITAL | Age: 63
Setting detail: HOSPITAL OUTPATIENT SURGERY
Discharge: HOME | End: 2023-07-28
Attending: INTERNAL MEDICINE | Admitting: INTERNAL MEDICINE
Payer: MEDICARE

## 2023-07-28 VITALS
RESPIRATION RATE: 18 BRPM | OXYGEN SATURATION: 96 % | TEMPERATURE: 97.2 F | DIASTOLIC BLOOD PRESSURE: 82 MMHG | HEIGHT: 73 IN | BODY MASS INDEX: 33.66 KG/M2 | HEART RATE: 60 BPM | WEIGHT: 253.97 LBS | SYSTOLIC BLOOD PRESSURE: 137 MMHG

## 2023-07-28 DIAGNOSIS — I25.10 CORONARY ARTERY DISEASE INVOLVING NATIVE CORONARY ARTERY OF NATIVE HEART WITHOUT ANGINA PECTORIS: Primary | Chronic | ICD-10-CM

## 2023-07-28 DIAGNOSIS — I47.20 VT (VENTRICULAR TACHYCARDIA): ICD-10-CM

## 2023-07-28 DIAGNOSIS — I51.9 HEART DISEASE: ICD-10-CM

## 2023-07-28 LAB
ACT BLD: 251 SECONDS (ref 82–152)
ACT BLD: 257 SECONDS (ref 82–152)
ACT BLD: 269 SECONDS (ref 82–152)
ACT BLD: 287 SECONDS (ref 82–152)
ACT BLD: 317 SECONDS (ref 82–152)
ALBUMIN SERPL-MCNC: 4.3 G/DL (ref 3.5–5.2)
ALBUMIN/GLOB SERPL: 1.7 G/DL
ALP SERPL-CCNC: 78 U/L (ref 39–117)
ALT SERPL W P-5'-P-CCNC: 32 U/L (ref 1–41)
ANION GAP SERPL CALCULATED.3IONS-SCNC: 13 MMOL/L (ref 5–15)
AST SERPL-CCNC: 26 U/L (ref 1–40)
BILIRUB SERPL-MCNC: 0.5 MG/DL (ref 0–1.2)
BUN BLDA-MCNC: 21 MG/DL (ref 8–26)
BUN SERPL-MCNC: 20 MG/DL (ref 8–23)
BUN/CREAT SERPL: 16.9 (ref 7–25)
CA-I BLDA-SCNC: 1.28 MMOL/L (ref 1.2–1.32)
CALCIUM SPEC-SCNC: 9.3 MG/DL (ref 8.6–10.5)
CHLORIDE BLDA-SCNC: 101 MMOL/L (ref 98–109)
CHLORIDE SERPL-SCNC: 101 MMOL/L (ref 98–107)
CHOLEST SERPL-MCNC: 146 MG/DL (ref 0–200)
CO2 BLDA-SCNC: 22 MMOL/L (ref 24–29)
CO2 SERPL-SCNC: 23 MMOL/L (ref 22–29)
CREAT BLDA-MCNC: 1.1 MG/DL (ref 0.6–1.3)
CREAT SERPL-MCNC: 1.18 MG/DL (ref 0.76–1.27)
DEPRECATED RDW RBC AUTO: 38.7 FL (ref 37–54)
EGFRCR SERPLBLD CKD-EPI 2021: 68.5 ML/MIN/1.73
EGFRCR SERPLBLD CKD-EPI 2021: 74.5 ML/MIN/1.73
ERYTHROCYTE [DISTWIDTH] IN BLOOD BY AUTOMATED COUNT: 12.1 % (ref 12.3–15.4)
GLOBULIN UR ELPH-MCNC: 2.5 GM/DL
GLUCOSE BLDC GLUCOMTR-MCNC: 189 MG/DL (ref 70–130)
GLUCOSE SERPL-MCNC: 197 MG/DL (ref 65–99)
HCT VFR BLD AUTO: 42.1 % (ref 37.5–51)
HCT VFR BLDA CALC: 44 % (ref 38–51)
HDLC SERPL-MCNC: 31 MG/DL (ref 40–60)
HGB BLD-MCNC: 14 G/DL (ref 13–17.7)
HGB BLDA-MCNC: 15 G/DL (ref 12–17)
LDLC SERPL CALC-MCNC: 72 MG/DL (ref 0–100)
LDLC/HDLC SERPL: 2.02 {RATIO}
MAGNESIUM SERPL-MCNC: 1.9 MG/DL (ref 1.6–2.4)
MCH RBC QN AUTO: 29 PG (ref 26.6–33)
MCHC RBC AUTO-ENTMCNC: 33.3 G/DL (ref 31.5–35.7)
MCV RBC AUTO: 87.3 FL (ref 79–97)
PLATELET # BLD AUTO: 172 10*3/MM3 (ref 140–450)
PMV BLD AUTO: 10.4 FL (ref 6–12)
POTASSIUM BLDA-SCNC: 4.7 MMOL/L (ref 3.5–4.9)
POTASSIUM SERPL-SCNC: 4.8 MMOL/L (ref 3.5–5.2)
PROT SERPL-MCNC: 6.8 G/DL (ref 6–8.5)
RBC # BLD AUTO: 4.82 10*6/MM3 (ref 4.14–5.8)
SODIUM BLD-SCNC: 137 MMOL/L (ref 138–146)
SODIUM SERPL-SCNC: 137 MMOL/L (ref 136–145)
TRIGL SERPL-MCNC: 262 MG/DL (ref 0–150)
VLDLC SERPL-MCNC: 43 MG/DL (ref 5–40)
WBC NRBC COR # BLD: 4.73 10*3/MM3 (ref 3.4–10.8)

## 2023-07-28 PROCEDURE — C1761: HCPCS | Performed by: INTERNAL MEDICINE

## 2023-07-28 PROCEDURE — 93458 L HRT ARTERY/VENTRICLE ANGIO: CPT | Performed by: INTERNAL MEDICINE

## 2023-07-28 PROCEDURE — 25010000002 PHENYLEPHRINE 10 MG/ML SOLUTION: Performed by: INTERNAL MEDICINE

## 2023-07-28 PROCEDURE — 85347 COAGULATION TIME ACTIVATED: CPT

## 2023-07-28 PROCEDURE — 83735 ASSAY OF MAGNESIUM: CPT | Performed by: PHYSICIAN ASSISTANT

## 2023-07-28 PROCEDURE — 92978 ENDOLUMINL IVUS OCT C 1ST: CPT | Performed by: INTERNAL MEDICINE

## 2023-07-28 PROCEDURE — 25010000002 MIDAZOLAM PER 1 MG: Performed by: INTERNAL MEDICINE

## 2023-07-28 PROCEDURE — C1725 CATH, TRANSLUMIN NON-LASER: HCPCS | Performed by: INTERNAL MEDICINE

## 2023-07-28 PROCEDURE — C1753 CATH, INTRAVAS ULTRASOUND: HCPCS | Performed by: INTERNAL MEDICINE

## 2023-07-28 PROCEDURE — 85027 COMPLETE CBC AUTOMATED: CPT | Performed by: PHYSICIAN ASSISTANT

## 2023-07-28 PROCEDURE — C1874 STENT, COATED/COV W/DEL SYS: HCPCS | Performed by: INTERNAL MEDICINE

## 2023-07-28 PROCEDURE — C1769 GUIDE WIRE: HCPCS | Performed by: INTERNAL MEDICINE

## 2023-07-28 PROCEDURE — 80047 BASIC METABLC PNL IONIZED CA: CPT

## 2023-07-28 PROCEDURE — C9600 PERC DRUG-EL COR STENT SING: HCPCS | Performed by: INTERNAL MEDICINE

## 2023-07-28 PROCEDURE — C1887 CATHETER, GUIDING: HCPCS | Performed by: INTERNAL MEDICINE

## 2023-07-28 PROCEDURE — 80053 COMPREHEN METABOLIC PANEL: CPT | Performed by: PHYSICIAN ASSISTANT

## 2023-07-28 PROCEDURE — 92928 PRQ TCAT PLMT NTRAC ST 1 LES: CPT | Performed by: INTERNAL MEDICINE

## 2023-07-28 PROCEDURE — 0715T: CPT | Performed by: INTERNAL MEDICINE

## 2023-07-28 PROCEDURE — 93005 ELECTROCARDIOGRAM TRACING: CPT | Performed by: INTERNAL MEDICINE

## 2023-07-28 PROCEDURE — 25010000002 NITROGLYCERIN 100-5 MCG/ML-% SOLUTION: Performed by: INTERNAL MEDICINE

## 2023-07-28 PROCEDURE — 25510000001 IOPAMIDOL PER 1 ML: Performed by: INTERNAL MEDICINE

## 2023-07-28 PROCEDURE — 25010000002 HEPARIN (PORCINE) PER 1000 UNITS: Performed by: INTERNAL MEDICINE

## 2023-07-28 PROCEDURE — 80061 LIPID PANEL: CPT | Performed by: PHYSICIAN ASSISTANT

## 2023-07-28 PROCEDURE — C1894 INTRO/SHEATH, NON-LASER: HCPCS | Performed by: INTERNAL MEDICINE

## 2023-07-28 PROCEDURE — 25010000002 FENTANYL CITRATE (PF) 50 MCG/ML SOLUTION: Performed by: INTERNAL MEDICINE

## 2023-07-28 PROCEDURE — 85014 HEMATOCRIT: CPT

## 2023-07-28 PROCEDURE — 0715T PR PERCUTANEOUS TRANSLUMINAL CORONARY LITHOTRIPSY: CPT | Performed by: INTERNAL MEDICINE

## 2023-07-28 DEVICE — XIENCE SKYPOINT™ EVEROLIMUS ELUTING CORONARY STENT SYSTEM 3.00 MM X 33 MM / RAPID-EXCHANGE
Type: IMPLANTABLE DEVICE | Site: HEART | Status: FUNCTIONAL
Brand: XIENCE SKYPOINT™

## 2023-07-28 DEVICE — XIENCE SKYPOINT™ EVEROLIMUS ELUTING CORONARY STENT SYSTEM 3.00 MM X 18 MM / RAPID-EXCHANGE
Type: IMPLANTABLE DEVICE | Site: HEART | Status: FUNCTIONAL
Brand: XIENCE SKYPOINT™

## 2023-07-28 DEVICE — XIENCE SKYPOINT™ EVEROLIMUS ELUTING CORONARY STENT SYSTEM 4.00 MM X 28 MM / RAPID-EXCHANGE
Type: IMPLANTABLE DEVICE | Status: FUNCTIONAL
Brand: XIENCE SKYPOINT™

## 2023-07-28 DEVICE — XIENCE SKYPOINT™ EVEROLIMUS ELUTING CORONARY STENT SYSTEM 3.50 MM X 12 MM / RAPID-EXCHANGE
Type: IMPLANTABLE DEVICE | Site: HEART | Status: FUNCTIONAL
Brand: XIENCE SKYPOINT™

## 2023-07-28 RX ORDER — HEPARIN SODIUM 1000 [USP'U]/ML
INJECTION, SOLUTION INTRAVENOUS; SUBCUTANEOUS
Status: DISCONTINUED | OUTPATIENT
Start: 2023-07-28 | End: 2023-07-28 | Stop reason: HOSPADM

## 2023-07-28 RX ORDER — ASPIRIN 81 MG/1
81 TABLET ORAL DAILY
Qty: 30 TABLET | Refills: 0 | Status: SHIPPED | OUTPATIENT
Start: 2023-07-28 | End: 2023-08-27

## 2023-07-28 RX ORDER — SODIUM CHLORIDE 0.9 % (FLUSH) 0.9 %
10 SYRINGE (ML) INJECTION AS NEEDED
Status: DISCONTINUED | OUTPATIENT
Start: 2023-07-28 | End: 2023-07-28 | Stop reason: HOSPADM

## 2023-07-28 RX ORDER — LIDOCAINE HYDROCHLORIDE 10 MG/ML
INJECTION, SOLUTION EPIDURAL; INFILTRATION; INTRACAUDAL; PERINEURAL
Status: DISCONTINUED | OUTPATIENT
Start: 2023-07-28 | End: 2023-07-28 | Stop reason: HOSPADM

## 2023-07-28 RX ORDER — SODIUM CHLORIDE 9 MG/ML
1 INJECTION, SOLUTION INTRAVENOUS CONTINUOUS
Status: ACTIVE | OUTPATIENT
Start: 2023-07-28 | End: 2023-07-28

## 2023-07-28 RX ORDER — ASPIRIN 81 MG/1
324 TABLET, CHEWABLE ORAL ONCE
Status: COMPLETED | OUTPATIENT
Start: 2023-07-28 | End: 2023-07-28

## 2023-07-28 RX ORDER — ASPIRIN 81 MG/1
81 TABLET ORAL DAILY
Status: DISCONTINUED | OUTPATIENT
Start: 2023-07-29 | End: 2023-07-28 | Stop reason: HOSPADM

## 2023-07-28 RX ORDER — CLOPIDOGREL BISULFATE 75 MG/1
75 TABLET ORAL DAILY
Qty: 90 TABLET | Refills: 3 | Status: SHIPPED | OUTPATIENT
Start: 2023-07-28

## 2023-07-28 RX ORDER — SODIUM CHLORIDE 0.9 % (FLUSH) 0.9 %
10 SYRINGE (ML) INJECTION EVERY 12 HOURS SCHEDULED
Status: DISCONTINUED | OUTPATIENT
Start: 2023-07-28 | End: 2023-07-28 | Stop reason: HOSPADM

## 2023-07-28 RX ORDER — FENTANYL CITRATE 50 UG/ML
INJECTION, SOLUTION INTRAMUSCULAR; INTRAVENOUS
Status: DISCONTINUED | OUTPATIENT
Start: 2023-07-28 | End: 2023-07-28 | Stop reason: HOSPADM

## 2023-07-28 RX ORDER — CLOPIDOGREL BISULFATE 75 MG/1
TABLET ORAL
Status: DISCONTINUED | OUTPATIENT
Start: 2023-07-28 | End: 2023-07-28 | Stop reason: HOSPADM

## 2023-07-28 RX ORDER — SODIUM CHLORIDE 9 MG/ML
40 INJECTION, SOLUTION INTRAVENOUS AS NEEDED
Status: DISCONTINUED | OUTPATIENT
Start: 2023-07-28 | End: 2023-07-28 | Stop reason: HOSPADM

## 2023-07-28 RX ORDER — NITROGLYCERIN 0.4 MG/1
0.4 TABLET SUBLINGUAL
Status: DISCONTINUED | OUTPATIENT
Start: 2023-07-28 | End: 2023-07-28 | Stop reason: HOSPADM

## 2023-07-28 RX ORDER — MIDAZOLAM HYDROCHLORIDE 1 MG/ML
INJECTION INTRAMUSCULAR; INTRAVENOUS
Status: DISCONTINUED | OUTPATIENT
Start: 2023-07-28 | End: 2023-07-28 | Stop reason: HOSPADM

## 2023-07-28 RX ORDER — ACETAMINOPHEN 325 MG/1
650 TABLET ORAL EVERY 4 HOURS PRN
Status: DISCONTINUED | OUTPATIENT
Start: 2023-07-28 | End: 2023-07-28 | Stop reason: HOSPADM

## 2023-07-28 RX ORDER — NICARDIPINE HCL-0.9% SOD CHLOR 1 MG/10 ML
SYRINGE (ML) INTRAVENOUS
Status: DISCONTINUED | OUTPATIENT
Start: 2023-07-28 | End: 2023-07-28 | Stop reason: HOSPADM

## 2023-07-28 RX ORDER — PHENYLEPHRINE HYDROCHLORIDE 10 MG/ML
INJECTION INTRAVENOUS
Status: DISCONTINUED | OUTPATIENT
Start: 2023-07-28 | End: 2023-07-28 | Stop reason: HOSPADM

## 2023-07-28 RX ORDER — SODIUM CHLORIDE 9 MG/ML
3 INJECTION, SOLUTION INTRAVENOUS CONTINUOUS
Status: ACTIVE | OUTPATIENT
Start: 2023-07-28 | End: 2023-07-28

## 2023-07-28 RX ADMIN — ASPIRIN 324 MG: 81 TABLET, CHEWABLE ORAL at 09:16

## 2023-07-28 RX ADMIN — SODIUM CHLORIDE 3 ML/KG/HR: 9 INJECTION, SOLUTION INTRAVENOUS at 09:14

## 2023-07-28 NOTE — Clinical Note
First balloon inflation max pressure = 10 sam. First balloon inflation duration = 12 seconds. Second inflation of balloon - Max pressure = 14 sam. 2nd Inflation of balloon - Duration = 20 seconds. 2nd inflation was done at 11:18 EDT. The balloon is positioned in the Mid segment of the vessel. Third inflation of balloon - Max pressure = 15 sam. 3rd Inflation of balloon - Duration = 20 seconds. 3rd inflation was done at 11:19 EDT.

## 2023-07-28 NOTE — Clinical Note
First balloon inflation max pressure = 15 sam. First balloon inflation duration = 10 seconds. Second inflation of balloon - Max pressure = 15 sam. 2nd Inflation of balloon - Duration = 10 seconds. 2nd inflation was done at 11:35 EDT. The balloon is positioned in the Mid segment of the vessel. Third inflation of balloon - Max pressure = 18 sam. 3rd Inflation of balloon - Duration = 10 seconds. 3rd inflation was done at 11:36 EDT. The bal loon is positioned in the Mid segment of the vessel.

## 2023-07-28 NOTE — NURSING NOTE
Pt. Referred for Phase II Cardiac Rehab. Staff discussed benefits of exercise, program protocol, and educational material provided. Teach back verified.   Patient refuses services. Patient is given a brochure about the program and is educated that he has a year from today to take advantage of this referral.

## 2023-07-28 NOTE — Clinical Note
Hemostasis started on the right radial artery. R-Band was used in achieving hemostasis. Radial compression device applied to vessel. Hemostasis achieved successfully. Closure device additional comment: 15 cc air

## 2023-07-28 NOTE — Clinical Note
First balloon inflation max pressure = 16 sam. First balloon inflation duration = 10 seconds. Second inflation of balloon - Max pressure = 18 sam. 2nd Inflation of balloon - Duration = 15 seconds. 2nd inflation was done at 12:44 EDT. Third inflation of balloon - Max pressure = 18 sam. 3rd Inflation of balloon - Duration = 10 seconds. 3rd inflation was done at 12:45 EDT.

## 2023-07-28 NOTE — Clinical Note
First balloon inflation max pressure = 12 sam. First balloon inflation duration = 20 seconds. Second inflation of balloon - Max pressure = 15 sam. 2nd Inflation of balloon - Duration = 10 seconds.

## 2023-07-28 NOTE — Clinical Note
First balloon inflation max pressure = 12 sam. First balloon inflation duration = 20 seconds. Second inflation of balloon - Max pressure = 18 sam. 2nd Inflation of balloon - Duration = 10 seconds. Third inflation of balloon - Max pressure = 20 sam. 3rd Inflation of balloon - Duration = 10 seconds.

## 2023-07-28 NOTE — Clinical Note
First balloon inflation max pressure = 12 sam. First balloon inflation duration = 15 seconds. Second inflation of balloon - Max pressure = 12 sam. 2nd Inflation of balloon - Duration = 15 seconds. 2nd inflation was done at 12:04 EDT.

## 2023-07-28 NOTE — INTERVAL H&P NOTE
"  H&P reviewed. The patient was examined and there are no changes to the H&P.      Vitals and Labs today:  Vitals:    07/28/23 0841 07/28/23 0843   BP: 141/85 141/85   BP Location: Right arm Left arm   Patient Position: Lying Lying   Pulse: 62 60   SpO2: 94% 94%   Weight:  115 kg (253 lb 15.5 oz)   Height:  185.4 cm (73\")     Lab Results   Component Value Date    WBC 8.25 06/21/2023    HGB 14.7 06/21/2023    HCT 43.7 06/21/2023    MCV 85.9 06/21/2023     06/21/2023     Lab Results   Component Value Date    GLUCOSE 276 (H) 06/21/2023    BUN 13 06/21/2023    CREATININE 1.07 06/21/2023    EGFR 77.5 06/21/2023    BCR 12.1 06/21/2023    K 4.1 06/21/2023    CO2 17.0 (L) 06/21/2023    CALCIUM 9.4 06/21/2023    PROTENTOTREF 6.7 02/24/2021    ALBUMIN 4.5 06/21/2023    BILITOT 0.3 06/21/2023    AST 24 06/21/2023    ALT 28 06/21/2023     Lab Results   Component Value Date    HGBA1C 7.7 06/23/2023     Lab Results   Component Value Date    CHOL 111 09/15/2022    CHLPL 148 02/24/2021    TRIG 113 09/15/2022    HDL 32 (L) 09/15/2022    LDL 58 09/15/2022       The risks, benefits, and alternative options of cardiac catheterization were discussed with the patient.  The risks include death, MI, stroke, infection, vascular injury requiring surgical repair and/or blood transfusion, coronary dissection, renal dysfunction/failure, allergic reaction, emergent CABG.  If PCI is needed there is a 1-2% risk of emergent CABG.  The patient is agreeable for cardiac catheterization, possible PCI or CABG. Plan is to proceed with cardiac catheterization and possible PCI.     Rich Li MD, [facility], [facility]  Interventional Cardiology  07/28/23  08:51 EDT      "

## 2023-07-28 NOTE — Clinical Note
First balloon inflation max pressure = 12 sam. First balloon inflation duration = 15 seconds. Second inflation of balloon - Max pressure = 12 sam. 2nd Inflation of balloon - Duration = 15 seconds. 2nd inflation was done at 12:01 EDT.

## 2023-07-28 NOTE — Clinical Note
First balloon inflation max pressure = 12 sam. First balloon inflation duration = 10 seconds. Second inflation of balloon - Max pressure = 12 sam. 2nd Inflation of balloon - Duration = 10 seconds. 2nd inflation was done at 12:29 EDT.

## 2023-07-28 NOTE — Clinical Note
First balloon inflation max pressure = 12 sam. First balloon inflation duration = 15 seconds. Second inflation of balloon - Max pressure = 18 sam. 2nd Inflation of balloon - Duration = 10 seconds.

## 2023-07-28 NOTE — Clinical Note
First balloon inflation max pressure = 16 sam. First balloon inflation duration = 30 seconds. Second inflation of balloon - Max pressure = 15 sam. 2nd Inflation of balloon - Duration = 20 seconds. 2nd inflation was done at 11:21 EDT. The balloon is positioned in the Mid segment of the vessel.

## 2023-07-31 ENCOUNTER — CALL CENTER PROGRAMS (OUTPATIENT)
Dept: CALL CENTER | Facility: HOSPITAL | Age: 63
End: 2023-07-31
Payer: MEDICARE

## 2023-07-31 LAB
QT INTERVAL: 464 MS
QTC INTERVAL: 464 MS

## 2023-08-03 ENCOUNTER — OFFICE VISIT (OUTPATIENT)
Dept: FAMILY MEDICINE CLINIC | Facility: CLINIC | Age: 63
End: 2023-08-03
Payer: MEDICARE

## 2023-08-03 VITALS
HEIGHT: 73 IN | BODY MASS INDEX: 34.03 KG/M2 | WEIGHT: 256.8 LBS | TEMPERATURE: 98.7 F | SYSTOLIC BLOOD PRESSURE: 128 MMHG | DIASTOLIC BLOOD PRESSURE: 78 MMHG | OXYGEN SATURATION: 95 % | HEART RATE: 64 BPM

## 2023-08-03 DIAGNOSIS — Z76.0 MEDICATION REFILL: ICD-10-CM

## 2023-08-03 DIAGNOSIS — G89.29 CHRONIC PAIN OF LEFT KNEE: Chronic | ICD-10-CM

## 2023-08-03 DIAGNOSIS — I10 PRIMARY HYPERTENSION: ICD-10-CM

## 2023-08-03 DIAGNOSIS — M25.562 CHRONIC PAIN OF LEFT KNEE: Chronic | ICD-10-CM

## 2023-08-03 DIAGNOSIS — I25.10 CORONARY ARTERIOSCLEROSIS IN NATIVE ARTERY: Primary | ICD-10-CM

## 2023-08-03 DIAGNOSIS — F41.9 ANXIETY: ICD-10-CM

## 2023-08-03 PROCEDURE — 3074F SYST BP LT 130 MM HG: CPT | Performed by: NURSE PRACTITIONER

## 2023-08-03 PROCEDURE — 1159F MED LIST DOCD IN RCRD: CPT | Performed by: NURSE PRACTITIONER

## 2023-08-03 PROCEDURE — 3051F HG A1C>EQUAL 7.0%<8.0%: CPT | Performed by: NURSE PRACTITIONER

## 2023-08-03 PROCEDURE — 3078F DIAST BP <80 MM HG: CPT | Performed by: NURSE PRACTITIONER

## 2023-08-03 PROCEDURE — 1160F RVW MEDS BY RX/DR IN RCRD: CPT | Performed by: NURSE PRACTITIONER

## 2023-08-03 PROCEDURE — 99214 OFFICE O/P EST MOD 30 MIN: CPT | Performed by: NURSE PRACTITIONER

## 2023-08-03 RX ORDER — CARVEDILOL 25 MG/1
25 TABLET ORAL 2 TIMES DAILY WITH MEALS
Qty: 60 TABLET | Refills: 2 | Status: SHIPPED | OUTPATIENT
Start: 2023-08-03

## 2023-08-03 RX ORDER — SERTRALINE HYDROCHLORIDE 25 MG/1
25 TABLET, FILM COATED ORAL DAILY
Qty: 30 TABLET | Refills: 1 | Status: SHIPPED | OUTPATIENT
Start: 2023-08-03

## 2023-08-12 PROCEDURE — 93295 DEV INTERROG REMOTE 1/2/MLT: CPT | Performed by: STUDENT IN AN ORGANIZED HEALTH CARE EDUCATION/TRAINING PROGRAM

## 2023-08-12 PROCEDURE — 93296 REM INTERROG EVL PM/IDS: CPT | Performed by: STUDENT IN AN ORGANIZED HEALTH CARE EDUCATION/TRAINING PROGRAM

## 2023-08-15 ENCOUNTER — TRANSCRIBE ORDERS (OUTPATIENT)
Dept: CARDIAC REHAB | Facility: HOSPITAL | Age: 63
End: 2023-08-15
Payer: MEDICARE

## 2023-08-15 DIAGNOSIS — E11.9 TYPE 2 DIABETES MELLITUS TREATED WITHOUT INSULIN: ICD-10-CM

## 2023-08-15 DIAGNOSIS — Z76.0 MEDICATION REFILL: ICD-10-CM

## 2023-08-15 DIAGNOSIS — Z95.1 S/P CABG (CORONARY ARTERY BYPASS GRAFT): Primary | ICD-10-CM

## 2023-08-15 RX ORDER — GLIMEPIRIDE 4 MG/1
4 TABLET ORAL 2 TIMES DAILY
Qty: 180 TABLET | Refills: 0 | Status: SHIPPED | OUTPATIENT
Start: 2023-08-15

## 2023-08-22 DIAGNOSIS — Z95.5 STENTED CORONARY ARTERY: Primary | ICD-10-CM

## 2023-08-24 ENCOUNTER — TREATMENT (OUTPATIENT)
Dept: CARDIAC REHAB | Facility: HOSPITAL | Age: 63
End: 2023-08-24
Payer: MEDICARE

## 2023-08-24 DIAGNOSIS — Z95.5 STENTED CORONARY ARTERY: ICD-10-CM

## 2023-08-24 PROCEDURE — 93798 PHYS/QHP OP CAR RHAB W/ECG: CPT

## 2023-08-24 NOTE — PROGRESS NOTES
Attended Phase II Cardiac Rehab. Medication and health history reconciled. See [facility] for details.

## 2023-08-31 ENCOUNTER — TREATMENT (OUTPATIENT)
Dept: CARDIAC REHAB | Facility: HOSPITAL | Age: 63
End: 2023-08-31
Payer: MEDICARE

## 2023-08-31 DIAGNOSIS — Z95.5 STENTED CORONARY ARTERY: Primary | ICD-10-CM

## 2023-08-31 PROCEDURE — 93798 PHYS/QHP OP CAR RHAB W/ECG: CPT

## 2023-08-31 PROCEDURE — 93797 PHYS/QHP OP CAR RHAB WO ECG: CPT

## 2023-09-06 ENCOUNTER — TREATMENT (OUTPATIENT)
Dept: CARDIAC REHAB | Facility: HOSPITAL | Age: 63
End: 2023-09-06
Payer: MEDICARE

## 2023-09-06 DIAGNOSIS — Z95.5 STENTED CORONARY ARTERY: Primary | ICD-10-CM

## 2023-09-06 PROCEDURE — 93798 PHYS/QHP OP CAR RHAB W/ECG: CPT

## 2023-09-06 RX ORDER — VALSARTAN 320 MG/1
320 TABLET ORAL DAILY
Status: CANCELLED | OUTPATIENT
Start: 2023-09-06

## 2023-09-06 RX ORDER — APIXABAN 5 MG/1
TABLET, FILM COATED ORAL
Qty: 60 TABLET | Refills: 0 | Status: SHIPPED | OUTPATIENT
Start: 2023-09-06

## 2023-09-07 RX ORDER — VALSARTAN 320 MG/1
320 TABLET ORAL DAILY
Qty: 90 TABLET | Refills: 0 | Status: SHIPPED | OUTPATIENT
Start: 2023-09-07

## 2023-09-08 ENCOUNTER — TREATMENT (OUTPATIENT)
Dept: CARDIAC REHAB | Facility: HOSPITAL | Age: 63
End: 2023-09-08
Payer: MEDICARE

## 2023-09-08 DIAGNOSIS — Z95.5 STENTED CORONARY ARTERY: Primary | ICD-10-CM

## 2023-09-08 PROCEDURE — 93798 PHYS/QHP OP CAR RHAB W/ECG: CPT

## 2023-09-11 ENCOUNTER — TREATMENT (OUTPATIENT)
Dept: CARDIAC REHAB | Facility: HOSPITAL | Age: 63
End: 2023-09-11
Payer: MEDICARE

## 2023-09-11 DIAGNOSIS — Z95.5 STENTED CORONARY ARTERY: Primary | ICD-10-CM

## 2023-09-11 PROCEDURE — 93798 PHYS/QHP OP CAR RHAB W/ECG: CPT

## 2023-09-13 ENCOUNTER — TREATMENT (OUTPATIENT)
Dept: CARDIAC REHAB | Facility: HOSPITAL | Age: 63
End: 2023-09-13
Payer: MEDICARE

## 2023-09-13 DIAGNOSIS — Z95.5 STENTED CORONARY ARTERY: Primary | ICD-10-CM

## 2023-09-13 PROCEDURE — 93797 PHYS/QHP OP CAR RHAB WO ECG: CPT

## 2023-09-13 PROCEDURE — 93798 PHYS/QHP OP CAR RHAB W/ECG: CPT

## 2023-09-13 NOTE — PROGRESS NOTES
"  NUTRITION ASSESSMENT & EDUCATION  CARDIAC/PULMONARY REHAB      Appointment Date and Time: 09/13/23, 08:53 EDT  Patient Name: Omar Can   Age: 63 y.o.     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                         Job Activity Level: N/A  Routine Exercise: mild-moderate  Prior to stenting - was on treadmill 1 mi, bicycle 2 mi, abd crunches, plans to get back to this after rehab  Socially- he is a district commissioner of the [employer] and visits several  meetings monthly  Weight Assessment:   Height:   Ht Readings from Last 1 Encounters:   08/03/23 185.4 cm (72.99\")     Weight:   Wt Readings from Last 1 Encounters:   08/03/23 116 kg (256 lb 12.8 oz)         BMI:    BMI Readings from Last 1 Encounters:   08/03/23 33.89 kg/m²       -------------------------------------------------------------------------------------------------  IBW: Patient weight not recorded  -------------------------------------------------------------------------------------------------  Weight Assessment: Obese  Weight Change last six months: intentional loss of 40 lb- change in diet/exercise       Usual Weight: 300+ lb       Desired Weight: 230 lb    Cardiac Risk Factors:         Atherosclerotic heart disease       Stents       Hyperlipidemia/hypercholesterolemia       Hypertension       Diabetes mellitus/Pre-diabetes       Obesity       ICD implant      Chronic Kidney disease    Pertinent Lab Values:     Total Cholesterol   Date Value Ref Range Status   07/28/2023 146 0 - 200 mg/dL Final     HDL Cholesterol   Date Value Ref Range Status   07/28/2023 31 (L) 40 - 60 mg/dL Final     LDL Cholesterol    Date Value Ref Range Status   07/28/2023 72 0 - 100 mg/dL Final     LDL Chol Calc (NIH)   Date Value Ref Range Status   02/24/2021 86 0 - 100 mg/dL Final     LDL/HDL Ratio   Date Value Ref Range Status   07/28/2023 2.02  " Final     Triglycerides   Date Value Ref Range Status   07/28/2023 262 (H) 0 - 150 mg/dL Final     Hemoglobin A1C   Date Value Ref Range Status   06/23/2023 7.7 % Final   12/14/2022 8.00 (H) 4.80 - 5.60 % Final     TSH   Date Value Ref Range Status   02/24/2021 1.160 0.270 - 4.200 uIU/mL Final   06/27/2020 1.320 0.270 - 4.200 uIU/mL Final     Glucose   Date Value Ref Range Status   07/28/2023 197 (H) 65 - 99 mg/dL Final     Labs reviewed with Pt today- noted improvement in A1c w/lifestyle changes    Pertinent Medications:   Nutritional Supplements: reviewed  Pertinent Nutrition-Related Medications: Reviewed, no recommended changes  Self Identified Problems or Concerns:   Has made many changes in diet recently. He is the primary cook at home.  Recalls info from [facility] outpt diabetes program and that impressed enough to make several changes.  He is putting effort into living a healthier lifestyle and manage health    Nutrition Influences:   Appetite: good            Taste/smell changes:  No  Factors limiting PO intake: none    Food records reviewed?: Yes, completed review of 'Rate Your Plate' score and tallies.  Extensive review and discussion of home diet today.    Omar lives with: wife, Maryjane Nelson receives lifestyle support from others at home?: Yes  Spouse/significant other present for diet instruction today?: No    Diet Assessment:   Diet Survey Score: 53 of possible 72 = 74 % compliant with AHA guidelines    Meal intake pattern:  3 meals/day  Dining out:  Yes               Fast food:  Yes  Eating out a couple of times weekly    24 hour recall:   B cheerios/almond milk, water, 2 cups black coffee   L bologna on whole wheat, chips, grapes, water   D 1/2 portion Mar[employer]za[employer] jambalaya, bread, water     Who does the grocery shopping:  both  Who does the cooking at home:  Omar                     Home diet review:  Generally Heart Healthy Fat intake, has made changes toward American Heart Association guidelines,  Moderate-acceptable Sodium intake, aware of sodium guidelines and strives to reduce intake, Strives for a Heart Healthy Diet, and Has recently made positive changes toward AHA nutrition guidelines    Diet contains limited saturated fats/reviewed these areas today; good daily intake of fruits/vegetables, using whole wheat/whole grain breads, crackers, rice/pasta often.  Using olive oil, Elyssa spray. Some use of butter/margarine - likes to cook and bake.   Limited intake of beans, nuts, PB currently. Rare sweets, drinking lots of water - no sweetened beverages.    Motivation level toward diet compliance:  strong  Assessment / Recommendations:   Prior diet education before to coming to Cardiac Rehab?: No  Instructed provided on:  American Heart Association 2021 Nutrition Guidelines, Saturated Fat, Picking Healthy Proteins, Get the Scoop on Sodium/Salt <2300 mg/d, Added Sugars Guidance, Go Nuts for Heart Health, [place] 3 fats in Fish & Seafood, Dining Out Doesn't Mean Ditch Your Diet, and Foods to Avoid on the Cardiac Diet  Written materials provided to patient: Yes    Goals/Plan:   Patient defined goals:   1) continue with current changes he has made, review materials from today making further changes     Plan:  Encouraged to complete goals and continue setting new nutrition/exercise goals             Encouraged to follow up with dietitian during cardiac rehab sessions; may request additional RD counseling.    Liz Parra RD, 08:53 EDT - 9/13/2023

## 2023-09-14 RX ORDER — HYDRALAZINE HYDROCHLORIDE 50 MG/1
50 TABLET, FILM COATED ORAL EVERY 8 HOURS SCHEDULED
Qty: 90 TABLET | Refills: 11 | Status: SHIPPED | OUTPATIENT
Start: 2023-09-14

## 2023-09-15 ENCOUNTER — TREATMENT (OUTPATIENT)
Dept: CARDIAC REHAB | Facility: HOSPITAL | Age: 63
End: 2023-09-15
Payer: MEDICARE

## 2023-09-15 DIAGNOSIS — Z95.5 STENTED CORONARY ARTERY: Primary | ICD-10-CM

## 2023-09-15 PROCEDURE — 93798 PHYS/QHP OP CAR RHAB W/ECG: CPT

## 2023-09-18 ENCOUNTER — TREATMENT (OUTPATIENT)
Dept: CARDIAC REHAB | Facility: HOSPITAL | Age: 63
End: 2023-09-18
Payer: MEDICARE

## 2023-09-18 DIAGNOSIS — Z95.5 STENTED CORONARY ARTERY: Primary | ICD-10-CM

## 2023-09-18 PROCEDURE — 93798 PHYS/QHP OP CAR RHAB W/ECG: CPT

## 2023-09-20 ENCOUNTER — TREATMENT (OUTPATIENT)
Dept: CARDIAC REHAB | Facility: HOSPITAL | Age: 63
End: 2023-09-20
Payer: MEDICARE

## 2023-09-20 DIAGNOSIS — Z95.5 STENTED CORONARY ARTERY: Primary | ICD-10-CM

## 2023-09-20 PROCEDURE — 93798 PHYS/QHP OP CAR RHAB W/ECG: CPT

## 2023-09-21 ENCOUNTER — LAB (OUTPATIENT)
Dept: LAB | Facility: HOSPITAL | Age: 63
End: 2023-09-21

## 2023-09-21 ENCOUNTER — OFFICE VISIT (OUTPATIENT)
Dept: FAMILY MEDICINE CLINIC | Facility: CLINIC | Age: 63
End: 2023-09-21
Payer: MEDICARE

## 2023-09-21 VITALS
HEART RATE: 65 BPM | WEIGHT: 260 LBS | DIASTOLIC BLOOD PRESSURE: 82 MMHG | TEMPERATURE: 98.9 F | SYSTOLIC BLOOD PRESSURE: 132 MMHG | OXYGEN SATURATION: 95 % | BODY MASS INDEX: 34.46 KG/M2 | HEIGHT: 73 IN

## 2023-09-21 DIAGNOSIS — Z00.00 MEDICARE ANNUAL WELLNESS VISIT, SUBSEQUENT: Primary | ICD-10-CM

## 2023-09-21 DIAGNOSIS — I10 PRIMARY HYPERTENSION: Chronic | ICD-10-CM

## 2023-09-21 DIAGNOSIS — E11.9 TYPE 2 DIABETES MELLITUS TREATED WITHOUT INSULIN: ICD-10-CM

## 2023-09-21 DIAGNOSIS — Z12.5 SCREENING FOR MALIGNANT NEOPLASM OF PROSTATE: ICD-10-CM

## 2023-09-21 LAB
ALBUMIN/CREATININE RATIO, URINE: NORMAL
EXPIRATION DATE: NORMAL
EXPIRATION DATE: NORMAL
HBA1C MFR BLD: 7.6 %
Lab: NORMAL
Lab: NORMAL
POC CREATININE URINE: 50
POC MICROALBUMIN URINE: 30

## 2023-09-21 PROCEDURE — G0103 PSA SCREENING: HCPCS

## 2023-09-21 NOTE — PROGRESS NOTES
The ABCs of the Annual Wellness Visit  Subsequent Medicare Wellness Visit    Subjective    Omar Can is a 63 y.o. male who presents for a Subsequent Medicare Wellness Visit and annual physical exam.    The following portions of the patient's history were reviewed and   updated as appropriate: allergies, current medications, past family history, past medical history, past social history, past surgical history, and problem list.    Compared to one year ago, the patient feels his physical   health is better.    Compared to one year ago, the patient feels his mental   health is the same.    Recent Hospitalizations:  He was admitted within the past 365 days at [facility].       Current Medical Providers:  Patient Care Team:  Diana Pardo APRN as PCP - General (Family Medicine)  Todd Ponce MD as Consulting Physician (Urology)  Margarito Damon MD as Consulting Physician (Cardiology)  Brayan Epps MD as Consulting Physician (Cardiology)  Charan Durham APRN as Nurse Practitioner (Pulmonary Disease)  Eder Magana PA as Physician Assistant (Cardiology)    Outpatient Medications Prior to Visit   Medication Sig Dispense Refill    amLODIPine (NORVASC) 10 MG tablet Take 1 tablet by mouth Daily.      atorvastatin (LIPITOR) 40 MG tablet Take 1 tablet by mouth every night at bedtime. 90 tablet 0    carvedilol (COREG) 25 MG tablet Take 1 tablet by mouth 2 (Two) Times a Day With Meals. 60 tablet 2    clopidogrel (PLAVIX) 75 MG tablet Take 1 tablet by mouth Daily. 90 tablet 3    coenzyme Q10 100 MG capsule Take 1 capsule by mouth Daily.      Eliquis 5 MG tablet tablet TAKE 1 TABLET BY MOUTH EVERY TWELVE HOURS 60 tablet 0    furosemide (LASIX) 20 MG tablet Take 1 tablet by mouth As Needed.      glimepiride (AMARYL) 4 MG tablet Take 1 tablet by mouth 2 (Two) Times a Day. 180 tablet 0    glucose blood (OneTouch Verio) test strip USE TO CHECK BLOOD GLUCOSE ONCE DAILY OR AS DIRECTED 100 each 0     glucose monitor monitoring kit 1 each As Needed (for diabetes). 1 each 0    hydrOXYzine (ATARAX) 25 MG tablet Take 1 tablet by mouth At Night As Needed (insomnia). 30 tablet 2    Lancets (onetouch ultrasoft) lancets Check daily and prn 100 each 12    metFORMIN (GLUCOPHAGE) 850 MG tablet TAKE 1/2 TABLET BY MOUTH 2 TIMES A DAY 90 tablet 0    Multiple Vitamin (MULTI-VITAMIN DAILY PO) Take 1 tablet by mouth Daily.      nitroglycerin (NITROSTAT) 0.3 MG SL tablet Place 1 tablet under the tongue Every 5 (Five) Minutes As Needed for Chest Pain. Take no more than 3 doses in 15 minutes. 30 tablet 1    ondansetron ODT (ZOFRAN-ODT) 4 MG disintegrating tablet Place 1 tablet on the tongue Every 8 (Eight) Hours As Needed.      Potassium Gluconate 2 MEQ tablet Take 2 mEq by mouth Daily.      Semaglutide,0.25 or 0.5MG/DOS, (Ozempic, 0.25 or 0.5 MG/DOSE,) 2 MG/3ML solution pen-injector Inject 0.25 mg under the skin into the appropriate area as directed 1 (One) Time Per Week. 33 mL 0    sertraline (Zoloft) 25 MG tablet Take 1 tablet by mouth Daily. 30 tablet 1    spironolactone (ALDACTONE) 25 MG tablet Take 1 tablet by mouth Daily. 30 tablet 11    tamsulosin (FLOMAX) 0.4 MG capsule 24 hr capsule Take 1 capsule by mouth Daily. 90 capsule 2    valsartan (DIOVAN) 320 MG tablet Take 1 tablet by mouth Daily. 90 tablet 0    hydrALAZINE (APRESOLINE) 50 MG tablet Take 1 tablet by mouth Every 8 (Eight) Hours. 90 tablet 11     No facility-administered medications prior to visit.       No opioid medication identified on active medication list. I have reviewed chart for other potential  high risk medication/s and harmful drug interactions in the elderly.        Aspirin is not on active medication list.  Aspirin use is contraindicated for this patient due to: current use of Eliquis.  .    Patient Active Problem List   Diagnosis    Cholestanol storage disease    Hyperlipidemia    Hypertensive disorder    Obstructive sleep apnea syndrome    Coronary  "arteriosclerosis in native artery    Atrioventricular block, first degree    Hydronephrosis    Biventricular implantable cardioverter-defibrillator (ICD) in situ    Stage 3 chronic kidney disease    Type 2 diabetes mellitus with hyperglycemia, without long-term current use of insulin    Prostate hypertrophy    Morbidly obese    Chronic pain of left knee    Primary hypertension    Insomnia    Paroxysmal atrial fibrillation    ICD (implantable cardioverter-defibrillator) lead failure    Pain associated with prosthesis of knee joint    Chest pain    VT (ventricular tachycardia)    Old myocardial infarction    Anxiety     Advance Care Planning   Advance Care Planning     Advance Directive is not on file.  ACP discussion was held with the patient during this visit. Patient does not have an advance directive, information provided.     Objective    Vitals:    23 1413   BP: 132/82   Pulse: 65   Temp: 98.9 °F (37.2 °C)   TempSrc: Infrared   SpO2: 95%   Weight: 118 kg (260 lb)   Height: 185.4 cm (72.99\")   PainSc: 0-No pain     Estimated body mass index is 34.31 kg/m² as calculated from the following:    Height as of this encounter: 185.4 cm (72.99\").    Weight as of this encounter: 118 kg (260 lb).           Does the patient have evidence of cognitive impairment? No         HEALTH RISK ASSESSMENT    Smoking Status:  Social History     Tobacco Use   Smoking Status Former    Packs/day: 1.00    Years: 25.00    Pack years: 25.00    Types: Cigarettes, Pipe    Quit date: 1996    Years since quittin.7   Smokeless Tobacco Never   Tobacco Comments    quit 25 years ago     Alcohol Consumption:  Social History     Substance and Sexual Activity   Alcohol Use Not Currently     Fall Risk Screen:    STEADI Fall Risk Assessment was completed, and patient is at HIGH risk for falls. Assessment completed on:2023    Depression Screenin/21/2023     2:10 PM   PHQ-2/PHQ-9 Depression Screening   Little Interest or " Pleasure in Doing Things 0-->not at all   Feeling Down, Depressed or Hopeless 0-->not at all   PHQ-9: Brief Depression Severity Measure Score 0       Health Habits and Functional and Cognitive Screenin/21/2023     2:11 PM   Functional & Cognitive Status   Do you have difficulty preparing food and eating? No   Do you have difficulty bathing yourself, getting dressed or grooming yourself? No   Do you have difficulty using the toilet? No   Do you have difficulty moving around from place to place? No   Do you have trouble with steps or getting out of a bed or a chair? No   Current Diet Low Carb Diet   Dental Exam Up to date   Eye Exam Up to date   Exercise (times per week) 3 times per week   Current Exercises Include Cardiovascular Workout;Walking   Do you need help using the phone?  No   Are you deaf or do you have serious difficulty hearing?  No   Do you need help to go to places out of walking distance? No   Do you need help shopping? No   Do you need help preparing meals?  No   Do you need help with housework?  No   Do you need help with laundry? No   Do you need help taking your medications? No   Do you need help managing money? No   Do you ever drive or ride in a car without wearing a seat belt? No   Have you felt unusual stress, anger or loneliness in the last month? No   Who do you live with? Spouse   If you need help, do you have trouble finding someone available to you? No   Have you been bothered in the last four weeks by sexual problems? No   Do you have difficulty concentrating, remembering or making decisions? No       Age-appropriate Screening Schedule:  Refer to the list below for future screening recommendations based on patient's age, sex and/or medical conditions. Orders for these recommended tests are listed in the plan section. The patient has been provided with a written plan.    Health Maintenance   Topic Date Due    DIABETIC FOOT EXAM  2022    AAA SCREEN (ONE-TIME)  Never done     Pneumococcal Vaccine 65+ (1 - PCV) 10/07/2023 (Originally 6/28/1966)    COVID-19 Vaccine (5 - Pfizer series) 10/31/2023 (Originally 6/28/2023)    ZOSTER VACCINE (1 of 2) 05/03/2024 (Originally 6/28/2008)    INFLUENZA VACCINE  10/01/2023    BMI FOLLOWUP  02/09/2024    HEMOGLOBIN A1C  03/21/2024    DIABETIC EYE EXAM  07/17/2024    LIPID PANEL  07/28/2024    ANNUAL WELLNESS VISIT  09/21/2024    URINE MICROALBUMIN  09/21/2024    COLORECTAL CANCER SCREENING  09/08/2031    TDAP/TD VACCINES (2 - Td or Tdap) 02/24/2032    HEPATITIS C SCREENING  Completed                  CMS Preventative Services Quick Reference  Risk Factors Identified During Encounter  Immunizations Discussed/Encouraged: Influenza, Prevnar 20 (Pneumococcal 20-valent conjugate), Shingrix, and COVID19  The above risks/problems have been discussed with the patient.  Pertinent information has been shared with the patient in the After Visit Summary.  An After Visit Summary and PPPS were made available to the patient.    Follow Up:   Next Medicare Wellness visit to be scheduled in 1 year.       Additional E&M Note during same encounter follows:  Patient has multiple medical problems which are significant and separately identifiable that require additional work above and beyond the Medicare Wellness Visit.      Chief Complaint  Medicare Wellness-subsequent    Subjective        HPI  Omar Can is also being seen today for annual physical exam. Patient reports that he is feeling well and has completed week 3 of 13 weeks of cardiac rehab. Patient states that he has only been taking his hydralazine BID rather than TID as prescribed by cardiology.    Patient presents for re-evaluation/management chronic DM2. Patient's last A1C was 7.7.    Review of Systems   Constitutional: Negative.    Respiratory: Negative.     Cardiovascular: Negative.    Endocrine: Negative for polydipsia, polyphagia and polyuria.     Objective   Vital Signs:  /82   Pulse 65   Temp  "98.9 °F (37.2 °C) (Infrared)   Ht 185.4 cm (72.99\")   Wt 118 kg (260 lb)   SpO2 95%   BMI 34.31 kg/m²     Physical Exam  Vitals and nursing note reviewed.   Constitutional:       General: He is not in acute distress.     Appearance: Normal appearance. He is well-developed. He is not ill-appearing, toxic-appearing or diaphoretic.   HENT:      Head: Normocephalic and atraumatic.   Cardiovascular:      Rate and Rhythm: Normal rate and regular rhythm.      Pulses:           Dorsalis pedis pulses are 2+ on the right side and 2+ on the left side.        Posterior tibial pulses are 1+ on the right side and 1+ on the left side.   Pulmonary:      Effort: Pulmonary effort is normal. No respiratory distress.      Breath sounds: Normal breath sounds. No stridor. No wheezing.   Musculoskeletal:      Right foot: Normal range of motion.      Left foot: Normal range of motion.   Feet:      Right foot:      Protective Sensation: 9 sites tested.  9 sites sensed.      Skin integrity: Skin integrity normal.      Left foot:      Protective Sensation: 9 sites tested.  9 sites sensed.      Skin integrity: Skin integrity normal.      Comments: Diabetic foot exam performed with monofilament testing with normal findings.  Skin:     General: Skin is warm and dry.   Neurological:      General: No focal deficit present.      Mental Status: He is alert and oriented to person, place, and time.   Psychiatric:         Mood and Affect: Mood normal.         Behavior: Behavior normal. Behavior is cooperative.         Thought Content: Thought content normal.         Judgment: Judgment normal.                       Assessment and Plan   Diagnoses and all orders for this visit:    1. Medicare annual wellness visit, subsequent (Primary)    2. Type 2 diabetes mellitus treated without insulin  -     POC Glycosylated Hemoglobin (Hb A1C)  -     POC Microalbumin    3. Primary hypertension  -     hydrALAZINE (APRESOLINE) 50 MG tablet; Take 1 tablet by mouth " 2 (Two) Times a Day.  Dispense: 60 tablet; Refill: 11    4. Screening for malignant neoplasm of prostate  -     PSA Screen; Future             Follow Up   No follow-ups on file.  Patient was given instructions and counseling regarding his condition or for health maintenance advice. Please see specific information pulled into the AVS if appropriate.

## 2023-09-22 ENCOUNTER — TREATMENT (OUTPATIENT)
Dept: CARDIAC REHAB | Facility: HOSPITAL | Age: 63
End: 2023-09-22
Payer: MEDICARE

## 2023-09-22 DIAGNOSIS — Z95.5 STENTED CORONARY ARTERY: Primary | ICD-10-CM

## 2023-09-22 LAB — PSA SERPL-MCNC: 1.78 NG/ML (ref 0–4)

## 2023-09-22 PROCEDURE — 93798 PHYS/QHP OP CAR RHAB W/ECG: CPT

## 2023-09-24 PROBLEM — U07.1 COVID-19: Status: RESOLVED | Noted: 2023-02-09 | Resolved: 2023-09-24

## 2023-09-24 RX ORDER — HYDRALAZINE HYDROCHLORIDE 50 MG/1
50 TABLET, FILM COATED ORAL 2 TIMES DAILY
Qty: 60 TABLET | Refills: 11
Start: 2023-09-24

## 2023-09-24 NOTE — PATIENT INSTRUCTIONS
Medicare Wellness  Personal Prevention Plan of Service     Date of Office Visit:    Encounter Provider:  BRIDGET Carter  Place of Service:  [facility]  Patient Name: Omar Can  :  1960    As part of the Medicare Wellness portion of your visit today, we are providing you with this personalized preventive plan of services (PPPS). This plan is based upon recommendations of the [place] Preventive Services Task Force (USPSTF) and the Advisory Committee on Immunization Practices (ACIP).    This lists the preventive care services that should be considered, and provides dates of when you are due. Items listed as completed are up-to-date and do not require any further intervention.    Health Maintenance   Topic Date Due    AAA SCREEN (ONE-TIME)  2023 (Originally 8/3/2023)    Pneumococcal Vaccine 65+ (1 - PCV) 10/07/2023 (Originally 1966)    COVID-19 Vaccine (5 - Pfizer series) 10/31/2023 (Originally 2023)    ZOSTER VACCINE (1 of 2) 2024 (Originally 2008)    INFLUENZA VACCINE  10/01/2023    BMI FOLLOWUP  2024    HEMOGLOBIN A1C  2024    DIABETIC EYE EXAM  2024    LIPID PANEL  2024    ANNUAL WELLNESS VISIT  2024    DIABETIC FOOT EXAM  2024    URINE MICROALBUMIN  2024    COLORECTAL CANCER SCREENING  2031    TDAP/TD VACCINES (2 - Td or Tdap) 2032    HEPATITIS C SCREENING  Completed       Orders Placed This Encounter   Procedures    PSA Screen     Standing Status:   Future     Number of Occurrences:   1     Standing Expiration Date:   2024     Order Specific Question:   Release to patient     Answer:   Routine Release [[id number]]    POC Glycosylated Hemoglobin (Hb A1C)     Order Specific Question:   Release to patient     Answer:   Routine Release [[id number]]    POC Microalbumin     Order Specific Question:   Release to patient     Answer:   Routine Release [[id number]]        Return in about 3 months (around 12/21/2023), or if symptoms worsen or fail to improve, for Next scheduled follow up.

## 2023-09-24 NOTE — ASSESSMENT & PLAN NOTE
Diabetes is improving with treatment. A1C today 7.6.  Continue current treatment regimen.  Dietary recommendations for ADA diet.  Regular aerobic exercise.  Diabetes will be reassessed in 3 months.

## 2023-09-25 ENCOUNTER — TREATMENT (OUTPATIENT)
Dept: CARDIAC REHAB | Facility: HOSPITAL | Age: 63
End: 2023-09-25

## 2023-09-25 ENCOUNTER — TELEPHONE (OUTPATIENT)
Dept: FAMILY MEDICINE CLINIC | Facility: CLINIC | Age: 63
End: 2023-09-25

## 2023-09-25 DIAGNOSIS — Z95.5 STENTED CORONARY ARTERY: Primary | ICD-10-CM

## 2023-09-25 DIAGNOSIS — K62.5 RECTAL BLEEDING: Primary | ICD-10-CM

## 2023-09-25 PROCEDURE — 93798 PHYS/QHP OP CAR RHAB W/ECG: CPT

## 2023-09-25 NOTE — TELEPHONE ENCOUNTER
"Caller: Omar Can \"Don\"    Relationship: Self    Best call back number: [phone]     What is the medical concern/diagnosis: BLOOD IN STOOL    What specialty or service is being requested:     What is the provider, practice or medical service name: NATALIO DELCID    What is the office location: [facility] [place]    What is the office phone number: [phone]    Any additional details:   "

## 2023-09-25 NOTE — TELEPHONE ENCOUNTER
"I advised the pt that Diana simon placed a referral for   GI      Caller: Omar Can \"Don\"    Relationship: Self    Best call back number: [phone]     What is the medical concern/diagnosis: BLOOD IN STOOL    What specialty or service is being requested:     What is the provider, practice or medical service name: NATALIO DELCID    What is the office location: [facility] [place]    What is the office phone number: [phone]    Any additional details:   "

## 2023-09-26 DIAGNOSIS — F41.9 ANXIETY: ICD-10-CM

## 2023-09-26 DIAGNOSIS — G47.00 INSOMNIA, UNSPECIFIED TYPE: ICD-10-CM

## 2023-09-26 NOTE — TELEPHONE ENCOUNTER
Rx Refill Note  Requested Prescriptions     Pending Prescriptions Disp Refills    hydrOXYzine (ATARAX) 25 MG tablet 30 tablet 2     Sig: Take 1 tablet by mouth At Night As Needed (insomnia).      Last office visit with prescribing clinician: 9/21/2023   Last telemedicine visit with prescribing clinician: Visit date not found   Next office visit with prescribing clinician: Visit date not found                         Would you like a call back once the refill request has been completed: [] Yes [] No    If the office needs to give you a call back, can they leave a voicemail: [] Yes [] No    Nichol Sifuentes  09/26/23, 11:09 EDT

## 2023-09-27 ENCOUNTER — TREATMENT (OUTPATIENT)
Dept: CARDIAC REHAB | Facility: HOSPITAL | Age: 63
End: 2023-09-27
Payer: MEDICARE

## 2023-09-27 DIAGNOSIS — Z95.5 STENTED CORONARY ARTERY: Primary | ICD-10-CM

## 2023-09-27 PROCEDURE — 93798 PHYS/QHP OP CAR RHAB W/ECG: CPT

## 2023-09-27 RX ORDER — SERTRALINE HYDROCHLORIDE 25 MG/1
25 TABLET, FILM COATED ORAL DAILY
Qty: 30 TABLET | Refills: 1 | Status: SHIPPED | OUTPATIENT
Start: 2023-09-27

## 2023-09-27 RX ORDER — HYDROXYZINE HYDROCHLORIDE 25 MG/1
25 TABLET, FILM COATED ORAL NIGHTLY PRN
Qty: 30 TABLET | Refills: 2 | Status: SHIPPED | OUTPATIENT
Start: 2023-09-27

## 2023-09-29 ENCOUNTER — TREATMENT (OUTPATIENT)
Dept: CARDIAC REHAB | Facility: HOSPITAL | Age: 63
End: 2023-09-29
Payer: MEDICARE

## 2023-09-29 DIAGNOSIS — Z95.5 STENTED CORONARY ARTERY: Primary | ICD-10-CM

## 2023-09-29 PROCEDURE — 93798 PHYS/QHP OP CAR RHAB W/ECG: CPT

## 2023-10-02 ENCOUNTER — TREATMENT (OUTPATIENT)
Dept: CARDIAC REHAB | Facility: HOSPITAL | Age: 63
End: 2023-10-02
Payer: MEDICARE

## 2023-10-02 DIAGNOSIS — Z95.5 STENTED CORONARY ARTERY: Primary | ICD-10-CM

## 2023-10-02 PROCEDURE — 93798 PHYS/QHP OP CAR RHAB W/ECG: CPT

## 2023-10-03 RX ORDER — APIXABAN 5 MG/1
TABLET, FILM COATED ORAL
Qty: 60 TABLET | Refills: 0 | Status: SHIPPED | OUTPATIENT
Start: 2023-10-03

## 2023-10-04 ENCOUNTER — TREATMENT (OUTPATIENT)
Dept: CARDIAC REHAB | Facility: HOSPITAL | Age: 63
End: 2023-10-04
Payer: MEDICARE

## 2023-10-04 DIAGNOSIS — Z95.5 STENTED CORONARY ARTERY: Primary | ICD-10-CM

## 2023-10-04 PROCEDURE — 93798 PHYS/QHP OP CAR RHAB W/ECG: CPT

## 2023-10-06 ENCOUNTER — TREATMENT (OUTPATIENT)
Dept: CARDIAC REHAB | Facility: HOSPITAL | Age: 63
End: 2023-10-06
Payer: MEDICARE

## 2023-10-06 DIAGNOSIS — Z95.5 STENTED CORONARY ARTERY: Primary | ICD-10-CM

## 2023-10-06 PROCEDURE — 93798 PHYS/QHP OP CAR RHAB W/ECG: CPT

## 2023-10-09 ENCOUNTER — TREATMENT (OUTPATIENT)
Dept: CARDIAC REHAB | Facility: HOSPITAL | Age: 63
End: 2023-10-09
Payer: MEDICARE

## 2023-10-09 DIAGNOSIS — Z95.5 STENTED CORONARY ARTERY: Primary | ICD-10-CM

## 2023-10-09 PROCEDURE — 93798 PHYS/QHP OP CAR RHAB W/ECG: CPT

## 2023-10-11 ENCOUNTER — TREATMENT (OUTPATIENT)
Dept: CARDIAC REHAB | Facility: HOSPITAL | Age: 63
End: 2023-10-11
Payer: MEDICARE

## 2023-10-11 DIAGNOSIS — Z95.5 STENTED CORONARY ARTERY: Primary | ICD-10-CM

## 2023-10-11 PROCEDURE — 93798 PHYS/QHP OP CAR RHAB W/ECG: CPT

## 2023-10-13 ENCOUNTER — TREATMENT (OUTPATIENT)
Dept: CARDIAC REHAB | Facility: HOSPITAL | Age: 63
End: 2023-10-13
Payer: MEDICARE

## 2023-10-13 DIAGNOSIS — Z95.5 STENTED CORONARY ARTERY: Primary | ICD-10-CM

## 2023-10-13 PROCEDURE — 93798 PHYS/QHP OP CAR RHAB W/ECG: CPT

## 2023-10-16 ENCOUNTER — TREATMENT (OUTPATIENT)
Dept: CARDIAC REHAB | Facility: HOSPITAL | Age: 63
End: 2023-10-16
Payer: MEDICARE

## 2023-10-16 DIAGNOSIS — Z95.5 STENTED CORONARY ARTERY: Primary | ICD-10-CM

## 2023-10-16 PROCEDURE — 93798 PHYS/QHP OP CAR RHAB W/ECG: CPT

## 2023-10-18 ENCOUNTER — TREATMENT (OUTPATIENT)
Dept: CARDIAC REHAB | Facility: HOSPITAL | Age: 63
End: 2023-10-18
Payer: MEDICARE

## 2023-10-18 DIAGNOSIS — Z95.5 STENTED CORONARY ARTERY: Primary | ICD-10-CM

## 2023-10-18 PROCEDURE — 93798 PHYS/QHP OP CAR RHAB W/ECG: CPT

## 2023-10-20 ENCOUNTER — TREATMENT (OUTPATIENT)
Dept: CARDIAC REHAB | Facility: HOSPITAL | Age: 63
End: 2023-10-20
Payer: MEDICARE

## 2023-10-20 DIAGNOSIS — Z95.5 STENTED CORONARY ARTERY: Primary | ICD-10-CM

## 2023-10-20 PROCEDURE — 93798 PHYS/QHP OP CAR RHAB W/ECG: CPT

## 2023-10-23 ENCOUNTER — TREATMENT (OUTPATIENT)
Dept: CARDIAC REHAB | Facility: HOSPITAL | Age: 63
End: 2023-10-23
Payer: MEDICARE

## 2023-10-23 DIAGNOSIS — Z95.5 STENTED CORONARY ARTERY: Primary | ICD-10-CM

## 2023-10-23 PROCEDURE — 93798 PHYS/QHP OP CAR RHAB W/ECG: CPT

## 2023-10-24 DIAGNOSIS — Z76.0 MEDICATION REFILL: ICD-10-CM

## 2023-10-24 DIAGNOSIS — E78.5 HYPERLIPIDEMIA, UNSPECIFIED HYPERLIPIDEMIA TYPE: ICD-10-CM

## 2023-10-24 RX ORDER — ATORVASTATIN CALCIUM 40 MG/1
40 TABLET, FILM COATED ORAL NIGHTLY
Qty: 90 TABLET | Refills: 0 | Status: SHIPPED | OUTPATIENT
Start: 2023-10-24

## 2023-10-25 ENCOUNTER — TREATMENT (OUTPATIENT)
Dept: CARDIAC REHAB | Facility: HOSPITAL | Age: 63
End: 2023-10-25
Payer: MEDICARE

## 2023-10-25 DIAGNOSIS — Z95.5 STENTED CORONARY ARTERY: Primary | ICD-10-CM

## 2023-10-25 PROCEDURE — 93798 PHYS/QHP OP CAR RHAB W/ECG: CPT

## 2023-10-27 ENCOUNTER — TREATMENT (OUTPATIENT)
Dept: CARDIAC REHAB | Facility: HOSPITAL | Age: 63
End: 2023-10-27
Payer: MEDICARE

## 2023-10-27 DIAGNOSIS — Z95.5 STENTED CORONARY ARTERY: Primary | ICD-10-CM

## 2023-10-27 PROCEDURE — 93798 PHYS/QHP OP CAR RHAB W/ECG: CPT

## 2023-10-29 DIAGNOSIS — Z76.0 MEDICATION REFILL: ICD-10-CM

## 2023-10-29 DIAGNOSIS — E11.9 TYPE 2 DIABETES MELLITUS TREATED WITHOUT INSULIN: ICD-10-CM

## 2023-10-30 ENCOUNTER — TREATMENT (OUTPATIENT)
Dept: CARDIAC REHAB | Facility: HOSPITAL | Age: 63
End: 2023-10-30
Payer: MEDICARE

## 2023-10-30 DIAGNOSIS — Z95.5 STENTED CORONARY ARTERY: Primary | ICD-10-CM

## 2023-10-30 PROCEDURE — 93798 PHYS/QHP OP CAR RHAB W/ECG: CPT

## 2023-10-30 RX ORDER — GLIMEPIRIDE 4 MG/1
4 TABLET ORAL 2 TIMES DAILY
Qty: 180 TABLET | Refills: 0 | Status: SHIPPED | OUTPATIENT
Start: 2023-10-30

## 2023-10-30 NOTE — PROGRESS NOTES
Attended Phase II Cardiac Rehab. No medication or health history changes reported. See [facility] for details.  This is patient's last Phase II day due to insurance changes, per patient. DC teaching completed.

## 2023-10-31 DIAGNOSIS — Z76.0 MEDICATION REFILL: ICD-10-CM

## 2023-10-31 DIAGNOSIS — I10 PRIMARY HYPERTENSION: ICD-10-CM

## 2023-10-31 RX ORDER — CARVEDILOL 25 MG/1
25 TABLET ORAL 2 TIMES DAILY WITH MEALS
Qty: 60 TABLET | Refills: 0 | Status: SHIPPED | OUTPATIENT
Start: 2023-10-31

## 2023-10-31 RX ORDER — APIXABAN 5 MG/1
TABLET, FILM COATED ORAL
Qty: 60 TABLET | Refills: 1 | Status: SHIPPED | OUTPATIENT
Start: 2023-10-31

## 2023-10-31 NOTE — PROGRESS NOTES
OFFICE VISIT  NOTE  [facility] CARDIOLOGY      Name: Omar Can    Date: 2023  MRN:  [id number]  :  1960      REFERRING/PRIMARY PROVIDER:  Diana Pardo APRN     Chief Complaint   Patient presents with    Follow-up     HPI: Omar Can is a 63 y.o. male who presents today for follow up of CAD, recent cath and PCI 2023. History of CAD, ICM, s/p Medtronic BiV ICD, VF, PAF, HTN, HLD, DM2 and CHEKO. Had VF with ICD shocks 2023 and subsequent cath with mid RCA 95% ISR s/p overlapping WILLEM x2, ostial LCx s/p lithotripsy and overlapping WILLEM x2, widely patent LAD stents.  Echo 2023 with normal EF, mild LVH, bright echogenic area on lead which most likely represents a thrombus, but vegetation not excluded. He is on Plavix and Eliquis.  Doing well denies chest pain or shortness of breath, no fevers or chills.  Exercising routinely.  Only complaint is lightheadedness when he stands up current over the past several months getting worse.  Drinks plenty of water during the day.    ROS:Pertinent positives as listed in the HPI.  All other systems reviewed and negative.    Past Medical History:   Diagnosis Date    Arthritis     Benign prostatic hyperplasia     CHF (congestive heart failure)     Colon polyps     Coronary artery disease     Coronary stent patent     x 5 stents     Diabetes mellitus     Elevated cholesterol     Hypertension     Myocardial infarction     cardiac arrest    Obesity     Paroxysmal atrial fibrillation 2023    Prostate hypertrophy     Sleep apnea     cpap nightly       Past Surgical History:   Procedure Laterality Date    CARDIAC CATHETERIZATION      CARDIAC CATHETERIZATION N/A 2023    Procedure: Left Heart Cath;  Surgeon: Rich Li MD;  Location: [facility] CATH INVASIVE LOCATION;  Service: Cardiovascular;  Laterality: N/A;    CARDIAC CATHETERIZATION N/A 2023    Procedure: Stent WILLEM coronary;  Surgeon: Rich Li MD;  Location:   [facility] CATH INVASIVE LOCATION;  Service: Cardiovascular;  Laterality: N/A;    CARDIAC CATHETERIZATION N/A 2023    Procedure: Optical Coherence Tomography;  Surgeon: Rich Li MD;  Location: [facility] CATH INVASIVE LOCATION;  Service: Cardiovascular;  Laterality: N/A;    CARDIAC DEFIBRILLATOR PLACEMENT  2010    Implanted after cardiac arrest per patient    CARDIAC ELECTROPHYSIOLOGY PROCEDURE N/A 2023    Procedure: Lead Revision- new RV lead- has Medtronic device- holding Eliquis as of 23;  Surgeon: Brayan Epps MD;  Location: [facility] EP INVASIVE LOCATION;  Service: Cardiology;  Laterality: N/A;    COLONOSCOPY      CORONARY STENT PLACEMENT      5 stents placed at one time    CYSTOSCOPY      CYSTOSCOPY TRANSURETHRAL RESECTION OF PROSTATE N/A 2022    Procedure: GREENLIGHT LASER VAPORIZATION OF PROSTATE;  Surgeon: Todd Ponce MD;  Location: [facility] OR;  Service: Urology;  Laterality: N/A;    CYSTOSCOPY TRANSURETHRAL RESECTION OF PROSTATE N/A 2022    Procedure: TRANSURETHRAL RESECTION OF PROSTATE WITH GREENLIGHT LASER;  Surgeon: Todd Ponce MD;  Location: [facility] OR;  Service: Urology;  Laterality: N/A;    INSERT / REPLACE / REMOVE PACEMAKER  2010    JOINT REPLACEMENT  2018    KNEE ARTHROSCOPY Right     OTHER SURGICAL HISTORY      defibulator with pacemaker --Medtronic, Cardiologist Dr. Сергей Lees [facility]    PACEMAKER IMPLANTATION  2019    battery change per patient    PROSTATE SURGERY      in march and dec    REPLACEMENT TOTAL KNEE Left 2018    VASECTOMY  1985    VESICOSTOMY      WISDOM TOOTH EXTRACTION      3 out 4 removed.        Social History     Socioeconomic History    Marital status:    Tobacco Use    Smoking status: Former     Packs/day: 1.00     Years: 25.00     Additional pack years: 0.00     Total pack years: 25.00     Types: Cigarettes, Pipe     Quit date: 1996     Years since quittin.8    Smokeless tobacco: Never     Tobacco comments:     quit 25 years ago   Vaping Use    Vaping Use: Never used   Substance and Sexual Activity    Alcohol use: Not Currently    Drug use: Never    Sexual activity: Not Currently     Partners: Female     Birth control/protection: Surgical, Vasectomy       Family History   Problem Relation Age of Onset    Hypertension Mother     Heart failure Mother     Stroke Mother     Diabetes Mother     Heart disease Mother     Heart attack Father     Diabetes Father     Heart disease Father     Vision loss Father     No Known Problems Sister     No Known Problems Sister     Leukemia Sister     Cerebral palsy Brother     Hearing loss Brother     No Known Problems Brother     No Known Problems Brother     No Known Problems Daughter     Diabetes Son         Allergies   Allergen Reactions    Cyclobenzaprine Hives    Floxacillin (Flucloxacillin) Hives       Current Outpatient Medications   Medication Instructions    amLODIPine (NORVASC) 10 mg, Oral, Daily    apixaban (Eliquis) 5 MG tablet tablet TAKE 1 TABLET BY MOUTH EVERY 12 HOURS    atorvastatin (LIPITOR) 40 mg, Oral, Nightly    carvedilol (COREG) 25 mg, Oral, 2 Times Daily With Meals    clopidogrel (PLAVIX) 75 mg, Oral, Daily    coenzyme Q10 100 mg, Oral, Daily    glimepiride (AMARYL) 4 mg, Oral, 2 Times Daily    glucose blood (OneTouch Verio) test strip USE TO CHECK BLOOD GLUCOSE ONCE DAILY OR AS DIRECTED    glucose monitor monitoring kit 1 each, Does not apply, As Needed    hydrALAZINE (APRESOLINE) 50 mg, Oral, 2 Times Daily    hydrOXYzine (ATARAX) 25 mg, Oral, Nightly PRN    Lancets (onetouch ultrasoft) lancets Check daily and prn    metFORMIN (GLUCOPHAGE) 850 MG tablet TAKE 1/2 TABLET BY MOUTH 2 TIMES A DAY    Multiple Vitamin (MULTI-VITAMIN DAILY PO) 1 tablet, Oral, Daily    nitroglycerin (NITROSTAT) 0.3 mg, Sublingual, Every 5 Minutes PRN, Take no more than 3 doses in 15 minutes.     ondansetron ODT (ZOFRAN-ODT) 4 mg, Translingual, Every 8 Hours PRN     "Ozempic (0.25 or 0.5 MG/DOSE) 0.25 mg, Subcutaneous, Weekly    Potassium Gluconate 2 MEQ tablet 2 mEq, Oral, Daily    sertraline (ZOLOFT) 25 mg, Oral, Daily    spironolactone (ALDACTONE) 12.5 mg, Oral, Daily    tamsulosin (FLOMAX) 0.4 mg, Oral, Daily    valsartan (DIOVAN) 320 mg, Oral, Daily       Vitals:    11/02/23 0924   BP: 122/70   BP Location: Left arm   Patient Position: Sitting   Cuff Size: Adult   Pulse: 73   SpO2: 97%   Weight: 120 kg (264 lb)   Height: 182.9 cm (72\")     Body mass index is 35.8 kg/m².    PHYSICAL EXAM:    General Appearance:   well developed  well nourished  Neck:  thyroid not enlarged  supple  Respiratory:  no respiratory distress  normal breath sounds  no rales  Cardiovascular:  no jugular venous distention  regular rhythm  apical impulse normal  S1 normal, S2 normal  no S3, no S4   no murmur  no rub, no thrill  carotid pulses normal; no bruit  pedal pulses normal  lower extremity edema: none    Skin:   warm, dry    RESULTS:   Procedures    Results for orders placed during the hospital encounter of 07/24/23    Adult Transthoracic Echo Complete w/ Color, Spectral and Contrast if necessary per protocol    Interpretation Summary    Left ventricular systolic function is normal. Estimated left ventricular EF = 60%    Left ventricular wall thickness is consistent with mild concentric hypertrophy.    Mild mitral and tricuspid regurgitation.    Calculated right ventricular systolic pressure from tricuspid regurgitation is 30 mmHg.    Bright, thickened, echogenic area on lead which appears slightly mobile.  Most likely thrombus but cannot exclude vegetation.  Clinical correlation required.        Labs:  Lab Results   Component Value Date    CHOL 146 07/28/2023    TRIG 262 (H) 07/28/2023    HDL 31 (L) 07/28/2023    LDL 72 07/28/2023    AST 26 07/28/2023    ALT 32 07/28/2023     Lab Results   Component Value Date    HGBA1C 7.6 09/21/2023     Creatinine   Date Value Ref Range Status   07/28/2023 " 1.10 0.60 - 1.30 mg/dL Final   07/28/2023 1.18 0.76 - 1.27 mg/dL Final   06/21/2023 1.07 0.76 - 1.27 mg/dL Final   04/26/2023 1.04 0.76 - 1.27 mg/dL Final     eGFR Non  Am   Date Value Ref Range Status   02/24/2021 60 (L) >60 mL/min/1.73 Final     Comment:     GFR Normal >60  Chronic Kidney Disease <60  Kidney Failure <15     06/27/2020 50 (L) >60 mL/min/1.73 Final     Comment:         GFR Normal              >60      Chronic Kidney Disease  <60      Kidney Failure          <15     01/03/2020 52 (L) >60 mL/min/1.73 Final     eGFR Non  Amer   Date Value Ref Range Status   06/27/2020 50 (L) >60 mL/min/1.73 Final         ASSESSMENT:  Problem List Items Addressed This Visit       Hyperlipidemia (Chronic)    Overview     From Automated Load;Provider: Margarito Damon;Status: Active         Obstructive sleep apnea syndrome (Chronic)    Coronary arteriosclerosis in native artery - Primary    Overview     7/20/2023: LHC at [facility], mid RCA 95% ISR status post PCI with 4.0 x 28 overlapping with a 3.5 x 12 mm Xience WILLEM, ostial circumflex 90% heavily calcified stenosis in mid circumflex 90% stenosis status post PCI with overlapping 3.0 x 33 and 3.0 x 18 mm Xience WILLEM postdilated with 3.5 NC proximally.  Widely patent LAD stents with no significant ISR, LVEDP 4 mm [place]  From Automated Load;Provider: Margarito Damon;Status: Active         Biventricular implantable cardioverter-defibrillator (ICD) in situ (Chronic)    Primary hypertension (Chronic)    Relevant Medications    spironolactone (ALDACTONE) 25 MG tablet    Paroxysmal atrial fibrillation    VT (ventricular tachycardia)    Overview     Added automatically from request for surgery [id number]            PLAN:    Coronary artery disease   Recent cath 07/2023 with overlapping WILLEM x2 to RCA and overlapping WILLEM x2 to proximal LCx; widely patent LAD stents  Continue Plavix, Eliquis   Continue statin and Coreg    2.   ICM, s/p BiV ICD   Recent echo 07/2023 with normal  EF  ICD followed by EP    Dr. Kramer commented on an echogenic area on one of his device leads possible thrombus on echo July 2023, he has no signs or symptoms of infection.  I will discuss with Dr. Epps, we will continue anticoagulation with Plavix and Eliquis.  May also be scar tissue formation.    3.  PAF  On Eliquis, followed by Dr. Epps     4. Hypertension   Having orthostatic symptoms advised patient to decrease spironolactone to 12.5 mg daily if this does not help in 2 weeks I would stop the medication completely.    Continue taking blood pressure at home, goal is 140 systolic    5. Hyperlipidemia   Goal LDL less than 70, continue atorvastatin 40 mg daily      Advance Care Planning   ACP discussion was held with the patient during this visit. Patient has an advance directive (not in EMR), copy requested.          Follow-up   Return in about 9 months (around 8/2/2024).  Rich Li MD, FACC, [facility]  Interventional Cardiology

## 2023-11-02 ENCOUNTER — OFFICE VISIT (OUTPATIENT)
Dept: CARDIOLOGY | Facility: CLINIC | Age: 63
End: 2023-11-02
Payer: MEDICARE

## 2023-11-02 VITALS
WEIGHT: 264 LBS | BODY MASS INDEX: 35.76 KG/M2 | HEART RATE: 73 BPM | HEIGHT: 72 IN | OXYGEN SATURATION: 97 % | DIASTOLIC BLOOD PRESSURE: 70 MMHG | SYSTOLIC BLOOD PRESSURE: 122 MMHG

## 2023-11-02 DIAGNOSIS — I47.20 VT (VENTRICULAR TACHYCARDIA): ICD-10-CM

## 2023-11-02 DIAGNOSIS — E78.2 MIXED HYPERLIPIDEMIA: Chronic | ICD-10-CM

## 2023-11-02 DIAGNOSIS — I48.0 PAROXYSMAL ATRIAL FIBRILLATION: ICD-10-CM

## 2023-11-02 DIAGNOSIS — I10 PRIMARY HYPERTENSION: Chronic | ICD-10-CM

## 2023-11-02 DIAGNOSIS — G47.33 OBSTRUCTIVE SLEEP APNEA SYNDROME: Chronic | ICD-10-CM

## 2023-11-02 DIAGNOSIS — Z95.810 BIVENTRICULAR IMPLANTABLE CARDIOVERTER-DEFIBRILLATOR (ICD) IN SITU: Chronic | ICD-10-CM

## 2023-11-02 DIAGNOSIS — I25.10 CORONARY ARTERIOSCLEROSIS IN NATIVE ARTERY: Primary | ICD-10-CM

## 2023-11-02 PROCEDURE — 99214 OFFICE O/P EST MOD 30 MIN: CPT | Performed by: INTERNAL MEDICINE

## 2023-11-02 PROCEDURE — 3074F SYST BP LT 130 MM HG: CPT | Performed by: INTERNAL MEDICINE

## 2023-11-02 PROCEDURE — 3078F DIAST BP <80 MM HG: CPT | Performed by: INTERNAL MEDICINE

## 2023-11-02 RX ORDER — SPIRONOLACTONE 25 MG/1
12.5 TABLET ORAL DAILY
Status: SHIPPED
Start: 2023-11-02

## 2023-11-20 DIAGNOSIS — F41.9 ANXIETY: ICD-10-CM

## 2023-11-20 RX ORDER — SERTRALINE HYDROCHLORIDE 25 MG/1
25 TABLET, FILM COATED ORAL DAILY
Qty: 30 TABLET | Refills: 0 | Status: SHIPPED | OUTPATIENT
Start: 2023-11-20

## 2023-11-22 ENCOUNTER — OFFICE VISIT (OUTPATIENT)
Dept: FAMILY MEDICINE CLINIC | Facility: CLINIC | Age: 63
End: 2023-11-22
Payer: MEDICARE

## 2023-11-22 ENCOUNTER — LAB (OUTPATIENT)
Dept: LAB | Facility: HOSPITAL | Age: 63
End: 2023-11-22
Payer: MEDICARE

## 2023-11-22 VITALS
WEIGHT: 263.6 LBS | BODY MASS INDEX: 34.94 KG/M2 | DIASTOLIC BLOOD PRESSURE: 80 MMHG | HEIGHT: 73 IN | HEART RATE: 73 BPM | TEMPERATURE: 98.3 F | SYSTOLIC BLOOD PRESSURE: 122 MMHG | OXYGEN SATURATION: 98 %

## 2023-11-22 DIAGNOSIS — R31.9 HEMATURIA, UNSPECIFIED TYPE: ICD-10-CM

## 2023-11-22 DIAGNOSIS — E11.65 TYPE 2 DIABETES MELLITUS WITH HYPERGLYCEMIA, WITHOUT LONG-TERM CURRENT USE OF INSULIN: Chronic | ICD-10-CM

## 2023-11-22 DIAGNOSIS — R31.0 GROSS HEMATURIA: Primary | ICD-10-CM

## 2023-11-22 LAB
ALBUMIN SERPL-MCNC: 4.5 G/DL (ref 3.5–5.2)
ALBUMIN/GLOB SERPL: 1.8 G/DL
ALP SERPL-CCNC: 77 U/L (ref 39–117)
ALT SERPL W P-5'-P-CCNC: 28 U/L (ref 1–41)
ANION GAP SERPL CALCULATED.3IONS-SCNC: 13.1 MMOL/L (ref 5–15)
AST SERPL-CCNC: 23 U/L (ref 1–40)
BILIRUB BLD-MCNC: NEGATIVE MG/DL
BILIRUB SERPL-MCNC: 0.3 MG/DL (ref 0–1.2)
BUN SERPL-MCNC: 14 MG/DL (ref 8–23)
BUN/CREAT SERPL: 12.2 (ref 7–25)
CALCIUM SPEC-SCNC: 9.7 MG/DL (ref 8.6–10.5)
CHLORIDE SERPL-SCNC: 99 MMOL/L (ref 98–107)
CLARITY, POC: CLEAR
CO2 SERPL-SCNC: 22.9 MMOL/L (ref 22–29)
COLOR UR: YELLOW
CREAT SERPL-MCNC: 1.15 MG/DL (ref 0.76–1.27)
DEPRECATED RDW RBC AUTO: 38.1 FL (ref 37–54)
EGFRCR SERPLBLD CKD-EPI 2021: 70.6 ML/MIN/1.73
ERYTHROCYTE [DISTWIDTH] IN BLOOD BY AUTOMATED COUNT: 13 % (ref 12.3–15.4)
EXPIRATION DATE: ABNORMAL
GLOBULIN UR ELPH-MCNC: 2.5 GM/DL
GLUCOSE SERPL-MCNC: 180 MG/DL (ref 65–99)
GLUCOSE UR STRIP-MCNC: NEGATIVE MG/DL
HCT VFR BLD AUTO: 34.6 % (ref 37.5–51)
HGB BLD-MCNC: 11.5 G/DL (ref 13–17.7)
KETONES UR QL: NEGATIVE
LEUKOCYTE EST, POC: NEGATIVE
Lab: ABNORMAL
MCH RBC QN AUTO: 27.1 PG (ref 26.6–33)
MCHC RBC AUTO-ENTMCNC: 33.2 G/DL (ref 31.5–35.7)
MCV RBC AUTO: 81.6 FL (ref 79–97)
NITRITE UR-MCNC: NEGATIVE MG/ML
PH UR: 6 [PH] (ref 5–8)
PLATELET # BLD AUTO: 257 10*3/MM3 (ref 140–450)
PMV BLD AUTO: 11.1 FL (ref 6–12)
POTASSIUM SERPL-SCNC: 4.5 MMOL/L (ref 3.5–5.2)
PROT SERPL-MCNC: 7 G/DL (ref 6–8.5)
PROT UR STRIP-MCNC: ABNORMAL MG/DL
RBC # BLD AUTO: 4.24 10*6/MM3 (ref 4.14–5.8)
RBC # UR STRIP: NEGATIVE /UL
SODIUM SERPL-SCNC: 135 MMOL/L (ref 136–145)
SP GR UR: 1.01 (ref 1–1.03)
UROBILINOGEN UR QL: ABNORMAL
WBC NRBC COR # BLD AUTO: 6.05 10*3/MM3 (ref 3.4–10.8)

## 2023-11-22 PROCEDURE — 3079F DIAST BP 80-89 MM HG: CPT | Performed by: NURSE PRACTITIONER

## 2023-11-22 PROCEDURE — 81003 URINALYSIS AUTO W/O SCOPE: CPT | Performed by: NURSE PRACTITIONER

## 2023-11-22 PROCEDURE — 80053 COMPREHEN METABOLIC PANEL: CPT

## 2023-11-22 PROCEDURE — 1159F MED LIST DOCD IN RCRD: CPT | Performed by: NURSE PRACTITIONER

## 2023-11-22 PROCEDURE — 99214 OFFICE O/P EST MOD 30 MIN: CPT | Performed by: NURSE PRACTITIONER

## 2023-11-22 PROCEDURE — 85027 COMPLETE CBC AUTOMATED: CPT

## 2023-11-22 PROCEDURE — 3074F SYST BP LT 130 MM HG: CPT | Performed by: NURSE PRACTITIONER

## 2023-11-22 PROCEDURE — 1160F RVW MEDS BY RX/DR IN RCRD: CPT | Performed by: NURSE PRACTITIONER

## 2023-11-22 PROCEDURE — 3051F HG A1C>EQUAL 7.0%<8.0%: CPT | Performed by: NURSE PRACTITIONER

## 2023-11-22 RX ORDER — SEMAGLUTIDE 0.68 MG/ML
0.5 INJECTION, SOLUTION SUBCUTANEOUS WEEKLY
Qty: 3 ML | Refills: 2 | Status: SHIPPED | OUTPATIENT
Start: 2023-11-22

## 2023-11-23 PROBLEM — R31.0 GROSS HEMATURIA: Status: ACTIVE | Noted: 2023-11-23

## 2023-11-23 NOTE — ASSESSMENT & PLAN NOTE
New problem which requires further workup. Diagnostic imaging per orders. Further recommendation after results evaluation.  Advised patient to f/u with his urologist and nephrologist.

## 2023-11-23 NOTE — ASSESSMENT & PLAN NOTE
Diabetes not controlled.  Dietary recommendations for ADA diet.  Medication changes per orders.  Diabetes will be reassessed in 3 months.  Plan to increase dosage of Ozempic from 0.25 mg weekly to 0.5 mg weekly.

## 2023-11-30 DIAGNOSIS — I10 PRIMARY HYPERTENSION: ICD-10-CM

## 2023-11-30 DIAGNOSIS — Z76.0 MEDICATION REFILL: ICD-10-CM

## 2023-11-30 RX ORDER — CARVEDILOL 25 MG/1
25 TABLET ORAL 2 TIMES DAILY WITH MEALS
Qty: 60 TABLET | Refills: 0 | Status: SHIPPED | OUTPATIENT
Start: 2023-11-30

## 2023-12-11 RX ORDER — VALSARTAN 320 MG/1
320 TABLET ORAL DAILY
Qty: 90 TABLET | Refills: 0 | Status: SHIPPED | OUTPATIENT
Start: 2023-12-11

## 2023-12-11 NOTE — TELEPHONE ENCOUNTER
Rx Refill Note  Requested Prescriptions     Pending Prescriptions Disp Refills    valsartan (DIOVAN) 320 MG tablet [Pharmacy Med Name: Valsartan Oral Tablet 320 MG] 90 tablet 0     Sig: TAKE 1 TABLET BY MOUTH EVERY DAY      Last office visit with prescribing clinician: 11/22/2023   Last telemedicine visit with prescribing clinician: Visit date not found   Next office visit with prescribing clinician: Visit date not found                         Would you like a call back once the refill request has been completed: [] Yes [] No    If the office needs to give you a call back, can they leave a voicemail: [] Yes [] No    Nichol Sifuentes  12/11/23, 10:30 EST

## 2023-12-12 DIAGNOSIS — Z76.0 MEDICATION REFILL: ICD-10-CM

## 2023-12-12 DIAGNOSIS — N40.0 PROSTATE HYPERTROPHY: ICD-10-CM

## 2023-12-12 RX ORDER — TAMSULOSIN HYDROCHLORIDE 0.4 MG/1
1 CAPSULE ORAL DAILY
Qty: 90 CAPSULE | Refills: 2 | Status: SHIPPED | OUTPATIENT
Start: 2023-12-12

## 2023-12-13 ENCOUNTER — HOSPITAL ENCOUNTER (OUTPATIENT)
Dept: CT IMAGING | Facility: HOSPITAL | Age: 63
Discharge: HOME | End: 2023-12-13
Admitting: NURSE PRACTITIONER
Payer: MEDICARE

## 2023-12-13 DIAGNOSIS — R31.0 GROSS HEMATURIA: ICD-10-CM

## 2023-12-13 PROCEDURE — 74176 CT ABD & PELVIS W/O CONTRAST: CPT

## 2023-12-15 ENCOUNTER — TELEPHONE (OUTPATIENT)
Dept: FAMILY MEDICINE CLINIC | Facility: CLINIC | Age: 63
End: 2023-12-15
Payer: MEDICARE

## 2023-12-15 DIAGNOSIS — R93.5 ABNORMAL CT OF THE ABDOMEN: Primary | ICD-10-CM

## 2023-12-15 DIAGNOSIS — Q61.02 MULTIPLE RENAL CYSTS: ICD-10-CM

## 2023-12-27 ENCOUNTER — HOSPITAL ENCOUNTER (OUTPATIENT)
Dept: CT IMAGING | Facility: HOSPITAL | Age: 63
Discharge: HOME | End: 2023-12-27
Admitting: NURSE PRACTITIONER
Payer: MEDICARE

## 2023-12-27 DIAGNOSIS — Q61.02 MULTIPLE RENAL CYSTS: ICD-10-CM

## 2023-12-27 DIAGNOSIS — F41.9 ANXIETY: ICD-10-CM

## 2023-12-27 DIAGNOSIS — R93.5 ABNORMAL CT OF THE ABDOMEN: ICD-10-CM

## 2023-12-27 PROCEDURE — 25510000001 IOPAMIDOL PER 1 ML: Performed by: NURSE PRACTITIONER

## 2023-12-27 PROCEDURE — 74177 CT ABD & PELVIS W/CONTRAST: CPT

## 2023-12-27 RX ORDER — SERTRALINE HYDROCHLORIDE 25 MG/1
25 TABLET, FILM COATED ORAL DAILY
Qty: 30 TABLET | Refills: 0 | Status: SHIPPED | OUTPATIENT
Start: 2023-12-27

## 2023-12-27 RX ADMIN — IOPAMIDOL 85 ML: 755 INJECTION, SOLUTION INTRAVENOUS at 11:30

## 2024-01-02 ENCOUNTER — TELEPHONE (OUTPATIENT)
Dept: FAMILY MEDICINE CLINIC | Facility: CLINIC | Age: 64
End: 2024-01-02

## 2024-01-02 DIAGNOSIS — I10 PRIMARY HYPERTENSION: ICD-10-CM

## 2024-01-02 DIAGNOSIS — E11.9 TYPE 2 DIABETES MELLITUS TREATED WITHOUT INSULIN: ICD-10-CM

## 2024-01-02 DIAGNOSIS — Z76.0 MEDICATION REFILL: ICD-10-CM

## 2024-01-02 NOTE — TELEPHONE ENCOUNTER
"  Caller: Omar Can \"Don\"    Relationship: Self    Best call back number: [phone]    What is the best time to reach you: ANYTIME    Who are you requesting to speak with (clinical staff, provider,  specific staff member): PROVIDER    What was the call regarding: PATIENT WOULD LIKE TO SPEAK TO MS JOEL RENEE REGARDING THE LAST TEST SHE SENT PATIENT TO HAVE DONE    "

## 2024-01-03 RX ORDER — APIXABAN 5 MG/1
TABLET, FILM COATED ORAL
Qty: 60 TABLET | Refills: 0 | Status: SHIPPED | OUTPATIENT
Start: 2024-01-03

## 2024-01-03 RX ORDER — CARVEDILOL 25 MG/1
25 TABLET ORAL 2 TIMES DAILY WITH MEALS
Qty: 60 TABLET | Refills: 0 | Status: SHIPPED | OUTPATIENT
Start: 2024-01-03

## 2024-01-12 ENCOUNTER — DOCUMENTATION (OUTPATIENT)
Dept: FAMILY MEDICINE CLINIC | Facility: CLINIC | Age: 64
End: 2024-01-12
Payer: MEDICARE

## 2024-01-12 DIAGNOSIS — K63.89 MESENTERIC MASS: Primary | ICD-10-CM

## 2024-01-12 NOTE — PROGRESS NOTES
I called and spoke with patient and discussed CT findings with him (2 cm nodule). As the radiologist was unable to determine the nature of the mass I recommended a consult with a general surgeon. Patient verbalized understanding and agreement with plan of care. Referral placed.

## 2024-01-16 DIAGNOSIS — E78.5 HYPERLIPIDEMIA, UNSPECIFIED HYPERLIPIDEMIA TYPE: ICD-10-CM

## 2024-01-16 DIAGNOSIS — Z76.0 MEDICATION REFILL: ICD-10-CM

## 2024-01-17 RX ORDER — ATORVASTATIN CALCIUM 40 MG/1
40 TABLET, FILM COATED ORAL
Qty: 90 TABLET | Refills: 0 | Status: SHIPPED | OUTPATIENT
Start: 2024-01-17

## 2024-01-24 ENCOUNTER — TELEPHONE (OUTPATIENT)
Dept: CARDIOLOGY | Facility: CLINIC | Age: 64
End: 2024-01-24
Payer: MEDICARE

## 2024-01-24 DIAGNOSIS — F41.9 ANXIETY: ICD-10-CM

## 2024-01-24 RX ORDER — SERTRALINE HYDROCHLORIDE 25 MG/1
25 TABLET, FILM COATED ORAL DAILY
Qty: 30 TABLET | Refills: 0 | Status: SHIPPED | OUTPATIENT
Start: 2024-01-24

## 2024-01-24 NOTE — TELEPHONE ENCOUNTER
[facility] requests CC and permission to hold Eliquis for 2 days prior to colonoscopy with Dr. Yi on 2/2.     Last seen 11/2023- CAD 07/2023 with overlapping WILLEM x2 to RCA and overlapping WILLEM x2 to proximal LCx; widely patent LAD stents.    ICM, s/p BiV ICD    WILLIAM- Dr. Kramer commented on an echogenic area on one of his device leads possible thrombus on echo July 2023, he has no signs or symptoms of infection.  I will discuss with Dr. Epps, we will continue anticoagulation with Plavix and Eliquis.  May also be scar tissue formation.     Pt has an upcoming appt with Dr. Epps on 1/30. Wait till appt for clearance?     F: [phone]

## 2024-01-30 ENCOUNTER — TELEPHONE (OUTPATIENT)
Dept: CARDIOLOGY | Facility: CLINIC | Age: 64
End: 2024-01-30
Payer: MEDICARE

## 2024-01-30 ENCOUNTER — OFFICE VISIT (OUTPATIENT)
Dept: CARDIOLOGY | Facility: CLINIC | Age: 64
End: 2024-01-30
Payer: MEDICARE

## 2024-01-30 VITALS
SYSTOLIC BLOOD PRESSURE: 104 MMHG | HEIGHT: 73 IN | WEIGHT: 262.4 LBS | DIASTOLIC BLOOD PRESSURE: 60 MMHG | BODY MASS INDEX: 34.78 KG/M2 | OXYGEN SATURATION: 98 % | HEART RATE: 57 BPM

## 2024-01-30 DIAGNOSIS — I47.20 VT (VENTRICULAR TACHYCARDIA): ICD-10-CM

## 2024-01-30 DIAGNOSIS — I10 PRIMARY HYPERTENSION: ICD-10-CM

## 2024-01-30 DIAGNOSIS — I48.0 PAROXYSMAL ATRIAL FIBRILLATION: ICD-10-CM

## 2024-01-30 DIAGNOSIS — Z76.0 MEDICATION REFILL: ICD-10-CM

## 2024-01-30 DIAGNOSIS — Z95.810 BIVENTRICULAR IMPLANTABLE CARDIOVERTER-DEFIBRILLATOR (ICD) IN SITU: Primary | Chronic | ICD-10-CM

## 2024-01-30 DIAGNOSIS — I25.119 CORONARY ARTERY DISEASE INVOLVING NATIVE CORONARY ARTERY OF NATIVE HEART WITH ANGINA PECTORIS: ICD-10-CM

## 2024-01-30 RX ORDER — FUROSEMIDE 20 MG/1
20 TABLET ORAL AS NEEDED
COMMUNITY

## 2024-01-30 NOTE — TELEPHONE ENCOUNTER
----- Message from Rich Li MD sent at 1/30/2024  9:27 AM EST -----  Yes, he should have stopped asa 1 month after stent. Sally, can you call and tell him to stop asa?  ----- Message -----  From: Brayan Epps MD  Sent: 1/30/2024   9:24 AM EST  To: Rich Li MD    I saw this jason today, he says he still taking triple therapy.  Wanted to see if he thought we could get off 1 of those?

## 2024-01-30 NOTE — PROGRESS NOTES
[facility]   OFFICE NOTE      Omar Can  1960  PCP: Diana Pardo APRN    SUBJECTIVE:   Omar Can is a 63 y.o. male seen for a follow up visit regarding the following:     CC:VF    HPI:   Pleasant 63-year-old gent returns office today for follow-up regarding ischemic cardiomyopathy, Medtronic ICD interrogation, new onset atrial fibrillation, ICD shocks for VF.  He had had an abnormal stress test after his ICD shocks which showed inferior ischemia.  He then underwent left heart cath with Dr. Li showing a 95% RCA ISR status post intervention.  He has done well since then without recurrent ICD shocks.  Overall continues to feel well.  Denies any major issues.  Blood pressures been good at home.  Remains on triple therapy at the current time.  Is also taking Ozempic with his dose recently increased to 0.5        Cardiac PMH: (Old records have been reviewed and summarized below)  Ischemic heart disease  Cardiac arrest admission to [facility] March 26, 2010 with left heart catheterization by Dr. MIKE with emergent catheter-based intervention.  (Database insufficiency)  Repeat left heart catheterization 48 hours with catheter-based invention 2 stents other vessels (database insufficiency)  MDT BIVICD placed 2010 [facility] (database insufficiency ) Dr. Quiroga  Medtronic BIVIICD generator change November 7, 2019  Echocardiogram May 20, 2021 LVH EF 60% mild AI  Normal myocardial perfusion study May 20, 2021 EF calculated 42%.  RV Lead Revision 4/26/2023 Dr. Epps 6/28/2023  Myocardial perfusion imaging indicates ischemia inferior wall and inferolateral wall EF 50% July 24, 2023  Echocardiogram July 20 1423 normal LV function mild LVH, bright thickened echogenic area mobile on RV lead most likely thrombus  VF ICD shock ICD  x 3 Most recent 7/11/2023   New onset A. fib: Documented by interrogation today August 2021 episode lasted 13  hours.  Hypertension  Hyperlipidemia  BPH, recent cystoscope and recent scope with Laser for prostate. Dr. Ponce  Diabetes mellitus type 2  Obstructive sleep apnea, Compliance with CPAP    Past Medical History, Past Surgical History, Family history, Social History, and Medications were all reviewed with the patient today and updated as necessary.       Current Outpatient Medications:     amLODIPine (NORVASC) 10 MG tablet, Take 1 tablet by mouth Daily., Disp: , Rfl:     atorvastatin (LIPITOR) 40 MG tablet, TAKE 1 TABLET BY MOUTH AT BEDTIME, Disp: 90 tablet, Rfl: 0    carvedilol (COREG) 25 MG tablet, TAKE 1 TABLET BY MOUTH 2 TIMES A DAY WITH MEALS, Disp: 60 tablet, Rfl: 0    clopidogrel (PLAVIX) 75 MG tablet, Take 1 tablet by mouth Daily., Disp: 90 tablet, Rfl: 3    coenzyme Q10 100 MG capsule, Take 1 capsule by mouth Daily., Disp: , Rfl:     Eliquis 5 MG tablet tablet, TAKE 1 TABLET BY MOUTH EVERY 12 HOURS, Disp: 60 tablet, Rfl: 0    furosemide (LASIX) 20 MG tablet, Take 1 tablet by mouth As Needed., Disp: , Rfl:     glimepiride (AMARYL) 4 MG tablet, TAKE 1 TABLET BY MOUTH 2 TIMES A DAY, Disp: 180 tablet, Rfl: 0    glucose blood (OneTouch Verio) test strip, USE TO CHECK BLOOD GLUCOSE ONCE DAILY OR AS DIRECTED, Disp: 100 each, Rfl: 0    glucose monitor monitoring kit, 1 each As Needed (for diabetes)., Disp: 1 each, Rfl: 0    hydrALAZINE (APRESOLINE) 50 MG tablet, Take 1 tablet by mouth 2 (Two) Times a Day., Disp: 60 tablet, Rfl: 11    hydrOXYzine (ATARAX) 25 MG tablet, Take 1 tablet by mouth At Night As Needed (insomnia)., Disp: 30 tablet, Rfl: 2    Lancets (onetouch ultrasoft) lancets, Check daily and prn, Disp: 100 each, Rfl: 12    metFORMIN (GLUCOPHAGE) 850 MG tablet, TAKE 1/2 TABLET BY MOUTH 2 TIMES A DAY., Disp: 90 tablet, Rfl: 0    Multiple Vitamin (MULTI-VITAMIN DAILY PO), Take 1 tablet by mouth Daily., Disp: , Rfl:     nitroglycerin (NITROSTAT) 0.3 MG SL tablet, Place 1 tablet under the tongue Every 5 (Five)  "Minutes As Needed for Chest Pain. Take no more than 3 doses in 15 minutes., Disp: 30 tablet, Rfl: 1    ondansetron ODT (ZOFRAN-ODT) 4 MG disintegrating tablet, Place 1 tablet on the tongue Every 8 (Eight) Hours As Needed., Disp: , Rfl:     Potassium Gluconate 2 MEQ tablet, Take 2 mEq by mouth Daily., Disp: , Rfl:     Semaglutide,0.25 or 0.5MG/DOS, (Ozempic, 0.25 or 0.5 MG/DOSE,) 2 MG/3ML solution pen-injector, Inject 0.5 mg under the skin into the appropriate area as directed 1 (One) Time Per Week., Disp: 3 mL, Rfl: 2    sertraline (ZOLOFT) 25 MG tablet, TAKE 1 TABLET BY MOUTH EVERY DAY, Disp: 30 tablet, Rfl: 0    spironolactone (ALDACTONE) 25 MG tablet, Take 0.5 tablets by mouth Daily. (Patient taking differently: Take 1 tablet by mouth Daily.), Disp: , Rfl:     tamsulosin (FLOMAX) 0.4 MG capsule 24 hr capsule, Take 1 capsule by mouth Daily., Disp: 90 capsule, Rfl: 2    valsartan (DIOVAN) 320 MG tablet, TAKE 1 TABLET BY MOUTH EVERY DAY, Disp: 90 tablet, Rfl: 0      Allergies   Allergen Reactions    Cyclobenzaprine Hives    Floxacillin (Flucloxacillin) Hives         PHYSICAL EXAM:    /60 (BP Location: Left arm, Patient Position: Sitting)   Pulse 57   Ht 185.4 cm (73\")   Wt 119 kg (262 lb 6.4 oz)   SpO2 98%   BMI 34.62 kg/m²        Wt Readings from Last 5 Encounters:   01/30/24 119 kg (262 lb 6.4 oz)   11/22/23 120 kg (263 lb 9.6 oz)   11/02/23 120 kg (264 lb)   09/21/23 118 kg (260 lb)   08/03/23 116 kg (256 lb 12.8 oz)       BP Readings from Last 5 Encounters:   01/30/24 104/60   11/22/23 122/80   11/02/23 122/70   09/21/23 132/82   08/03/23 128/78       General appearance - Alert, well appearing, and in no distress   Mental status - Affect appropriate to mood.  Eyes - Sclerae anicteric,  ENMT - Hearing grossly normal bilaterally, Dental hygiene good.  Neck - Carotids upstroke normal bilaterally, no bruits, no JVD.  Resp - Clear to auscultation, no wheezes, rales or rhonchi, symmetric air entry.  Heart - " Normal rate, regular rhythm, normal S1, S2, no murmurs, rubs, clicks or gallops.  GI - Soft, nontender, nondistended, no masses or organomegaly.  Neurological - Grossly intact - normal speech, no focal findings  Musculoskeletal - No joint tenderness, deformity or swelling, no muscular tenderness noted.  Extremities - Peripheral pulses normal, no pedal edema, no clubbing or cyanosis.  Skin - Normal coloration and turgor.  Psych -  oriented to person, place, and time.    Medical problems and test results were reviewed with the patient today.       A/P  1. Biventricular implantable cardioverter-defibrillator (ICD) in situ  He has history of Medtronic biventricular ICD.  He did have an insulation breach in his original RV lead and required lead revision in April 2023.  ICD was interrogated and is functioning well.  He is approximately 6.5 years of battery life remaining.  He is pacing 91% of the time in the atrium and 99% of the time in the ventricles.  Pacing and sensing thresholds are all within normal limits.  He had 3 short nonsustained VT events.  No changes were made to settings today.    2. VT (ventricular tachycardia)  Patient is a history of VT/VF.  Had several ICD shocks in June 2023.  He ended up having near critical ISR of his RCA stent which was thought to be the likely culprit of these episodes.  Has not had recurrence since.    3. Paroxysmal atrial fibrillation  History of paroxysmal atrial fibrillation.  Remains on Eliquis.  No episodes recently.    4. Coronary artery disease involving native coronary artery of native heart with angina pectoris  He has history of coronary disease and followed by Dr. Li.  As above recent RCA stent.  Remains on triple therapy.  Will discuss with Dr. Li if we can de-escalate this.          1/30/2024  09:21 EST    Electronically signed by KRISTEN Valdivia, 07/27/23, 3:31 PM EDT.

## 2024-01-31 RX ORDER — APIXABAN 5 MG/1
TABLET, FILM COATED ORAL
Qty: 60 TABLET | Refills: 3 | Status: SHIPPED | OUTPATIENT
Start: 2024-01-31

## 2024-01-31 RX ORDER — CARVEDILOL 25 MG/1
25 TABLET ORAL 2 TIMES DAILY WITH MEALS
Qty: 60 TABLET | Refills: 0 | Status: SHIPPED | OUTPATIENT
Start: 2024-01-31

## 2024-02-09 PROCEDURE — 88305 TISSUE EXAM BY PATHOLOGIST: CPT

## 2024-02-12 ENCOUNTER — LAB REQUISITION (OUTPATIENT)
Dept: LAB | Facility: HOSPITAL | Age: 64
End: 2024-02-12
Payer: MEDICARE

## 2024-02-12 DIAGNOSIS — K66.8 OTHER SPECIFIED DISORDERS OF PERITONEUM: ICD-10-CM

## 2024-02-13 DIAGNOSIS — E11.9 TYPE 2 DIABETES MELLITUS TREATED WITHOUT INSULIN: ICD-10-CM

## 2024-02-13 DIAGNOSIS — Z76.0 MEDICATION REFILL: ICD-10-CM

## 2024-02-13 LAB — REF LAB TEST METHOD: NORMAL

## 2024-02-14 RX ORDER — GLIMEPIRIDE 4 MG/1
4 TABLET ORAL 2 TIMES DAILY
Qty: 180 TABLET | Refills: 0 | Status: SHIPPED | OUTPATIENT
Start: 2024-02-14

## 2024-02-20 DIAGNOSIS — E11.65 TYPE 2 DIABETES MELLITUS WITH HYPERGLYCEMIA, WITHOUT LONG-TERM CURRENT USE OF INSULIN: Chronic | ICD-10-CM

## 2024-02-20 RX ORDER — SEMAGLUTIDE 0.68 MG/ML
0.5 INJECTION, SOLUTION SUBCUTANEOUS WEEKLY
Qty: 3 ML | Refills: 0 | Status: SHIPPED | OUTPATIENT
Start: 2024-02-20

## 2024-02-22 NOTE — PROGRESS NOTES
Sleep Clinic Video Visit Follow Up Note    The patient is located at their home address in [place], [place]. The patient presents today for telehealth service.  This service was conducted via audio/video technology through a secure [id number] video visit connection through Epic.  This provider is located in [facility], located within [facility].  Patient stated they are in a secure environment for the session.  Patient's condition being diagnosed/treated is appropriate for telemedicine.  The provider identified himself as well as his credentials.  The patient, and/or patient's guardian, consent to be seen remotely, and when consent is given they understanding that the consent allows for patient identifiable information to be sent to a third-party as needed.  They may refuse to be seen remotely at any time.  The electronic data is encrypted and password protected, and the patient and/or guardian has been advised of the potential risk to privacy not withstanding such measures.  Patient identifiers used: Name and date of birth.     You have chosen to receive care through a telehealth visit.  Do you consent to use a video connection for your medical care today? Yes       Chief Complaint  Follow-up and compliance of PAP therapy    Subjective     History of Present Illness (from previous encounter on 2/28/2023):  Omar Can is a 62 y.o. male who returns for follow-up compliance of PAP therapy.  Pap report has been reviewed.  Patient is fully compliant with greater than 4-hour usage at 94%.  He averages 9 hours on nights used.  AHI is well-controlled at 3.1/H. I will refill the patient's supplies, and they will return for follow-up and compliance in 1 year or sooner should they have further questions or concerns. (End copied text)    Interval History:  Omar Can is a 63 y.o. male returns for follow up and compliance of PAP therapy. The patient was last seen on 2/28/2023. Overall the patient feels good with regard to therapy.  The device appears to be working appropriately. On average the patient sleeps 8 hours per night. The patient wake 1 time per night.     The patient reports the following changes to their medical and medication history since they were last seen:  Heart attack X 3.  Pacemaker placement        Further details are as follows:    [place] Scale is: 3/24      Weight:  Current Weight: 260 lb    The patient's relevant past medical, surgical, family, and social history reviewed and updated in Epic as appropriate.    PMH:    Past Medical History:   Diagnosis Date    Arthritis     Benign prostatic hyperplasia     CHF (congestive heart failure)     Colon polyps     Coronary artery disease     Coronary stent patent     x 5 stents     Diabetes mellitus     Elevated cholesterol     Hypertension     Myocardial infarction 2010    cardiac arrest    Obesity     Paroxysmal atrial fibrillation 01/17/2023    Prostate hypertrophy     Sleep apnea     cpap nightly    Stage 3 chronic kidney disease 08/19/2020     Past Surgical History:   Procedure Laterality Date    CARDIAC CATHETERIZATION      CARDIAC CATHETERIZATION N/A 7/28/2023    Procedure: Left Heart Cath;  Surgeon: Rich Li MD;  Location:  [facility];  Service: Cardiovascular;  Laterality: N/A;    CARDIAC CATHETERIZATION N/A 7/28/2023    Procedure: Stent WILLEM coronary;  Surgeon: Rich Li MD;  Location:  [facility];  Service: Cardiovascular;  Laterality: N/A;    CARDIAC CATHETERIZATION N/A 7/28/2023    Procedure: Optical Coherence Tomography;  Surgeon: Rich Li MD;  Location:  [facility];  Service: Cardiovascular;  Laterality: N/A;    CARDIAC DEFIBRILLATOR PLACEMENT  03/2010    Implanted after cardiac arrest per patient    CARDIAC ELECTROPHYSIOLOGY PROCEDURE N/A 4/26/2023    Procedure: Lead Revision- new RV lead- has Medtronic device- holding Eliquis as of 4/24/23;  Surgeon: Brayan Epps MD;  Location:   [facility];  Service: Cardiology;  Laterality: N/A;    COLONOSCOPY      CORONARY STENT PLACEMENT      5 stents placed at one time    CYSTOSCOPY      CYSTOSCOPY TRANSURETHRAL RESECTION OF PROSTATE N/A 03/02/2022    Procedure: GREENLIGHT LASER VAPORIZATION OF PROSTATE;  Surgeon: Todd Ponce MD;  Location:  [facility];  Service: Urology;  Laterality: N/A;    CYSTOSCOPY TRANSURETHRAL RESECTION OF PROSTATE N/A 12/07/2022    Procedure: TRANSURETHRAL RESECTION OF PROSTATE WITH GREENLIGHT LASER;  Surgeon: Todd Ponce MD;  Location:  [facility];  Service: Urology;  Laterality: N/A;    INSERT / REPLACE / REMOVE PACEMAKER  03/2010    JOINT REPLACEMENT  2018    KNEE ARTHROSCOPY Right     OTHER SURGICAL HISTORY      defibulator with pacemaker --Medtronic, Cardiologist Dr. Сергей Lees [facility]    PACEMAKER IMPLANTATION  05/29/2019    battery change per patient    PROSTATE SURGERY  2022    in march and dec    REPLACEMENT TOTAL KNEE Left 11/17/2018    VASECTOMY  1985    VESICOSTOMY      WISDOM TOOTH EXTRACTION      3 out 4 removed.        Allergies   Allergen Reactions    Cyclobenzaprine Hives    Floxacillin (Flucloxacillin) Hives       MEDS:  Prior to Admission medications    Medication Sig Start Date End Date Taking? Authorizing Provider   amLODIPine (NORVASC) 10 MG tablet Take 1 tablet by mouth Daily. 2/1/22   Provider, MD Talat   apixaban (Eliquis) 5 MG tablet tablet TAKE 1 TABLET BY MOUTH EVERY 12 HOURS 1/31/24   Eder Magana PA   atorvastatin (LIPITOR) 40 MG tablet TAKE 1 TABLET BY MOUTH AT BEDTIME 1/17/24   Diana Pardo APRN   carvedilol (COREG) 25 MG tablet TAKE 1 TABLET BY MOUTH 2 TIMES A DAY WITH MEALS 1/31/24   Diana Pardo APRN   clopidogrel (PLAVIX) 75 MG tablet Take 1 tablet by mouth Daily. 7/28/23   Rich Li MD   coenzyme Q10 100 MG capsule Take 1 capsule by mouth Daily.    Provider, MD Talat   furosemide (LASIX) 20 MG tablet Take 1 tablet by mouth As  Needed.    Talat Solis MD   glimepiride (AMARYL) 4 MG tablet TAKE 1 TABLET BY MOUTH 2 TIMES A DAY 2/14/24   Diana Pardo APRN   glucose blood (OneTouch Verio) test strip USE TO CHECK BLOOD GLUCOSE ONCE DAILY OR AS DIRECTED 7/26/23   Diana Pardo APRN   glucose monitor monitoring kit 1 each As Needed (for diabetes). 1/3/20   Cristina Davis APRN   hydrALAZINE (APRESOLINE) 50 MG tablet Take 1 tablet by mouth 2 (Two) Times a Day. 9/24/23   Diana Pardo APRN   hydrOXYzine (ATARAX) 25 MG tablet Take 1 tablet by mouth At Night As Needed (insomnia). 9/27/23   Diana Pardo APRN   Lancets (onetouch ultrasoft) lancets Check daily and prn 7/26/23   Cristina Davis APRN   metFORMIN (GLUCOPHAGE) 850 MG tablet TAKE 1/2 TABLET BY MOUTH 2 TIMES A DAY. 1/3/24   Diana Pardo APRN   Multiple Vitamin (MULTI-VITAMIN DAILY PO) Take 1 tablet by mouth Daily.    Talat Solis MD   nitroglycerin (NITROSTAT) 0.3 MG SL tablet Place 1 tablet under the tongue Every 5 (Five) Minutes As Needed for Chest Pain. Take no more than 3 doses in 15 minutes. 1/17/23   Eder Magana PA   ondansetron ODT (ZOFRAN-ODT) 4 MG disintegrating tablet Place 1 tablet on the tongue Every 8 (Eight) Hours As Needed.    ProviderTalat MD   Ozempic, 0.25 or 0.5 MG/DOSE, 2 MG/3ML solution pen-injector Inject 0.5 mg under the skin into the appropriate area as directed 1 (One) Time Per Week. 2/20/24   Diana Pardo APRN   Potassium Gluconate 2 MEQ tablet Take 2 mEq by mouth Daily.    Talat Solis MD   sertraline (ZOLOFT) 25 MG tablet TAKE 1 TABLET BY MOUTH EVERY DAY 1/24/24   Diana Pardo APRN   spironolactone (ALDACTONE) 25 MG tablet Take 0.5 tablets by mouth Daily.  Patient taking differently: Take 1 tablet by mouth Daily. 11/2/23   Rich Li MD   tamsulosin (FLOMAX) 0.4 MG capsule 24 hr capsule Take 1 capsule by mouth Daily. 12/12/23   Diana Pardo APRN   valsartan (DIOVAN) 320 MG  "tablet TAKE 1 TABLET BY MOUTH EVERY DAY 12/11/23   Diana Pardo APRN         FH:  Family History   Problem Relation Age of Onset    Hypertension Mother     Heart failure Mother     Stroke Mother     Diabetes Mother     Heart disease Mother     Heart attack Father     Diabetes Father     Heart disease Father     Vision loss Father     No Known Problems Sister     No Known Problems Sister     Leukemia Sister     Cerebral palsy Brother     Hearing loss Brother     No Known Problems Brother     No Known Problems Brother     No Known Problems Brother     No Known Problems Daughter     Diabetes Son        Objective   Vital Signs:  Ht 185.4 cm (72.99\")   Wt 118 kg (260 lb)   BMI 34.31 kg/m²     Patient's (Body mass index is 34.31 kg/m².) indicates that they are obese (BMI >30) with health related conditions that include obstructive sleep apnea . Weight is improving with treatment. BMI is is above average; BMI management plan is completed. We discussed portion control and increasing exercise.            Physical Exam  Constitutional:       Appearance: Normal appearance.   Neurological:      Mental Status: He is alert and oriented to person, place, and time.   Psychiatric:         Mood and Affect: Mood normal.         Behavior: Behavior normal.         Thought Content: Thought content normal.         Judgment: Judgment normal.               Result Review :           PAP Report:  AHI: 1.7/h  Days of Usage: 90/90 (100%)  95th percentile leaks: 19.2 L/min  Number of Days Greater than 4 hours: 89/90 (99%)  Settings: CPAP-16 cm H2O, EPR full-time, EPR level 2       Assessment and Plan  Omar Can is a 63 y.o. male who returns for follow-up and compliance of PAP therapy.  The pap report has been reviewed.  Overall usage is at 100% with compliance at 99%.  The patient averages 8 hours and 46 minutes of therapy.  Sleep apnea is well-controlled with an AHI of 1.7/H. I will refill the patient's supplies, and they will " return for follow-up and compliance in 1 year or sooner should they have further questions or concerns.      Diagnoses and all orders for this visit:    1. Obstructive sleep apnea, adult (Primary)  -     PAP Therapy    2. Obesity (BMI 30.0-34.9)           The patient continues to use and benefit from PAP therapy.    1. The patient was counseled regarding multimodal approach with healthy nutrition, healthy sleep, regular physical activity, social activities, counseling, and medications. Encouraged to practice lateral sleep position. Avoid alcohol and sedatives close to bedtime.     2.  We will refill supplies x1 year.  Return to clinic 1 year or sooner if symptoms warrant.  Patient gave verbal consent today for video visit. I have reviewed the results of my evaluation and impression and discussed my recommendations in detail with the patient.         Follow Up  Return in about 1 year (around 2/27/2025) for Annual visit.  Patient was given instructions and counseling regarding his condition or for health maintenance advice. Please see specific information pulled into the AVS if appropriate.       BRIDGET Wells, ACNP-BC  Pulmonology, Critical Care, and Sleep Medicine

## 2024-02-27 ENCOUNTER — TELEMEDICINE (OUTPATIENT)
Dept: SLEEP MEDICINE | Facility: HOSPITAL | Age: 64
End: 2024-02-27
Payer: MEDICARE

## 2024-02-27 VITALS — BODY MASS INDEX: 34.46 KG/M2 | WEIGHT: 260 LBS | HEIGHT: 73 IN

## 2024-02-27 DIAGNOSIS — E66.9 OBESITY (BMI 30.0-34.9): ICD-10-CM

## 2024-02-27 DIAGNOSIS — G47.33 OBSTRUCTIVE SLEEP APNEA, ADULT: Primary | ICD-10-CM

## 2024-03-05 DIAGNOSIS — Z76.0 MEDICATION REFILL: ICD-10-CM

## 2024-03-05 DIAGNOSIS — F41.9 ANXIETY: ICD-10-CM

## 2024-03-05 DIAGNOSIS — I10 PRIMARY HYPERTENSION: ICD-10-CM

## 2024-03-06 NOTE — TELEPHONE ENCOUNTER
Attempted to contact patient, no answer. Left voicemail for patient to call the office back (office # given).     Hub may relay message and schedule appointment.    Pt is due for 3 month f/u.

## 2024-03-06 NOTE — TELEPHONE ENCOUNTER
"Name: Omar Can \"Don\"      Relationship: Self      Best Callback Number: [phone]       HUB PROVIDED THE RELAY MESSAGE FROM THE OFFICE      PATIENT: SCHEDULED PER NOTE    ADDITIONAL INFORMATION: APPOINTMENT SCHEDULED FOR 03/08/24    "

## 2024-03-06 NOTE — TELEPHONE ENCOUNTER
Rx Refill Note  Requested Prescriptions     Pending Prescriptions Disp Refills    carvedilol (COREG) 25 MG tablet [Pharmacy Med Name: Carvedilol Oral Tablet 25 MG] 60 tablet 0     Sig: TAKE 1 TABLET BY MOUTH 2 TIMES A DAY WITH MEALS    sertraline (ZOLOFT) 25 MG tablet [Pharmacy Med Name: Sertraline HCl Oral Tablet 25 MG] 30 tablet 0     Sig: TAKE 1 TABLET BY MOUTH EVERY DAY    valsartan (DIOVAN) 320 MG tablet [Pharmacy Med Name: Valsartan Oral Tablet 320 MG] 90 tablet 0     Sig: TAKE 1 TABLET BY MOUTH EVERY DAY      Last office visit with prescribing clinician: 11/22/2023   Last telemedicine visit with prescribing clinician: Visit date not found   Next office visit with prescribing clinician: Visit date not found   Ruth Manzano MA  03/06/24, 09:31 EST  Last fill: 01/31/2024  Per LOV note pt was to return around 2/22/2024 for  3 mo f/u

## 2024-03-06 NOTE — TELEPHONE ENCOUNTER
Rx Refill Note  Requested Prescriptions     Pending Prescriptions Disp Refills    carvedilol (COREG) 25 MG tablet [Pharmacy Med Name: Carvedilol Oral Tablet 25 MG] 60 tablet 0     Sig: TAKE 1 TABLET BY MOUTH 2 TIMES A DAY WITH MEALS    sertraline (ZOLOFT) 25 MG tablet [Pharmacy Med Name: Sertraline HCl Oral Tablet 25 MG] 30 tablet 0     Sig: TAKE 1 TABLET BY MOUTH EVERY DAY    valsartan (DIOVAN) 320 MG tablet [Pharmacy Med Name: Valsartan Oral Tablet 320 MG] 90 tablet 0     Sig: TAKE 1 TABLET BY MOUTH EVERY DAY      Last office visit with prescribing clinician: 11/22/2023   Last telemedicine visit with prescribing clinician: Visit date not found   Next office visit with prescribing clinician: 3/8/2024                         Would you like a call back once the refill request has been completed: [] Yes [] No    If the office needs to give you a call back, can they leave a voicemail: [] Yes [] No    Yenny Lorenzo MA  03/06/24, 13:31 EST

## 2024-03-07 RX ORDER — CARVEDILOL 25 MG/1
25 TABLET ORAL 2 TIMES DAILY WITH MEALS
Qty: 180 TABLET | Refills: 0 | Status: SHIPPED | OUTPATIENT
Start: 2024-03-07

## 2024-03-07 RX ORDER — VALSARTAN 320 MG/1
320 TABLET ORAL DAILY
Qty: 90 TABLET | Refills: 0 | Status: SHIPPED | OUTPATIENT
Start: 2024-03-07

## 2024-03-07 RX ORDER — SERTRALINE HYDROCHLORIDE 25 MG/1
25 TABLET, FILM COATED ORAL DAILY
Qty: 90 TABLET | Refills: 0 | Status: SHIPPED | OUTPATIENT
Start: 2024-03-07

## 2024-03-08 ENCOUNTER — OFFICE VISIT (OUTPATIENT)
Dept: FAMILY MEDICINE CLINIC | Facility: CLINIC | Age: 64
End: 2024-03-08
Payer: MEDICARE

## 2024-03-08 VITALS
DIASTOLIC BLOOD PRESSURE: 72 MMHG | TEMPERATURE: 98.6 F | WEIGHT: 260.4 LBS | SYSTOLIC BLOOD PRESSURE: 114 MMHG | HEART RATE: 70 BPM | OXYGEN SATURATION: 94 % | HEIGHT: 73 IN | BODY MASS INDEX: 34.51 KG/M2

## 2024-03-08 DIAGNOSIS — E11.65 TYPE 2 DIABETES MELLITUS WITH HYPERGLYCEMIA, WITHOUT LONG-TERM CURRENT USE OF INSULIN: Primary | ICD-10-CM

## 2024-03-08 LAB
EXPIRATION DATE: ABNORMAL
HBA1C MFR BLD: 7.6 % (ref 4.5–5.7)
Lab: ABNORMAL

## 2024-03-08 RX ORDER — SEMAGLUTIDE 0.68 MG/ML
0.5 INJECTION, SOLUTION SUBCUTANEOUS WEEKLY
Qty: 3 ML | Refills: 2 | Status: SHIPPED | OUTPATIENT
Start: 2024-03-08

## 2024-03-08 NOTE — PROGRESS NOTES
Follow Up Office Note   Patient Name: Omar Can  : 1960   MRN: [id number]     Chief Complaint:    Chief Complaint   Patient presents with    Diabetes       History of Present Illness:   Omar Can is a 63 y.o. male who presents today for re-evaluation/management chronic DM2.  Patient's diabetes has been improving with Ozempic, however has remained above goal of less than 7.  Patient's last A1c was 7.7.  Patient reports that he has been tolerating the Ozempic well without any side effects or adverse reactions.  He reports medication compliance.      Subjective   I have reviewed and the following portions of the patient's history were updated as appropriate: past family history, past medical history, past social history, past surgical history and problem list.    Review of Systems:   Review of Systems   Constitutional: Negative.    Respiratory: Negative.     Cardiovascular: Negative.    Endocrine: Negative for polydipsia, polyphagia and polyuria.        Past Medical History:   Past Medical History:   Diagnosis Date    Arthritis     Benign prostatic hyperplasia     CHF (congestive heart failure)     Colon polyps     Coronary artery disease     Coronary stent patent     x 5 stents     Diabetes mellitus     Elevated cholesterol     Hypertension     Myocardial infarction     cardiac arrest    Obesity     Paroxysmal atrial fibrillation 2023    Prostate hypertrophy     Sleep apnea     cpap nightly    Stage 3 chronic kidney disease 2020       Medications:     Current Outpatient Medications:     amLODIPine (NORVASC) 10 MG tablet, Take 1 tablet by mouth Daily., Disp: , Rfl:     apixaban (Eliquis) 5 MG tablet tablet, TAKE 1 TABLET BY MOUTH EVERY 12 HOURS, Disp: 60 tablet, Rfl: 3    atorvastatin (LIPITOR) 40 MG tablet, TAKE 1 TABLET BY MOUTH AT BEDTIME, Disp: 90 tablet, Rfl: 0    carvedilol (COREG) 25 MG tablet, TAKE 1 TABLET BY MOUTH 2 TIMES A DAY WITH MEALS, Disp: 180 tablet, Rfl:  0    clopidogrel (PLAVIX) 75 MG tablet, Take 1 tablet by mouth Daily., Disp: 90 tablet, Rfl: 3    coenzyme Q10 100 MG capsule, Take 1 capsule by mouth Daily., Disp: , Rfl:     furosemide (LASIX) 20 MG tablet, Take 1 tablet by mouth As Needed., Disp: , Rfl:     glimepiride (AMARYL) 4 MG tablet, TAKE 1 TABLET BY MOUTH 2 TIMES A DAY, Disp: 180 tablet, Rfl: 0    glucose blood (OneTouch Verio) test strip, USE TO CHECK BLOOD GLUCOSE ONCE DAILY OR AS DIRECTED, Disp: 100 each, Rfl: 0    glucose monitor monitoring kit, 1 each As Needed (for diabetes)., Disp: 1 each, Rfl: 0    hydrALAZINE (APRESOLINE) 50 MG tablet, Take 1 tablet by mouth 2 (Two) Times a Day., Disp: 60 tablet, Rfl: 11    hydrOXYzine (ATARAX) 25 MG tablet, Take 1 tablet by mouth At Night As Needed (insomnia)., Disp: 30 tablet, Rfl: 2    Lancets (onetouch ultrasoft) lancets, Check daily and prn, Disp: 100 each, Rfl: 12    metFORMIN (GLUCOPHAGE) 850 MG tablet, TAKE 1/2 TABLET BY MOUTH 2 TIMES A DAY., Disp: 90 tablet, Rfl: 0    Multiple Vitamin (MULTI-VITAMIN DAILY PO), Take 1 tablet by mouth Daily., Disp: , Rfl:     nitroglycerin (NITROSTAT) 0.3 MG SL tablet, Place 1 tablet under the tongue Every 5 (Five) Minutes As Needed for Chest Pain. Take no more than 3 doses in 15 minutes., Disp: 30 tablet, Rfl: 1    ondansetron ODT (ZOFRAN-ODT) 4 MG disintegrating tablet, Place 1 tablet on the tongue Every 8 (Eight) Hours As Needed., Disp: , Rfl:     Potassium Gluconate 2 MEQ tablet, Take 2 mEq by mouth Daily., Disp: , Rfl:     Semaglutide,0.25 or 0.5MG/DOS, (Ozempic, 0.25 or 0.5 MG/DOSE,) 2 MG/3ML solution pen-injector, Inject 0.5 mg under the skin into the appropriate area as directed 1 (One) Time Per Week., Disp: 3 mL, Rfl: 2    sertraline (ZOLOFT) 25 MG tablet, TAKE 1 TABLET BY MOUTH EVERY DAY, Disp: 90 tablet, Rfl: 0    spironolactone (ALDACTONE) 25 MG tablet, Take 0.5 tablets by mouth Daily. (Patient taking differently: Take 1 tablet by mouth Daily.), Disp: , Rfl:     " tamsulosin (FLOMAX) 0.4 MG capsule 24 hr capsule, Take 1 capsule by mouth Daily., Disp: 90 capsule, Rfl: 2    valsartan (DIOVAN) 320 MG tablet, TAKE 1 TABLET BY MOUTH EVERY DAY, Disp: 90 tablet, Rfl: 0    Allergies:   Allergies   Allergen Reactions    Cyclobenzaprine Hives    Floxacillin (Flucloxacillin) Hives         Objective   Physical Exam:  Vital Signs:   Vitals:    03/08/24 0747   BP: 114/72   Pulse: 70   Temp: 98.6 °F (37 °C)   SpO2: 94%   Weight: 118 kg (260 lb 6.4 oz)   Height: 185.4 cm (72.99\")     Body mass index is 34.37 kg/m².     Physical Exam  Vitals and nursing note reviewed.   Constitutional:       General: He is not in acute distress.     Appearance: Normal appearance. He is well-developed. He is not ill-appearing, toxic-appearing or diaphoretic.   HENT:      Head: Normocephalic and atraumatic.   Cardiovascular:      Rate and Rhythm: Normal rate and regular rhythm.   Pulmonary:      Effort: Pulmonary effort is normal. No respiratory distress.      Breath sounds: Normal breath sounds. No stridor. No wheezing.   Skin:     General: Skin is warm and dry.   Neurological:      General: No focal deficit present.      Mental Status: He is alert and oriented to person, place, and time.   Psychiatric:         Mood and Affect: Mood normal.         Behavior: Behavior normal. Behavior is cooperative.         Thought Content: Thought content normal.         Judgment: Judgment normal.         Assessment / Plan    Assessment/Plan:   Diagnoses and all orders for this visit:    1. Type 2 diabetes mellitus with hyperglycemia, without long-term current use of insulin (Primary)  Assessment & Plan:  Diabetes is improving with treatment, however remains above goal of less than 7. A1C today 7.6   We discussed increasing Ozempic to 1 mg/week dosage, however patient wants to wait 3 months and try lifestyle changes first. He has had some nausea in the past with medication.  Recommend ADA diet, routine aerobic exercise, " weight loss, close glucose monitoring.    Diabetes will be reassessed in 3 months    Orders:  -     POC Glycosylated Hemoglobin (Hb A1C)  -     Semaglutide,0.25 or 0.5MG/DOS, (Ozempic, 0.25 or 0.5 MG/DOSE,) 2 MG/3ML solution pen-injector; Inject 0.5 mg under the skin into the appropriate area as directed 1 (One) Time Per Week.  Dispense: 3 mL; Refill: 2             Discussed the nature of the medical condition(s) risks, complications, implications, management, safe and proper use of medications. Encouraged medication compliance, and keeping scheduled follow up appointments with me and any other providers.      RTC if symptoms fail to improve, to ER if symptoms worsen.      *Dictated Utilizing Dragon Dictation   Please note that portions of this note were completed with a voice recognition program.   Part of this note may be an electronic transcription/translation of spoken language to printed text using the Dragon Dictation System. Spelling and/or grammatical errors may exist despite efforts at proofreading.      NOTE TO PATIENT: The 21st Century Cures Act makes medical notes like these available to patients in the interest of transparency. However, be advised this is a medical document. It is intended as peer to peer communication. It is written in medical language and may contain abbreviations or verbiage that are unfamiliar. It may appear blunt or direct. Medical documents are intended to carry relevant information, facts as evident, and the clinical opinion of the practitioner.      BRIDGET Carter  [facility] [place]

## 2024-03-08 NOTE — ASSESSMENT & PLAN NOTE
Diabetes is improving with treatment, however remains above goal of less than 7. A1C today 7.6   We discussed increasing Ozempic to 1 mg/week dosage, however patient wants to wait 3 months and try lifestyle changes first. He has had some nausea in the past with medication.  Recommend ADA diet, routine aerobic exercise, weight loss, close glucose monitoring.    Diabetes will be reassessed in 3 months

## 2024-03-25 ENCOUNTER — TRANSCRIBE ORDERS (OUTPATIENT)
Dept: ADMINISTRATIVE | Facility: HOSPITAL | Age: 64
End: 2024-03-25
Payer: MEDICARE

## 2024-03-25 DIAGNOSIS — K66.8 CALCIFIED MESENTERIC MASS: Primary | ICD-10-CM

## 2024-04-01 ENCOUNTER — HOSPITAL ENCOUNTER (OUTPATIENT)
Dept: CT IMAGING | Facility: HOSPITAL | Age: 64
Discharge: HOME | End: 2024-04-01
Admitting: SURGERY
Payer: MEDICARE

## 2024-04-01 DIAGNOSIS — K66.8 CALCIFIED MESENTERIC MASS: ICD-10-CM

## 2024-04-01 LAB — CREAT BLDA-MCNC: 1.1 MG/DL (ref 0.6–1.3)

## 2024-04-01 PROCEDURE — 74177 CT ABD & PELVIS W/CONTRAST: CPT

## 2024-04-01 PROCEDURE — 25510000001 IOPAMIDOL 61 % SOLUTION: Performed by: SURGERY

## 2024-04-01 PROCEDURE — 82565 ASSAY OF CREATININE: CPT

## 2024-04-01 RX ADMIN — IOPAMIDOL 95 ML: 612 INJECTION, SOLUTION INTRAVENOUS at 13:45

## 2024-04-02 DIAGNOSIS — Z76.0 MEDICATION REFILL: ICD-10-CM

## 2024-04-02 DIAGNOSIS — G47.00 INSOMNIA, UNSPECIFIED TYPE: ICD-10-CM

## 2024-04-02 DIAGNOSIS — E11.9 TYPE 2 DIABETES MELLITUS TREATED WITHOUT INSULIN: ICD-10-CM

## 2024-04-03 RX ORDER — HYDROXYZINE HYDROCHLORIDE 25 MG/1
25 TABLET, FILM COATED ORAL NIGHTLY PRN
Qty: 30 TABLET | Refills: 2 | Status: SHIPPED | OUTPATIENT
Start: 2024-04-03

## 2024-04-17 DIAGNOSIS — E78.5 HYPERLIPIDEMIA, UNSPECIFIED HYPERLIPIDEMIA TYPE: ICD-10-CM

## 2024-04-17 DIAGNOSIS — Z76.0 MEDICATION REFILL: ICD-10-CM

## 2024-04-17 RX ORDER — ATORVASTATIN CALCIUM 40 MG/1
40 TABLET, FILM COATED ORAL
Qty: 90 TABLET | Refills: 0 | Status: SHIPPED | OUTPATIENT
Start: 2024-04-17

## 2024-04-22 DIAGNOSIS — E11.65 TYPE 2 DIABETES MELLITUS WITH HYPERGLYCEMIA, WITHOUT LONG-TERM CURRENT USE OF INSULIN: ICD-10-CM

## 2024-04-22 RX ORDER — SEMAGLUTIDE 0.68 MG/ML
0.5 INJECTION, SOLUTION SUBCUTANEOUS WEEKLY
Qty: 3 ML | Refills: 2 | Status: SHIPPED | OUTPATIENT
Start: 2024-04-22

## 2024-05-14 DIAGNOSIS — E11.9 TYPE 2 DIABETES MELLITUS TREATED WITHOUT INSULIN: ICD-10-CM

## 2024-05-14 DIAGNOSIS — Z76.0 MEDICATION REFILL: ICD-10-CM

## 2024-05-15 RX ORDER — GLIMEPIRIDE 4 MG/1
4 TABLET ORAL 2 TIMES DAILY
Qty: 180 TABLET | Refills: 0 | Status: SHIPPED | OUTPATIENT
Start: 2024-05-15

## 2024-05-28 DIAGNOSIS — F41.9 ANXIETY: ICD-10-CM

## 2024-05-29 RX ORDER — SERTRALINE HYDROCHLORIDE 25 MG/1
25 TABLET, FILM COATED ORAL DAILY
Qty: 90 TABLET | Refills: 0 | Status: SHIPPED | OUTPATIENT
Start: 2024-05-29

## 2024-06-04 DIAGNOSIS — I10 PRIMARY HYPERTENSION: ICD-10-CM

## 2024-06-04 DIAGNOSIS — Z76.0 MEDICATION REFILL: ICD-10-CM

## 2024-06-05 RX ORDER — APIXABAN 5 MG/1
TABLET, FILM COATED ORAL
Qty: 180 TABLET | Refills: 3 | Status: SHIPPED | OUTPATIENT
Start: 2024-06-05

## 2024-06-05 RX ORDER — VALSARTAN 320 MG/1
320 TABLET ORAL DAILY
Qty: 90 TABLET | Refills: 0 | Status: SHIPPED | OUTPATIENT
Start: 2024-06-05

## 2024-06-05 RX ORDER — CARVEDILOL 25 MG/1
25 TABLET ORAL 2 TIMES DAILY WITH MEALS
Qty: 180 TABLET | Refills: 0 | Status: SHIPPED | OUTPATIENT
Start: 2024-06-05

## 2024-06-11 ENCOUNTER — TRANSCRIBE ORDERS (OUTPATIENT)
Dept: ADMINISTRATIVE | Facility: HOSPITAL | Age: 64
End: 2024-06-11
Payer: MEDICARE

## 2024-06-11 DIAGNOSIS — K66.8 OTHER SPECIFIED DISORDERS OF PERITONEUM: Primary | ICD-10-CM

## 2024-07-03 DIAGNOSIS — E11.9 TYPE 2 DIABETES MELLITUS TREATED WITHOUT INSULIN: ICD-10-CM

## 2024-07-03 DIAGNOSIS — Z76.0 MEDICATION REFILL: ICD-10-CM

## 2024-07-08 ENCOUNTER — HOSPITAL ENCOUNTER (OUTPATIENT)
Dept: CT IMAGING | Facility: HOSPITAL | Age: 64
Discharge: HOME | End: 2024-07-08
Admitting: SURGERY
Payer: MEDICARE

## 2024-07-08 DIAGNOSIS — K66.8 OTHER SPECIFIED DISORDERS OF PERITONEUM: ICD-10-CM

## 2024-07-08 LAB — CREAT BLDA-MCNC: 1.3 MG/DL (ref 0.6–1.3)

## 2024-07-08 PROCEDURE — 82565 ASSAY OF CREATININE: CPT

## 2024-07-08 PROCEDURE — 25510000001 IOPAMIDOL 61 % SOLUTION: Performed by: SURGERY

## 2024-07-08 PROCEDURE — 74177 CT ABD & PELVIS W/CONTRAST: CPT

## 2024-07-08 RX ORDER — NITROGLYCERIN 0.3 MG/1
TABLET SUBLINGUAL
Qty: 100 TABLET | Refills: 0 | Status: SHIPPED | OUTPATIENT
Start: 2024-07-08

## 2024-07-08 RX ADMIN — IOPAMIDOL 98 ML: 612 INJECTION, SOLUTION INTRAVENOUS at 14:06

## 2024-07-13 DIAGNOSIS — E78.5 HYPERLIPIDEMIA, UNSPECIFIED HYPERLIPIDEMIA TYPE: ICD-10-CM

## 2024-07-13 DIAGNOSIS — G47.00 INSOMNIA, UNSPECIFIED TYPE: ICD-10-CM

## 2024-07-13 DIAGNOSIS — Z76.0 MEDICATION REFILL: ICD-10-CM

## 2024-07-15 RX ORDER — CLOPIDOGREL BISULFATE 75 MG/1
75 TABLET ORAL DAILY
Qty: 90 TABLET | Refills: 3 | Status: SHIPPED | OUTPATIENT
Start: 2024-07-15

## 2024-07-15 RX ORDER — HYDROXYZINE HYDROCHLORIDE 25 MG/1
TABLET, FILM COATED ORAL
Qty: 30 TABLET | Refills: 0 | Status: SHIPPED | OUTPATIENT
Start: 2024-07-15

## 2024-07-15 RX ORDER — ATORVASTATIN CALCIUM 40 MG/1
40 TABLET, FILM COATED ORAL
Qty: 90 TABLET | Refills: 0 | Status: SHIPPED | OUTPATIENT
Start: 2024-07-15

## 2024-07-16 ENCOUNTER — OFFICE VISIT (OUTPATIENT)
Dept: CARDIOLOGY | Facility: CLINIC | Age: 64
End: 2024-07-16
Payer: MEDICARE

## 2024-07-16 VITALS
DIASTOLIC BLOOD PRESSURE: 78 MMHG | WEIGHT: 260.2 LBS | OXYGEN SATURATION: 96 % | HEART RATE: 69 BPM | BODY MASS INDEX: 34.48 KG/M2 | HEIGHT: 73 IN | SYSTOLIC BLOOD PRESSURE: 130 MMHG

## 2024-07-16 DIAGNOSIS — I25.5 ISCHEMIC CARDIOMYOPATHY: Primary | ICD-10-CM

## 2024-07-16 DIAGNOSIS — I47.20 VT (VENTRICULAR TACHYCARDIA): ICD-10-CM

## 2024-07-16 DIAGNOSIS — I48.0 PAROXYSMAL ATRIAL FIBRILLATION: ICD-10-CM

## 2024-07-16 DIAGNOSIS — E11.65 TYPE 2 DIABETES MELLITUS WITH HYPERGLYCEMIA, WITHOUT LONG-TERM CURRENT USE OF INSULIN: ICD-10-CM

## 2024-07-16 DIAGNOSIS — I10 PRIMARY HYPERTENSION: Chronic | ICD-10-CM

## 2024-07-16 DIAGNOSIS — Z95.810 BIVENTRICULAR IMPLANTABLE CARDIOVERTER-DEFIBRILLATOR (ICD) IN SITU: Chronic | ICD-10-CM

## 2024-07-16 RX ORDER — SEMAGLUTIDE 0.68 MG/ML
1 INJECTION, SOLUTION SUBCUTANEOUS WEEKLY
Start: 2024-07-16

## 2024-07-16 RX ORDER — SPIRONOLACTONE 25 MG/1
25 TABLET ORAL DAILY
Start: 2024-07-16 | End: 2024-07-20 | Stop reason: SDUPTHER

## 2024-07-16 NOTE — PROGRESS NOTES
Cardiac Electrophysiology Outpatient Note  [facility]    Office Visit     Omar Can  [id number]  07/16/2024    Primary Care Physician: Jose Raul Graham MD    Referred By: Diana Pardo APRN    Subjective     Chief Complaint   Patient presents with    Biventricular implantable cardioverter-defibrillator (ICD)     Coronary arteriosclerosis in native artery    Primary hypertension    Atrioventricular block, first degree     Cardiac PMH: (Old records have been reviewed and summarized below)  Ischemic heart disease  Cardiac arrest admission to [facility] March 26, 2010 with left heart catheterization by Dr. MIKE with emergent catheter-based intervention.  (Database insufficiency)  Repeat left heart catheterization 48 hours with catheter-based invention 2 stents other vessels (database insufficiency)  MDT BIVICD placed 2010 [facility] (database insufficiency ) Dr. Quiroga  Picreeltronic BIVIICD generator change November 7, 2019  Echocardiogram May 20, 2021 LVH EF 60% mild AI  Normal myocardial perfusion study May 20, 2021 EF calculated 42%.  RV Lead Revision 4/26/2023 Dr. Epps 6/28/2023  Myocardial perfusion imaging indicates ischemia inferior wall and inferolateral wall EF 50% July 24, 2023  Echocardiogram July 24, 2023 normal LV function mild LVH, bright thickened echogenic area mobile on RV lead most likely thrombus  [facility] 7/2023 by Dr. Li: Mid RCA 95% ISR s/p PCI with overlapping WILLEM, ostial circumflex 90% heavily calcified stenosis s/p intravascular lithotripsy and PCI with WILLEM overlapping x 2, widely patent LAD stents  VF ICD shock ICD  x 3 Most recent 7/11/2023   New onset A. fib: Documented by interrogation today August 2021 episode lasted 13 hours.  Hypertension  Hyperlipidemia  BPH, recent cystoscope and recent scope with Laser for prostate. Dr. Ponce  Diabetes mellitus type 2  Obstructive sleep apnea, Compliance with  CPAP    History of Present Illness:   Omar Can is a 64 y.o. male who presents to my electrophysiology clinic for follow up of ischemic cardiomyopathy and VT/VF s/p biventricular ICD, paroxysmal atrial fibrillation and hypertension.  His ICD shocked him for V-fib x 3 up to 7/2023.  He subsequently underwent left heart catheterization with interventions outlined above.  Since we last saw the patient in January, he has done very well from a cardiac standpoint.  He denies any chest pain, shortness of breath, palpitations, lightheadedness/dizziness, lower extremity edema, or syncopal episodes.    Past Medical History:   Diagnosis Date    Arthritis     Benign prostatic hyperplasia     CHF (congestive heart failure)     Colon polyps     Coronary artery disease     Coronary stent patent     x 5 stents     Diabetes mellitus     Elevated cholesterol     Hypertension     Myocardial infarction 2010    cardiac arrest    Obesity     Paroxysmal atrial fibrillation 01/17/2023    Prostate hypertrophy     Sleep apnea     cpap nightly    Stage 3 chronic kidney disease 08/19/2020       Past Surgical History:   Procedure Laterality Date    CARDIAC CATHETERIZATION      CARDIAC CATHETERIZATION N/A 7/28/2023    Procedure: Left Heart Cath;  Surgeon: Rich Li MD;  Location:  [facility];  Service: Cardiovascular;  Laterality: N/A;    CARDIAC CATHETERIZATION N/A 7/28/2023    Procedure: Stent WILLEM coronary;  Surgeon: Rich Li MD;  Location:  [facility];  Service: Cardiovascular;  Laterality: N/A;    CARDIAC CATHETERIZATION N/A 7/28/2023    Procedure: Optical Coherence Tomography;  Surgeon: Rich Li MD;  Location:  [facility];  Service: Cardiovascular;  Laterality: N/A;    CARDIAC DEFIBRILLATOR PLACEMENT  03/2010    Implanted after cardiac arrest per patient    CARDIAC ELECTROPHYSIOLOGY PROCEDURE N/A 4/26/2023    Procedure: Lead Revision- new RV lead- has  Medtronic device- holding Eliquis as of 4/24/23;  Surgeon: Brayan Epps MD;  Location:  [facility];  Service: Cardiology;  Laterality: N/A;    COLONOSCOPY      CORONARY STENT PLACEMENT      5 stents placed at one time    CYSTOSCOPY      CYSTOSCOPY TRANSURETHRAL RESECTION OF PROSTATE N/A 03/02/2022    Procedure: GREENLIGHT LASER VAPORIZATION OF PROSTATE;  Surgeon: Todd Ponce MD;  Location:  [facility];  Service: Urology;  Laterality: N/A;    CYSTOSCOPY TRANSURETHRAL RESECTION OF PROSTATE N/A 12/07/2022    Procedure: TRANSURETHRAL RESECTION OF PROSTATE WITH GREENLIGHT LASER;  Surgeon: Todd Ponce MD;  Location:  [facility];  Service: Urology;  Laterality: N/A;    INSERT / REPLACE / REMOVE PACEMAKER  03/2010    JOINT REPLACEMENT  2018    KNEE ARTHROSCOPY Right     OTHER SURGICAL HISTORY      defibulator with pacemaker --Medtronic, Cardiologist Dr. Сергей [facility]es [facility]    PACEMAKER IMPLANTATION  05/29/2019    battery change per patient    PROSTATE SURGERY  2022    in march and dec    REPLACEMENT TOTAL KNEE Left 11/17/2018    VASECTOMY  1985    VESICOSTOMY      WISDOM TOOTH EXTRACTION      3 out 4 removed.        Family History   Problem Relation Age of Onset    Hypertension Mother     Heart failure Mother     Stroke Mother     Diabetes Mother     Heart disease Mother     Heart attack Father     Diabetes Father     Heart disease Father     Vision loss Father     No Known Problems Sister     No Known Problems Sister     Leukemia Sister     Cerebral palsy Brother     Hearing loss Brother     No Known Problems Brother     No Known Problems Brother     No Known Problems Brother     No Known Problems Daughter     Diabetes Son        Social History     Socioeconomic History    Marital status:    Tobacco Use    Smoking status: Former     Current packs/day: 0.00     Average packs/day: 1 pack/day for 25.0 years (25.0 ttl pk-yrs)     Types: Cigarettes, Pipe     Start date: 1/1/1971     Quit  date: 1996     Years since quittin.5     Passive exposure: Past    Smokeless tobacco: Never    Tobacco comments:     quit 25 years ago   Vaping Use    Vaping status: Never Used   Substance and Sexual Activity    Alcohol use: Not Currently    Drug use: Never    Sexual activity: Not Currently     Partners: Female     Birth control/protection: Vasectomy, Surgical         Current Outpatient Medications:     amLODIPine (NORVASC) 10 MG tablet, Take 1 tablet by mouth Daily., Disp: , Rfl:     apixaban (Eliquis) 5 MG tablet tablet, TAKE 1 TABLET BY MOUTH EVERY 12 HOURS, Disp: 180 tablet, Rfl: 3    atorvastatin (LIPITOR) 40 MG tablet, TAKE 1 TABLET BY MOUTH AT BEDTIME, Disp: 90 tablet, Rfl: 0    carvedilol (COREG) 25 MG tablet, TAKE 1 TABLET BY MOUTH 2 TIMES A DAY WITH MEALS, Disp: 180 tablet, Rfl: 0    clopidogrel (PLAVIX) 75 MG tablet, TAKE 1 TABLET BY MOUTH EVERY DAY, Disp: 90 tablet, Rfl: 3    coenzyme Q10 100 MG capsule, Take 1 capsule by mouth Daily., Disp: , Rfl:     furosemide (LASIX) 20 MG tablet, Take 1 tablet by mouth As Needed., Disp: , Rfl:     glimepiride (AMARYL) 4 MG tablet, TAKE 1 TABLET BY MOUTH 2 TIMES A DAY, Disp: 180 tablet, Rfl: 0    glucose blood (OneTouch Verio) test strip, USE TO CHECK BLOOD GLUCOSE ONCE DAILY OR AS DIRECTED, Disp: 100 each, Rfl: 0    glucose monitor monitoring kit, 1 each As Needed (for diabetes)., Disp: 1 each, Rfl: 0    hydrALAZINE (APRESOLINE) 50 MG tablet, Take 1 tablet by mouth 2 (Two) Times a Day., Disp: 60 tablet, Rfl: 11    hydrOXYzine (ATARAX) 25 MG tablet, TAKE 1 TABLET BY MOUTH AT NIGHT AS NEEDED FOR INSOMNIA, Disp: 30 tablet, Rfl: 0    Lancets (onetouch ultrasoft) lancets, Check daily and prn, Disp: 100 each, Rfl: 12    metFORMIN (GLUCOPHAGE) 850 MG tablet, TAKE 1/2 TABLET BY MOUTH 2 TIMES A DAY, Disp: 90 tablet, Rfl: 0    Multiple Vitamin (MULTI-VITAMIN DAILY PO), Take 1 tablet by mouth Daily., Disp: , Rfl:     nitroglycerin (NITROSTAT) 0.3 MG SL tablet, PLACE 1  "TABLET UNDER TONGUE FOR CHEST PAIN. CALL 911 IF NO IMPROVEMENT AFTER 15 MIN. REPEAT DOSE  EVERY 5 MINUTES TWICE IF NEEDED. MAX 3 DOSES IN 15 MINUTES, Disp: 100 tablet, Rfl: 0    ondansetron ODT (ZOFRAN-ODT) 4 MG disintegrating tablet, Place 1 tablet on the tongue Every 8 (Eight) Hours As Needed., Disp: , Rfl:     Potassium Gluconate 2 MEQ tablet, Take 2 mEq by mouth Daily., Disp: , Rfl:     Semaglutide,0.25 or 0.5MG/DOS, (Ozempic, 0.25 or 0.5 MG/DOSE,) 2 MG/3ML solution pen-injector, Inject 1 mg under the skin into the appropriate area as directed 1 (One) Time Per Week., Disp: , Rfl:     sertraline (ZOLOFT) 25 MG tablet, TAKE 1 TABLET BY MOUTH EVERY DAY, Disp: 90 tablet, Rfl: 0    spironolactone (ALDACTONE) 25 MG tablet, Take 1 tablet by mouth Daily., Disp: , Rfl:     tamsulosin (FLOMAX) 0.4 MG capsule 24 hr capsule, Take 1 capsule by mouth Daily., Disp: 90 capsule, Rfl: 2    valsartan (DIOVAN) 320 MG tablet, TAKE 1 TABLET BY MOUTH EVERY DAY, Disp: 90 tablet, Rfl: 0    Allergies:   Allergies   Allergen Reactions    Cyclobenzaprine Hives    Floxacillin (Flucloxacillin) Hives    Poison Ivy Extract Unknown - Low Severity       Objective   Vital Signs: Blood pressure 130/78, pulse 69, height 185.4 cm (72.99\"), weight 118 kg (260 lb 3.2 oz), SpO2 96%.    PHYSICAL EXAM  General appearance: Awake, alert, cooperative  Head: Normocephalic, without obvious abnormality, atraumatic  Lungs: Clear to ascultation bilaterally  Heart: Regular rate and rhythm, no murmurs, no lower extremity swelling  Skin: Skin color, turgor normal, no rashes or lesions  Neurologic: Grossly normal     Lab Results   Component Value Date    GLUCOSE 180 (H) 11/22/2023    CALCIUM 9.7 11/22/2023     (L) 11/22/2023    K 4.5 11/22/2023    CO2 22.9 11/22/2023    CL 99 11/22/2023    BUN 14 11/22/2023    CREATININE 1.30 07/08/2024    EGFRIFAFRI 72 02/24/2021    EGFRIFNONA 60 (L) 02/24/2021    BCR 12.2 11/22/2023    ANIONGAP 13.1 11/22/2023     Lab " Results   Component Value Date    WBC 6.05 11/22/2023    HGB 11.5 (L) 11/22/2023    HCT 34.6 (L) 11/22/2023    MCV 81.6 11/22/2023     11/22/2023     Lab Results   Component Value Date    INR 0.97 11/23/2022    PROTIME 12.8 11/23/2022     Lab Results   Component Value Date    TSH 1.160 02/24/2021          Results for orders placed during the hospital encounter of 07/24/23    Adult Transthoracic Echo Complete w/ Color, Spectral and Contrast if necessary per protocol    Interpretation Summary    Left ventricular systolic function is normal. Estimated left ventricular EF = 60%    Left ventricular wall thickness is consistent with mild concentric hypertrophy.    Mild mitral and tricuspid regurgitation.    Calculated right ventricular systolic pressure from tricuspid regurgitation is 30 mmHg.    Bright, thickened, echogenic area on lead which appears slightly mobile.  Most likely thrombus but cannot exclude vegetation.  Clinical correlation required.     Results for orders placed during the hospital encounter of 07/28/23    Optical Coherence Tomography    Conclusion    Mid RCA 95% ISR status post PCI with overlapping 4.0 x 28 and 3.5 x 12 mm Xience WILLEM.    Ostial circumflex 90% heavily calcified stenosis status post intravascular lithotripsy with a 3.0 shockwave balloon, and PCI with a 3.0 x 33 mm Xience WILLEM overlapping with a 3.0 x 18 mm Xience WILLEM to cover the mid 90% circumflex lesion.  Postdilated with a 3.5 NC balloon high-pressure.    Widely patent LAD stents with no significant stenosis.    LV pressures (S/D/E) : 90/-8/4 mmHg    Aspirin 81 mg for 30 days  Clopidogrel 75 mg daily for no less than 12-month  Resume Eliquis 5 mg twice daily evening of 7/29/2023      I personally viewed and interpreted the patient's EKG/Telemetry/lab data    Procedures    Omar Can  reports that he quit smoking about 28 years ago. His smoking use included cigarettes and pipe. He started smoking about 53 years ago. He has  a 25 pack-year smoking history. He has been exposed to tobacco smoke. He has never used smokeless tobacco.     Advance Care Planning   Advance Care Planning: ACP discussion was declined by the patient. Patient has an advance directive (not in EMR), copy requested.     Assessment & Plan    1. Ischemic cardiomyopathy  S/p BiV ICD  Recovered LVEF as of most recent echo 7/2023.  Most recent left heart catheterization documented just above and several interventions.  CAD and heart failure managed by Dr. Li  GDMT: Valsartan, Coreg and spironolactone  NYHA class I symptoms  Plavix 75 daily    2. VT (ventricular tachycardia)  S/p BiV ICD.  2 short, asymptomatic episodes of NSVT both <1-second each since last device check in January.  Continue carvedilol    3. Biventricular implantable cardioverter-defibrillator (ICD) in situ  Patient's Medtronic biventricular ICD was interrogated today demonstrating normal function with just under 6 years left on the battery, 90% atrial pacing, 100% biventricular pacing, acceptable threshold and impedance values of all 3 leads, and events consisting of 2 short NSVT episodes <1-second each and an atrial fibrillation episode lasting 6 hours in May.    4. Paroxysmal atrial fibrillation  6-hour episode in May.  Patient reports no recollection of any symptoms around that time.  Continue Eliquis for stroke prophylaxis and carvedilol for rate control.    5. Primary hypertension  BP controlled in the office today.  Continue current antihypertensive regimen.       Follow Up:  Return in about 6 months (around 1/16/2025).      Thank you for allowing me to participate in the care of your patient. Please do not hesitate to contact me with additional questions or concerns.      Charan Castillo PA-C  Cardiac Electrophysiology  [facility]

## 2024-07-22 RX ORDER — SPIRONOLACTONE 25 MG/1
25 TABLET ORAL DAILY
Qty: 30 TABLET | Refills: 6 | Status: SHIPPED | OUTPATIENT
Start: 2024-07-22

## 2024-08-15 ENCOUNTER — OFFICE VISIT (OUTPATIENT)
Dept: CARDIOLOGY | Facility: CLINIC | Age: 64
End: 2024-08-15
Payer: MEDICARE

## 2024-08-15 VITALS
WEIGHT: 259.6 LBS | BODY MASS INDEX: 34.4 KG/M2 | HEART RATE: 72 BPM | DIASTOLIC BLOOD PRESSURE: 56 MMHG | HEIGHT: 73 IN | SYSTOLIC BLOOD PRESSURE: 120 MMHG | OXYGEN SATURATION: 94 %

## 2024-08-15 DIAGNOSIS — Z95.810 BIVENTRICULAR IMPLANTABLE CARDIOVERTER-DEFIBRILLATOR (ICD) IN SITU: Chronic | ICD-10-CM

## 2024-08-15 DIAGNOSIS — I25.5 ISCHEMIC CARDIOMYOPATHY: ICD-10-CM

## 2024-08-15 DIAGNOSIS — I10 PRIMARY HYPERTENSION: Chronic | ICD-10-CM

## 2024-08-15 DIAGNOSIS — E78.2 MIXED HYPERLIPIDEMIA: Chronic | ICD-10-CM

## 2024-08-15 DIAGNOSIS — I25.10 CORONARY ARTERIOSCLEROSIS IN NATIVE ARTERY: Primary | ICD-10-CM

## 2024-08-15 PROCEDURE — 99214 OFFICE O/P EST MOD 30 MIN: CPT | Performed by: INTERNAL MEDICINE

## 2024-08-15 PROCEDURE — 3078F DIAST BP <80 MM HG: CPT | Performed by: INTERNAL MEDICINE

## 2024-08-15 PROCEDURE — 3074F SYST BP LT 130 MM HG: CPT | Performed by: INTERNAL MEDICINE

## 2024-08-15 RX ORDER — SPIRONOLACTONE 25 MG/1
25 TABLET ORAL DAILY
Qty: 90 TABLET | Refills: 3 | Status: SHIPPED | OUTPATIENT
Start: 2024-08-15

## 2024-08-15 RX ORDER — ASPIRIN 81 MG/1
81 TABLET ORAL DAILY
Start: 2024-08-15

## 2024-08-15 NOTE — PROGRESS NOTES
OFFICE VISIT  NOTE  [facility] CARDIOLOGY      Name: Omar Can    Date: 8/15/2024  MRN:  [id number]  :  1960      REFERRING/PRIMARY PROVIDER:  Margarito Hunter MD     Chief Complaint   Patient presents with    Coronary arteriosclerosis in native artery     HPI: Omar Can is a 64 y.o. male who presents for CAD, cath and PCI 2023. History of CAD, ICM, s/p Medtronic BiV ICD, VF, PAF, HTN, HLD, DM2 and CHEKO. Had VF with ICD shocks 2023 and subsequent cath with mid RCA 95% ISR s/p overlapping WILLEM x2, ostial LCx s/p lithotripsy and overlapping WILLEM x2, widely patent LAD stents.  Echo 2023 with normal EF, mild LVH, bright echogenic area on lead which most likely represents a thrombus, but vegetation not excluded. He is on Plavix and Eliquis.     Overall doing well does have a dry cough after he eats in the evening.  Processes by walking most days of the week.    ROS:Pertinent positives as listed in the HPI.  All other systems reviewed and negative.    Past Medical History:   Diagnosis Date    Arthritis     Benign prostatic hyperplasia     CHF (congestive heart failure)     Colon polyps     Coronary artery disease     Coronary stent patent     x 5 stents     Diabetes mellitus     Elevated cholesterol     Hypertension     Myocardial infarction     cardiac arrest    Obesity     Paroxysmal atrial fibrillation 2023    Prostate hypertrophy     Sleep apnea     cpap nightly    Stage 3 chronic kidney disease 2020       Past Surgical History:   Procedure Laterality Date    CARDIAC CATHETERIZATION      CARDIAC CATHETERIZATION N/A 2023    Procedure: Left Heart Cath;  Surgeon: Rich Li MD;  Location:  [facility] CATH INVASIVE LOCATION;  Service: Cardiovascular;  Laterality: N/A;    CARDIAC CATHETERIZATION N/A 2023    Procedure: Stent WILLEM coronary;  Surgeon: Rich Li MD;  Location: [facility] CATH INVASIVE LOCATION;  Service: Cardiovascular;  Laterality:  N/A;    CARDIAC CATHETERIZATION N/A 2023    Procedure: Optical Coherence Tomography;  Surgeon: Rich Li MD;  Location: [facility] CATH INVASIVE LOCATION;  Service: Cardiovascular;  Laterality: N/A;    CARDIAC DEFIBRILLATOR PLACEMENT  2010    Implanted after cardiac arrest per patient    CARDIAC ELECTROPHYSIOLOGY PROCEDURE N/A 2023    Procedure: Lead Revision- new RV lead- has Medtronic device- holding Eliquis as of 23;  Surgeon: Brayan Epps MD;  Location:  [facility] INVASIVE LOCATION;  Service: Cardiology;  Laterality: N/A;    COLONOSCOPY      CORONARY STENT PLACEMENT      5 stents placed at one time    CYSTOSCOPY      CYSTOSCOPY TRANSURETHRAL RESECTION OF PROSTATE N/A 2022    Procedure: GREENLIGHT LASER VAPORIZATION OF PROSTATE;  Surgeon: Todd Ponce MD;  Location:  [facility] OR;  Service: Urology;  Laterality: N/A;    CYSTOSCOPY TRANSURETHRAL RESECTION OF PROSTATE N/A 2022    Procedure: TRANSURETHRAL RESECTION OF PROSTATE WITH GREENLIGHT LASER;  Surgeon: Todd Ponce MD;  Location:  [facility] OR;  Service: Urology;  Laterality: N/A;    INSERT / REPLACE / REMOVE PACEMAKER  2010    JOINT REPLACEMENT  2018    KNEE ARTHROSCOPY Right     OTHER SURGICAL HISTORY      defibulator with pacemaker --Medtronic, Cardiologist Dr. Сергей Lees [facility]    PACEMAKER IMPLANTATION  2019    battery change per patient    PROSTATE SURGERY      in march and dec    REPLACEMENT TOTAL KNEE Left 2018    VASECTOMY  1985    VESICOSTOMY      WISDOM TOOTH EXTRACTION      3 out 4 removed.        Social History     Socioeconomic History    Marital status:    Tobacco Use    Smoking status: Former     Current packs/day: 0.00     Average packs/day: 1 pack/day for 25.0 years (25.0 ttl pk-yrs)     Types: Cigarettes, Pipe     Start date: 1971     Quit date: 1996     Years since quittin.6     Passive exposure: Past    Smokeless tobacco: Never    Tobacco comments:      quit 25 years ago   Vaping Use    Vaping status: Never Used   Substance and Sexual Activity    Alcohol use: Not Currently    Drug use: Never    Sexual activity: Not Currently     Partners: Female     Birth control/protection: Vasectomy, Surgical       Family History   Problem Relation Age of Onset    Hypertension Mother     Heart failure Mother     Stroke Mother     Diabetes Mother     Heart disease Mother     Heart attack Father     Diabetes Father     Heart disease Father     Vision loss Father     No Known Problems Sister     No Known Problems Sister     Leukemia Sister     Cerebral palsy Brother     Hearing loss Brother     No Known Problems Brother     No Known Problems Brother     No Known Problems Brother     No Known Problems Daughter     Diabetes Son         Allergies   Allergen Reactions    Cyclobenzaprine Hives    Floxacillin (Flucloxacillin) Hives    Poison Ivy Extract Unknown - Low Severity       Current Outpatient Medications   Medication Instructions    amLODIPine (NORVASC) 10 mg, Oral, Daily    apixaban (Eliquis) 5 MG tablet tablet TAKE 1 TABLET BY MOUTH EVERY 12 HOURS    aspirin 81 mg, Oral, Daily    atorvastatin (LIPITOR) 40 mg, Oral, Every Night at Bedtime    carvedilol (COREG) 25 mg, Oral, 2 Times Daily With Meals    coenzyme Q10 100 mg, Oral, Daily    furosemide (LASIX) 20 mg, Oral, As Needed    glimepiride (AMARYL) 4 mg, Oral, 2 Times Daily    glucose blood (OneTouch Verio) test strip USE TO CHECK BLOOD GLUCOSE ONCE DAILY OR AS DIRECTED    glucose monitor monitoring kit 1 each, Does not apply, As Needed    hydrALAZINE (APRESOLINE) 50 mg, Oral, 2 Times Daily    hydrOXYzine (ATARAX) 25 MG tablet TAKE 1 TABLET BY MOUTH AT NIGHT AS NEEDED FOR INSOMNIA    Lancets (onetouch ultrasoft) lancets Check daily and prn    metFORMIN (GLUCOPHAGE) 425 mg, Oral, 2 Times Daily    Multiple Vitamin (MULTI-VITAMIN DAILY PO) 1 tablet, Oral, Daily    nitroglycerin (NITROSTAT) 0.3 MG SL tablet PLACE 1 TABLET UNDER  "TONGUE FOR CHEST PAIN. CALL 911 IF NO IMPROVEMENT AFTER 15 MIN. REPEAT DOSE  EVERY 5 MINUTES TWICE IF NEEDED. MAX 3 DOSES IN 15 MINUTES    ondansetron ODT (ZOFRAN-ODT) 4 mg, Translingual, Every 8 Hours PRN    Ozempic (0.25 or 0.5 MG/DOSE) 1 mg, Subcutaneous, Weekly    Potassium Gluconate 2 MEQ tablet 2 mEq, Oral, Daily    sertraline (ZOLOFT) 25 mg, Oral, Daily    spironolactone (ALDACTONE) 25 mg, Oral, Daily    tamsulosin (FLOMAX) 0.4 mg, Oral, Daily    valsartan (DIOVAN) 320 mg, Oral, Daily       Vitals:    08/15/24 1013   BP: 120/56   BP Location: Left arm   Patient Position: Sitting   Cuff Size: Adult   Pulse: 72   SpO2: 94%   Weight: 118 kg (259 lb 9.6 oz)   Height: 185.4 cm (73\")       Body mass index is 34.25 kg/m².    PHYSICAL EXAM:    General Appearance:   well developed  well nourished  Neck:  thyroid not enlarged  supple  Respiratory:  no respiratory distress  normal breath sounds  no rales  Cardiovascular:  no jugular venous distention  regular rhythm  apical impulse normal  S1 normal, S2 normal  no S3, no S4   no murmur  no rub, no thrill  carotid pulses normal; no bruit  pedal pulses normal  lower extremity edema: none    Skin:   warm, dry    RESULTS:   Procedures    Results for orders placed during the hospital encounter of 07/24/23    Adult Transthoracic Echo Complete w/ Color, Spectral and Contrast if necessary per protocol    Interpretation Summary    Left ventricular systolic function is normal. Estimated left ventricular EF = 60%    Left ventricular wall thickness is consistent with mild concentric hypertrophy.    Mild mitral and tricuspid regurgitation.    Calculated right ventricular systolic pressure from tricuspid regurgitation is 30 mmHg.    Bright, thickened, echogenic area on lead which appears slightly mobile.  Most likely thrombus but cannot exclude vegetation.  Clinical correlation required.        Labs:  Lab Results   Component Value Date    CHOL 146 07/28/2023    TRIG 262 (H) " 07/28/2023    HDL 31 (L) 07/28/2023    LDL 72 07/28/2023    AST 23 11/22/2023    ALT 28 11/22/2023     Lab Results   Component Value Date    HGBA1C 7.6 (A) 03/08/2024     Creatinine   Date Value Ref Range Status   07/08/2024 1.30 0.60 - 1.30 mg/dL Final     Comment:     Serial Number: [id number]:  [id number]   04/01/2024 1.10 0.60 - 1.30 mg/dL Final     Comment:     Serial Number: [id number]:  [id number]   11/22/2023 1.15 0.76 - 1.27 mg/dL Final   07/28/2023 1.10 0.60 - 1.30 mg/dL Final   07/28/2023 1.18 0.76 - 1.27 mg/dL Final     eGFR Non  Am   Date Value Ref Range Status   02/24/2021 60 (L) >60 mL/min/1.73 Final     Comment:     GFR Normal >60  Chronic Kidney Disease <60  Kidney Failure <15     06/27/2020 50 (L) >60 mL/min/1.73 Final     Comment:         GFR Normal              >60      Chronic Kidney Disease  <60      Kidney Failure          <15     01/03/2020 52 (L) >60 mL/min/1.73 Final     eGFR Non  Amer   Date Value Ref Range Status   06/27/2020 50 (L) >60 mL/min/1.73 Final         ASSESSMENT:  Problem List Items Addressed This Visit       Hyperlipidemia (Chronic)    Overview     From Automated Load;Provider: Margarito Damon;Status: Active         Coronary arteriosclerosis in native artery - Primary    Overview     7/20/2023: LHC at [facility], mid RCA 95% ISR status post PCI with 4.0 x 28 overlapping with a 3.5 x 12 mm Xience WILLEM, ostial circumflex 90% heavily calcified stenosis in mid circumflex 90% stenosis status post PCI with overlapping 3.0 x 33 and 3.0 x 18 mm Xience WILLEM postdilated with 3.5 NC proximally.  Widely patent LAD stents with no significant ISR, LVEDP 4 mm [place]  From Automated Load;Provider: Margarito Damon;Status: Active         Biventricular implantable cardioverter-defibrillator (ICD) in situ (Chronic)    Primary hypertension (Chronic)    Relevant Medications    spironolactone (ALDACTONE) 25 MG tablet    Ischemic cardiomyopathy       PLAN:    Coronary artery disease   Recent cath  07/2023 with overlapping WILLEM x2 to RCA and overlapping WILLEM x2 to proximal LCx; widely patent LAD stents  Switch Plavix to aspirin 81 mg daily as he completed 12 months post PCI of Plavix  Continue statin and Coreg  Continue exercise    2.   ICM, s/p BiV ICD   Recent echo 07/2023 with normal EF  ICD followed by EP    Dr. Kramer commented on an echogenic area on one of his device leads, d/w Dr. Epps and it's likely scar tissue formation.    3.  PAF  On Eliquis, followed by Dr. Epps     4. Hypertension   Last visit had orthostatic symptoms advised patient to decrease spironolactone to 12.5 mg daily if this does not help in 2 weeks I would stop the medication completely.    Continue taking blood pressure at home, goal is 140 systolic    5. Hyperlipidemia   Goal LDL less than 70, continue atorvastatin 40 mg daily    6.  Dry cough after dinner:  Likely GERD will discuss with his PCP in 4 days.    Advance Care Planning   ACP discussion was held with the patient during this visit. Patient has an advance directive (not in EMR), copy requested.          Follow-up   Return in about 1 year (around 8/15/2025).    Rich Li MD, FACC, [facility]  Interventional Cardiology

## 2024-08-26 DIAGNOSIS — F41.9 ANXIETY: ICD-10-CM

## 2024-08-26 RX ORDER — SERTRALINE HYDROCHLORIDE 25 MG/1
25 TABLET, FILM COATED ORAL DAILY
Qty: 90 TABLET | Refills: 0 | OUTPATIENT
Start: 2024-08-26

## 2024-08-27 DIAGNOSIS — F41.9 ANXIETY: ICD-10-CM

## 2024-08-28 DIAGNOSIS — Z76.0 MEDICATION REFILL: ICD-10-CM

## 2024-08-28 DIAGNOSIS — I10 PRIMARY HYPERTENSION: ICD-10-CM

## 2024-08-28 RX ORDER — SERTRALINE HYDROCHLORIDE 25 MG/1
25 TABLET, FILM COATED ORAL DAILY
Qty: 90 TABLET | Refills: 0 | OUTPATIENT
Start: 2024-08-28

## 2024-08-29 RX ORDER — VALSARTAN 320 MG/1
320 TABLET ORAL DAILY
Qty: 90 TABLET | Refills: 0 | OUTPATIENT
Start: 2024-08-29

## 2024-08-29 RX ORDER — CARVEDILOL 25 MG/1
25 TABLET ORAL 2 TIMES DAILY WITH MEALS
Qty: 180 TABLET | Refills: 0 | OUTPATIENT
Start: 2024-08-29

## 2024-09-27 DIAGNOSIS — Z76.0 MEDICATION REFILL: ICD-10-CM

## 2024-09-27 DIAGNOSIS — E11.9 TYPE 2 DIABETES MELLITUS TREATED WITHOUT INSULIN: ICD-10-CM

## 2024-10-09 DIAGNOSIS — E78.5 HYPERLIPIDEMIA, UNSPECIFIED HYPERLIPIDEMIA TYPE: ICD-10-CM

## 2024-10-09 DIAGNOSIS — Z76.0 MEDICATION REFILL: ICD-10-CM

## 2024-10-10 RX ORDER — ATORVASTATIN CALCIUM 40 MG/1
40 TABLET, FILM COATED ORAL
Qty: 90 TABLET | Refills: 0 | OUTPATIENT
Start: 2024-10-10

## 2024-11-20 PROCEDURE — 93295 DEV INTERROG REMOTE 1/2/MLT: CPT | Performed by: STUDENT IN AN ORGANIZED HEALTH CARE EDUCATION/TRAINING PROGRAM

## 2024-11-20 PROCEDURE — 93296 REM INTERROG EVL PM/IDS: CPT | Performed by: STUDENT IN AN ORGANIZED HEALTH CARE EDUCATION/TRAINING PROGRAM

## 2024-12-11 NOTE — PROGRESS NOTES
Cardiac Electrophysiology Outpatient Note  [facility] at [facility]    Office Visit     Omar Can  [id number]  01/21/2025    Primary Care Physician: Tyra Turner APRN    Referred By: No ref. provider found    Subjective     Chief Complaint   Patient presents with    Cardiomyopathy     Cardiac PMH: (Old records have been reviewed and summarized below)  Ischemic heart disease  Cardiac arrest admission to [facility] March 26, 2010 with left heart catheterization by Dr. MIKE with emergent catheter-based intervention.  (Database insufficiency)  Repeat left heart catheterization 48 hours with catheter-based invention 2 stents other vessels (database insufficiency)  MDT BIVICD placed 2010 [facility] (database insufficiency ) Dr. Quiroga  [employer] BIVIICD generator change November 7, 2019  Echocardiogram May 20, 2021 LVH EF 60% mild AI  Normal myocardial perfusion study May 20, 2021 EF calculated 42%.  RV Lead Revision 4/26/2023 Dr. Epps 6/28/2023  Myocardial perfusion imaging indicates ischemia inferior wall and inferolateral wall EF 50% July 24, 2023  Echocardiogram July 24, 2023 normal LV function mild LVH, bright thickened echogenic area mobile on RV lead most likely thrombus  [facility] 7/2023 by Dr. Li: Mid RCA 95% ISR s/p PCI with overlapping WILLEM, ostial circumflex 90% heavily calcified stenosis s/p intravascular lithotripsy and PCI with WILLEM overlapping x 2, widely patent LAD stents  VF ICD shock ICD  x 3 Most recent 7/11/2023   New onset A. fib: Documented by interrogation today August 2021 episode lasted 13 hours.  Hypertension  Hyperlipidemia  BPH, recent cystoscope and recent scope with Laser for prostate. Dr. Ponce  Diabetes mellitus type 2  Obstructive sleep apnea, Compliance with CPAP    History of Present Illness:   Omar Can is a 64 y.o. male who presents to my electrophysiology clinic for follow up of  ischemic cardiomyopathy  and VT/VF s/p biventricular ICD, paroxysmal atrial fibrillation and hypertension.  Patient was last seen by me in July 2024.  Since then, he has done well physically.  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  He is not having significant limitations with this and he is seeing improvement in his functional capacity.  He denies any chest pain, shortness of breath, heart racing, lower extremity edema or syncope.  Unfortunately [employer] rep was not able to make it to her appointment today but recent device transmissions look appropriate.      Past Medical History:   Diagnosis Date    Acid reflux     Arthritis     Benign prostatic hyperplasia     CHF (congestive heart failure)     Colon polyps     Coronary artery disease     Coronary stent patent     x 5 stents     Diabetes mellitus     Elevated cholesterol     Hypertension     Myocardial infarction 2010    cardiac arrest    Obesity     Paroxysmal atrial fibrillation 01/17/2023    Prostate hypertrophy     Sleep apnea     cpap nightly    Stage 3 chronic kidney disease 08/19/2020       Past Surgical History:   Procedure Laterality Date    CARDIAC CATHETERIZATION      CARDIAC CATHETERIZATION N/A 7/28/2023    Procedure: Left Heart Cath;  Surgeon: Rich Li MD;  Location:  [facility];  Service: Cardiovascular;  Laterality: N/A;    CARDIAC CATHETERIZATION N/A 7/28/2023    Procedure: Stent WILLEM coronary;  Surgeon: Rich Li MD;  Location:  [facility];  Service: Cardiovascular;  Laterality: N/A;    CARDIAC CATHETERIZATION N/A 7/28/2023    Procedure: Optical Coherence Tomography;  Surgeon: Rich Li MD;  Location:  [facility];  Service: Cardiovascular;  Laterality: N/A;    CARDIAC DEFIBRILLATOR PLACEMENT  03/2010    Implanted after cardiac arrest per patient    CARDIAC ELECTROPHYSIOLOGY PROCEDURE N/A 4/26/2023    Procedure: Lead Revision- new RV lead-  has [employer] device- holding Eliquis as of 4/24/23;  Surgeon: Brayan Epps MD;  Location:  [facility];  Service: Cardiology;  Laterality: N/A;    COLONOSCOPY      CORONARY STENT PLACEMENT      5 stents placed at one time    CYSTOSCOPY      CYSTOSCOPY TRANSURETHRAL RESECTION OF PROSTATE N/A 03/02/2022    Procedure: GREENLIGHT LASER VAPORIZATION OF PROSTATE;  Surgeon: Todd Ponce MD;  Location:  [facility];  Service: Urology;  Laterality: N/A;    CYSTOSCOPY TRANSURETHRAL RESECTION OF PROSTATE N/A 12/07/2022    Procedure: TRANSURETHRAL RESECTION OF PROSTATE WITH GREENLIGHT LASER;  Surgeon: Todd Ponce MD;  Location:  [facility];  Service: Urology;  Laterality: N/A;    INSERT / REPLACE / REMOVE PACEMAKER  03/2010    JOINT REPLACEMENT  2018    KNEE ARTHROSCOPY Right     OTHER SURGICAL HISTORY      defibulator with pacemaker --[employer], Cardiologist Dr. Сергей Lees [facility]    PACEMAKER IMPLANTATION  05/29/2019    battery change per patient    PROSTATE SURGERY  2022    in march and dec    REPLACEMENT TOTAL KNEE Left 11/17/2018    VASECTOMY  1985    VESICOSTOMY      WISDOM TOOTH EXTRACTION      3 out 4 removed.        Family History   Problem Relation Age of Onset    Hypertension Mother     Heart failure Mother     Stroke Mother     Diabetes Mother     Heart disease Mother     Heart attack Father     Diabetes Father     Heart disease Father     Vision loss Father     No Known Problems Sister     No Known Problems Sister     Leukemia Sister     Cerebral palsy Brother     Hearing loss Brother     No Known Problems Brother     No Known Problems Brother     No Known Problems Brother     No Known Problems Daughter     Diabetes Son        Social History     Socioeconomic History    Marital status:    Tobacco Use    Smoking status: Former     Current packs/day: 0.00     Average packs/day: 1 pack/day for 25.0 years (25.0 ttl pk-yrs)     Types: Cigarettes, Pipe     Start date: 1/1/1971      Quit date: 1996     Years since quittin.0     Passive exposure: Past    Smokeless tobacco: Never    Tobacco comments:     quit 25 years ago   Vaping Use    Vaping status: Never Used   Substance and Sexual Activity    Alcohol use: Not Currently    Drug use: Never    Sexual activity: Not Currently     Partners: Female     Birth control/protection: Vasectomy, Surgical         Current Outpatient Medications:     amLODIPine (NORVASC) 10 MG tablet, Take 1 tablet by mouth Daily., Disp: , Rfl:     apixaban (Eliquis) 5 MG tablet tablet, TAKE 1 TABLET BY MOUTH EVERY 12 HOURS, Disp: 180 tablet, Rfl: 3    aspirin 81 MG EC tablet, Take 1 tablet by mouth Daily., Disp: , Rfl:     atorvastatin (LIPITOR) 40 MG tablet, TAKE 1 TABLET BY MOUTH AT BEDTIME, Disp: 90 tablet, Rfl: 0    carvedilol (COREG) 25 MG tablet, TAKE 1 TABLET BY MOUTH 2 TIMES A DAY WITH MEALS, Disp: 180 tablet, Rfl: 0    coenzyme Q10 100 MG capsule, Take 1 capsule by mouth Daily., Disp: , Rfl:     furosemide (LASIX) 20 MG tablet, Take 1 tablet by mouth As Needed., Disp: , Rfl:     glimepiride (AMARYL) 4 MG tablet, TAKE 1 TABLET BY MOUTH 2 TIMES A DAY, Disp: 180 tablet, Rfl: 0    glucose blood (OneTouch Verio) test strip, USE TO CHECK BLOOD GLUCOSE ONCE DAILY OR AS DIRECTED, Disp: 100 each, Rfl: 0    glucose monitor monitoring kit, 1 each As Needed (for diabetes)., Disp: 1 each, Rfl: 0    hydrALAZINE (APRESOLINE) 50 MG tablet, Take 1 tablet by mouth 2 (Two) Times a Day., Disp: 180 tablet, Rfl: 3    hydrOXYzine (ATARAX) 25 MG tablet, TAKE 1 TABLET BY MOUTH AT NIGHT AS NEEDED FOR INSOMNIA, Disp: 30 tablet, Rfl: 0    Lancets (onetouch ultrasoft) lancets, Check daily and prn, Disp: 100 each, Rfl: 12    metFORMIN (GLUCOPHAGE) 850 MG tablet, TAKE 1/2 TABLET BY MOUTH 2 TIMES A DAY, Disp: 90 tablet, Rfl: 0    Multiple Vitamin (MULTI-VITAMIN DAILY PO), Take 1 tablet by mouth Daily., Disp: , Rfl:     nitroglycerin (NITROSTAT) 0.3 MG SL tablet, PLACE 1 TABLET UNDER  "TONGUE FOR CHEST PAIN. CALL 911 IF NO IMPROVEMENT AFTER 15 MIN. REPEAT DOSE  EVERY 5 MINUTES TWICE IF NEEDED. MAX 3 DOSES IN 15 MINUTES, Disp: 100 tablet, Rfl: 0    omeprazole (priLOSEC) 40 MG capsule, Take 1 capsule by mouth Daily., Disp: , Rfl:     ondansetron ODT (ZOFRAN-ODT) 4 MG disintegrating tablet, Place 1 tablet on the tongue Every 8 (Eight) Hours As Needed., Disp: , Rfl:     Potassium Gluconate 2 MEQ tablet, Take 2 mEq by mouth Daily., Disp: , Rfl:     Semaglutide,0.25 or 0.5MG/DOS, (Ozempic, 0.25 or 0.5 MG/DOSE,) 2 MG/3ML solution pen-injector, Inject 1 mg under the skin into the appropriate area as directed 1 (One) Time Per Week., Disp: , Rfl:     sertraline (ZOLOFT) 25 MG tablet, TAKE 1 TABLET BY MOUTH EVERY DAY, Disp: 90 tablet, Rfl: 0    spironolactone (ALDACTONE) 25 MG tablet, Take 1 tablet by mouth Daily., Disp: 90 tablet, Rfl: 3    tamsulosin (FLOMAX) 0.4 MG capsule 24 hr capsule, Take 1 capsule by mouth Daily., Disp: 90 capsule, Rfl: 2    valsartan (DIOVAN) 320 MG tablet, TAKE 1 TABLET BY MOUTH EVERY DAY, Disp: 90 tablet, Rfl: 0    Allergies:   Allergies   Allergen Reactions    Cyclobenzaprine Hives    Floxacillin (Flucloxacillin) Hives    Poison Ivy Extract Unknown - Low Severity       Objective   Vital Signs: Blood pressure 140/80, pulse 81, height 185.4 cm (73\"), weight 122 kg (268 lb), SpO2 95%.    PHYSICAL EXAM  General appearance: Awake, alert, cooperative  Head: Normocephalic, without obvious abnormality, atraumatic  Lungs: Clear to ascultation bilaterally  Heart: Regular rate and rhythm, no murmurs, no lower extremity swelling  Skin: Skin color, turgor normal, no rashes or lesions  Neurologic: Grossly normal     Lab Results   Component Value Date    GLUCOSE 180 (H) 11/22/2023    CALCIUM 9.7 11/22/2023     (L) 11/22/2023    K 4.5 11/22/2023    CO2 22.9 11/22/2023    CL 99 11/22/2023    BUN 14 11/22/2023    CREATININE 1.30 07/08/2024    EGFRIFAFRI 72 02/24/2021    EGFRIFNONA 60 (L) " 02/24/2021    BCR 12.2 11/22/2023    ANIONGAP 13.1 11/22/2023     Lab Results   Component Value Date    WBC 6.05 11/22/2023    HGB 11.5 (L) 11/22/2023    HCT 34.6 (L) 11/22/2023    MCV 81.6 11/22/2023     11/22/2023     Lab Results   Component Value Date    INR 0.97 11/23/2022    PROTIME 12.8 11/23/2022     Lab Results   Component Value Date    TSH 1.160 02/24/2021          Results for orders placed during the hospital encounter of 07/24/23    Adult Transthoracic Echo Complete w/ Color, Spectral and Contrast if necessary per protocol    Interpretation Summary    Left ventricular systolic function is normal. Estimated left ventricular EF = 60%    Left ventricular wall thickness is consistent with mild concentric hypertrophy.    Mild mitral and tricuspid regurgitation.    Calculated right ventricular systolic pressure from tricuspid regurgitation is 30 mmHg.    Bright, thickened, echogenic area on lead which appears slightly mobile.  Most likely thrombus but cannot exclude vegetation.  Clinical correlation required.     Results for orders placed during the hospital encounter of 07/28/23    Optical Coherence Tomography    Conclusion    Mid RCA 95% ISR status post PCI with overlapping 4.0 x 28 and 3.5 x 12 mm Xience WILLEM.    Ostial circumflex 90% heavily calcified stenosis status post intravascular lithotripsy with a 3.0 shockwave balloon, and PCI with a 3.0 x 33 mm Xience WILLEM overlapping with a 3.0 x 18 mm Xience WILLEM to cover the mid 90% circumflex lesion.  Postdilated with a 3.5 NC balloon high-pressure.    Widely patent LAD stents with no significant stenosis.    LV pressures (S/D/E) : 90/-8/4 mmHg    Aspirin 81 mg for 30 days  Clopidogrel 75 mg daily for no less than 12-month  Resume Eliquis 5 mg twice daily evening of 7/29/2023      I personally viewed and interpreted the patient's EKG/Telemetry/lab data      ECG 12 Lead    Date/Time: 1/21/2025 9:23 AM  Performed by: Charan Castillo PA-C    Authorized by:  Charan Castillo PA-C  Comparison: compared with previous ECG from 7/28/2023  Similar to previous ECG  Rhythm comments: Dual paced rhythm    Clinical impression: abnormal EKG            Omar Can  reports that he quit smoking about 29 years ago. His smoking use included cigarettes and pipe. He started smoking about 54 years ago. He has a 25 pack-year smoking history. He has been exposed to tobacco smoke. He has never used smokeless tobacco.       Advance Care Planning   Advance Care Planning: ACP discussion was declined by the patient. Patient does not have an advance directive, information provided.     Assessment & Plan    1. Ischemic cardiomyopathy  S/p BiV ICD  Recovered LVEF as of most recent echo 7/2023.  Most recent left heart catheterization around that same time with several interventions by Dr. Li  CAD and heart failure managed by Dr. Li  GDMT: Valsartan, Coreg and spironolactone  NYHA class I symptoms.  Euvolemic on exam  Plavix 75 daily     2. VT (ventricular tachycardia)  S/p BiV ICD.  No recurrences over the last 6 months from device transmissions. Continue carvedilol     3. Biventricular implantable cardioverter-defibrillator (ICD) in situ  [employer] device rep was unable to make it to our clinic for this appointment.  Recent device transmissions show a normally functioning device.  Only significant event was a 13-hour episode of atrial fibrillation in August.     4. Paroxysmal atrial fibrillation  13-hour episode in August.  Otherwise no significant recurrence.  Patient reports no recollection of any symptoms around that time.  Continue Eliquis for stroke prophylaxis and carvedilol for rate control.  Patient reports his insurance notified him that they will not be covering Eliquis this time they were previously.  Patient advised to call his insurance company to see if there is any other alternatives like Pradaxa or Xarelto that they are willing to cover.  He will call us if he has any  issues     5. Primary hypertension  BP elevated in the office today.  Patient reports controlled numbers at home with systolics in the 120s and 130s.  He will log his blood pressures over the next 2 weeks and call us if he starts having numbers >140 systolic.  We can always go to 3 times a day on his hydralazine. continue current antihypertensive regimen for now.    Follow Up:  Return in about 6 months (around 7/21/2025).      Thank you for allowing me to participate in the care of your patient. Please do not hesitate to contact me with additional questions or concerns.      Charan Castillo PA-C  Cardiac Electrophysiology  [facility]

## 2024-12-13 ENCOUNTER — TRANSCRIBE ORDERS (OUTPATIENT)
Dept: ADMINISTRATIVE | Facility: HOSPITAL | Age: 64
End: 2024-12-13
Payer: MEDICARE

## 2024-12-13 DIAGNOSIS — K66.8 CALCIFIED MESENTERIC MASS: Primary | ICD-10-CM

## 2024-12-18 DIAGNOSIS — I10 PRIMARY HYPERTENSION: Chronic | ICD-10-CM

## 2024-12-18 RX ORDER — HYDRALAZINE HYDROCHLORIDE 50 MG/1
50 TABLET, FILM COATED ORAL EVERY 8 HOURS
Qty: 90 TABLET | Refills: 0 | OUTPATIENT
Start: 2024-12-18

## 2024-12-31 DIAGNOSIS — Z76.0 MEDICATION REFILL: ICD-10-CM

## 2024-12-31 DIAGNOSIS — E11.9 TYPE 2 DIABETES MELLITUS TREATED WITHOUT INSULIN: ICD-10-CM

## 2025-01-06 DIAGNOSIS — I10 PRIMARY HYPERTENSION: Chronic | ICD-10-CM

## 2025-01-06 RX ORDER — HYDRALAZINE HYDROCHLORIDE 50 MG/1
50 TABLET, FILM COATED ORAL 2 TIMES DAILY
Start: 2025-01-06 | End: 2025-01-06

## 2025-01-06 RX ORDER — HYDRALAZINE HYDROCHLORIDE 50 MG/1
50 TABLET, FILM COATED ORAL 2 TIMES DAILY
Qty: 180 TABLET | Refills: 3 | Status: SHIPPED | OUTPATIENT
Start: 2025-01-06

## 2025-01-21 ENCOUNTER — OFFICE VISIT (OUTPATIENT)
Dept: CARDIOLOGY | Facility: CLINIC | Age: 65
End: 2025-01-21
Payer: MEDICARE

## 2025-01-21 VITALS
HEART RATE: 81 BPM | WEIGHT: 268 LBS | OXYGEN SATURATION: 95 % | HEIGHT: 73 IN | BODY MASS INDEX: 35.52 KG/M2 | SYSTOLIC BLOOD PRESSURE: 140 MMHG | DIASTOLIC BLOOD PRESSURE: 80 MMHG

## 2025-01-21 DIAGNOSIS — Z95.810 BIVENTRICULAR IMPLANTABLE CARDIOVERTER-DEFIBRILLATOR (ICD) IN SITU: Chronic | ICD-10-CM

## 2025-01-21 DIAGNOSIS — I10 PRIMARY HYPERTENSION: Chronic | ICD-10-CM

## 2025-01-21 DIAGNOSIS — I48.0 PAROXYSMAL ATRIAL FIBRILLATION: Chronic | ICD-10-CM

## 2025-01-21 DIAGNOSIS — I47.20 VT (VENTRICULAR TACHYCARDIA): ICD-10-CM

## 2025-01-21 DIAGNOSIS — I25.5 ISCHEMIC CARDIOMYOPATHY: Primary | Chronic | ICD-10-CM

## 2025-01-21 PROCEDURE — 3077F SYST BP >= 140 MM HG: CPT

## 2025-01-21 PROCEDURE — 93000 ELECTROCARDIOGRAM COMPLETE: CPT

## 2025-01-21 PROCEDURE — 1159F MED LIST DOCD IN RCRD: CPT

## 2025-01-21 PROCEDURE — 1160F RVW MEDS BY RX/DR IN RCRD: CPT

## 2025-01-21 PROCEDURE — 99214 OFFICE O/P EST MOD 30 MIN: CPT

## 2025-01-21 PROCEDURE — 3079F DIAST BP 80-89 MM HG: CPT

## 2025-01-21 RX ORDER — OMEPRAZOLE 40 MG/1
1 CAPSULE, DELAYED RELEASE ORAL DAILY
COMMUNITY
Start: 2024-12-04

## 2025-02-06 ENCOUNTER — HOSPITAL ENCOUNTER (OUTPATIENT)
Dept: CT IMAGING | Facility: HOSPITAL | Age: 65
Discharge: HOME | End: 2025-02-06
Admitting: SURGERY
Payer: MEDICARE

## 2025-02-06 DIAGNOSIS — K66.8 CALCIFIED MESENTERIC MASS: ICD-10-CM

## 2025-02-06 LAB — CREAT BLDA-MCNC: 1.3 MG/DL (ref 0.6–1.3)

## 2025-02-06 PROCEDURE — 82565 ASSAY OF CREATININE: CPT

## 2025-02-06 PROCEDURE — 25510000001 IOPAMIDOL 61 % SOLUTION: Performed by: SURGERY

## 2025-02-06 PROCEDURE — 74178 CT ABD&PLV WO CNTR FLWD CNTR: CPT

## 2025-02-06 RX ORDER — IOPAMIDOL 612 MG/ML
100 INJECTION, SOLUTION INTRAVASCULAR
Status: COMPLETED | OUTPATIENT
Start: 2025-02-06 | End: 2025-02-06

## 2025-02-06 RX ADMIN — IOPAMIDOL 85 ML: 612 INJECTION, SOLUTION INTRAVENOUS at 09:40

## 2025-02-19 PROCEDURE — 93295 DEV INTERROG REMOTE 1/2/MLT: CPT | Performed by: STUDENT IN AN ORGANIZED HEALTH CARE EDUCATION/TRAINING PROGRAM

## 2025-02-19 PROCEDURE — 93296 REM INTERROG EVL PM/IDS: CPT | Performed by: STUDENT IN AN ORGANIZED HEALTH CARE EDUCATION/TRAINING PROGRAM

## 2025-02-27 ENCOUNTER — TELEMEDICINE (OUTPATIENT)
Dept: SLEEP MEDICINE | Age: 65
End: 2025-02-27
Payer: MEDICARE

## 2025-02-27 VITALS — HEIGHT: 73 IN | BODY MASS INDEX: 33.8 KG/M2 | WEIGHT: 255 LBS

## 2025-02-27 DIAGNOSIS — E66.811 OBESITY (BMI 30.0-34.9): ICD-10-CM

## 2025-02-27 DIAGNOSIS — G47.33 OBSTRUCTIVE SLEEP APNEA, ADULT: Primary | ICD-10-CM

## 2025-05-29 ENCOUNTER — TRANSCRIBE ORDERS (OUTPATIENT)
Dept: ADMINISTRATIVE | Facility: HOSPITAL | Age: 65
End: 2025-05-29
Payer: MEDICARE

## 2025-05-29 DIAGNOSIS — N64.4 MASTODYNIA: Primary | ICD-10-CM

## 2025-05-30 ENCOUNTER — HOSPITAL ENCOUNTER (OUTPATIENT)
Dept: ULTRASOUND IMAGING | Facility: HOSPITAL | Age: 65
Discharge: HOME | End: 2025-05-30
Payer: MEDICARE

## 2025-05-30 ENCOUNTER — HOSPITAL ENCOUNTER (OUTPATIENT)
Dept: MAMMOGRAPHY | Facility: HOSPITAL | Age: 65
Discharge: HOME | End: 2025-05-30
Payer: MEDICARE

## 2025-05-30 DIAGNOSIS — N64.4 MASTODYNIA: ICD-10-CM

## 2025-05-30 LAB — NCCN CRITERIA FLAG: NORMAL

## 2025-05-30 PROCEDURE — 76642 ULTRASOUND BREAST LIMITED: CPT

## 2025-05-30 PROCEDURE — 77066 DX MAMMO INCL CAD BI: CPT

## 2025-06-25 RX ORDER — APIXABAN 5 MG/1
5 TABLET, FILM COATED ORAL EVERY 12 HOURS SCHEDULED
Qty: 180 TABLET | Refills: 3 | Status: SHIPPED | OUTPATIENT
Start: 2025-06-25

## 2025-08-05 ENCOUNTER — OFFICE VISIT (OUTPATIENT)
Dept: CARDIOLOGY | Facility: CLINIC | Age: 65
End: 2025-08-05
Payer: MEDICARE

## 2025-08-05 VITALS
OXYGEN SATURATION: 95 % | BODY MASS INDEX: 33.93 KG/M2 | SYSTOLIC BLOOD PRESSURE: 130 MMHG | HEIGHT: 73 IN | DIASTOLIC BLOOD PRESSURE: 86 MMHG | HEART RATE: 68 BPM | WEIGHT: 256 LBS

## 2025-08-05 DIAGNOSIS — Z95.810 BIVENTRICULAR IMPLANTABLE CARDIOVERTER-DEFIBRILLATOR (ICD) IN SITU: Primary | Chronic | ICD-10-CM

## 2025-08-05 DIAGNOSIS — I47.20 VT (VENTRICULAR TACHYCARDIA): ICD-10-CM

## 2025-08-05 DIAGNOSIS — I10 PRIMARY HYPERTENSION: Chronic | ICD-10-CM

## 2025-08-05 DIAGNOSIS — I48.0 PAROXYSMAL ATRIAL FIBRILLATION: ICD-10-CM

## 2025-08-21 ENCOUNTER — OFFICE VISIT (OUTPATIENT)
Dept: CARDIOLOGY | Facility: CLINIC | Age: 65
End: 2025-08-21
Payer: MEDICARE

## 2025-08-21 VITALS
WEIGHT: 254.4 LBS | HEART RATE: 69 BPM | HEIGHT: 73 IN | OXYGEN SATURATION: 95 % | BODY MASS INDEX: 33.72 KG/M2 | DIASTOLIC BLOOD PRESSURE: 72 MMHG | SYSTOLIC BLOOD PRESSURE: 118 MMHG

## 2025-08-21 DIAGNOSIS — I25.10 CORONARY ARTERIOSCLEROSIS IN NATIVE ARTERY: ICD-10-CM

## 2025-08-21 DIAGNOSIS — I10 PRIMARY HYPERTENSION: Primary | Chronic | ICD-10-CM

## (undated) DEVICE — PK CYSTO-TUR BASIC 10

## (undated) DEVICE — IRRIGATOR BULB ASEPTO 60CC STRL

## (undated) DEVICE — ADULT, W/LG. BACK PAD, RADIOTRANSPARENT ELEMENT AND LEAD WIRE: Brand: DEFIBRILLATION ELECTRODES

## (undated) DEVICE — NDL PERC 1PART ECHOTIP WO/BASEPLT 18G 7CM

## (undated) DEVICE — INTRO TEAR AWAY/LVD W/SD PRT 9F 13CM

## (undated) DEVICE — CVR PROB ULTRASND/TRANSD W/GEL 7X11IN STRL

## (undated) DEVICE — GUIDE CATHETER: Brand: MACH1™

## (undated) DEVICE — DEV INFL MONARCH 25W

## (undated) DEVICE — DRSNG SURG AQUACEL AG/ADVNTGE 9X15CM 3.5X6IN

## (undated) DEVICE — DEFOGGER!" ANTI FOG KIT: Brand: DEROYAL

## (undated) DEVICE — DECANT BG O JET

## (undated) DEVICE — ST EXT IV SMRTSTE 2VLV FIX M LL 6ML 41

## (undated) DEVICE — GLV SURG SENSICARE PI MIC PF SZ8 LF STRL

## (undated) DEVICE — ST INF PRI SMRTSTE 20DRP 2VLV 24ML 117

## (undated) DEVICE — SET PRIMARY GRVTY 10DP MALE LL 104IN

## (undated) DEVICE — CORD BOVIE 1P/U

## (undated) DEVICE — ELECTRD RETRN/GRND MEGADYNE SGL/PLT W/CORD 9FT DISP

## (undated) DEVICE — NC TREK NEO™ CORONARY DILATATION CATHETER 2.50 X 12 MM / RAPID-EXCHANGE: Brand: NC TREK NEO™

## (undated) DEVICE — ADULT, W/LG. BACK PAD, RADIOTRANSPARENT ELEMENT AND LEAD WIRE COMPATIBLE W/: Brand: DEFIBRILLATION ELECTRODES

## (undated) DEVICE — NC TREK NEO™ CORONARY DILATATION CATHETER 2.50 X 15 MM / RAPID-EXCHANGE: Brand: NC TREK NEO™

## (undated) DEVICE — NC TREK NEO™ CORONARY DILATATION CATHETER 3.50 MM X 12 MM / RAPID-EXCHANGE: Brand: NC TREK NEO™

## (undated) DEVICE — CAUTERY TIP POLISHER: Brand: DEVON

## (undated) DEVICE — GUIDELINER CATHETERS ARE INTENDED TO BE USED IN CONJUNCTION WITH GUIDE CATHETERS TO ACCESS DISCRETE REGIONS OF THE CORONARY AND/OR PERIPHERAL VASCULATURE, AND TO FACILITATE PLACEMENT OF INTERVENTIONAL DEVICES.: Brand: GUIDELINER® V3 CATHETER

## (undated) DEVICE — CATH DIAG EXPO M/ PK 5F FL4/FR4 PIG

## (undated) DEVICE — ST PRIM GRVTY NDLESS 3 INJ PORT 105IN

## (undated) DEVICE — GLIDESHEATH SLENDER STAINLESS STEEL KIT: Brand: GLIDESHEATH SLENDER

## (undated) DEVICE — BLANKT WARM UPPR/BDY ARM/OUT 57X196CM

## (undated) DEVICE — PK CATH CARD 10

## (undated) DEVICE — MINI TREK CORONARY DILATATION CATHETER 1.50 MM X 12 MM / RAPID-EXCHANGE: Brand: MINI TREK

## (undated) DEVICE — MODEL AT P65, P/N 701554-001KIT CONTENTS: HAND CONTROLLER, 3-WAY HIGH-PRESSURE STOPCOCK WITH ROTATING END AND PREMIUM HIGH-PRESSURE TUBING: Brand: ANGIOTOUCH® KIT

## (undated) DEVICE — PLASMABLADE PS210-030S 3.0S LOCK: Brand: PLASMABLADE™

## (undated) DEVICE — TUBING, SUCTION, 1/4" X 10', STRAIGHT: Brand: MEDLINE

## (undated) DEVICE — MEDI-VAC YANKAUER SUCTION HANDLE W/BULBOUS TIP: Brand: CARDINAL HEALTH

## (undated) DEVICE — LIMB HOLDER, WRIST/ANKLE: Brand: DEROYAL

## (undated) DEVICE — Device: Brand: ASAHI SION BLUE

## (undated) DEVICE — NC TREK NEO™ CORONARY DILATATION CATHETER 3.00 MM X 12 MM / RAPID-EXCHANGE: Brand: NC TREK NEO™

## (undated) DEVICE — DRAINBAG,ANTI-REFLUX TOWER,L/F,2000ML,LL: Brand: MEDLINE

## (undated) DEVICE — NC TREK NEO™ CORONARY DILATATION CATHETER 4.00 MM X 12 MM / RAPID-EXCHANGE: Brand: NC TREK NEO™

## (undated) DEVICE — CATH FOL BARDEX 2WY 20F 30CC

## (undated) DEVICE — PATIENT RETURN ELECTRODE, SINGLE-USE, CONTACT QUALITY MONITORING, ADULT, WITH 9FT CORD, FOR PATIENTS WEIGING OVER 33LBS. (15KG): Brand: MEGADYNE

## (undated) DEVICE — MODEL BT2000 P/N 700287-012KIT CONTENTS: MANIFOLD WITH SALINE AND CONTRAST PORTS, SALINE TUBING WITH SPIKE AND HAND SYRINGE, TRANSDUCER: Brand: BT2000 AUTOMATED MANIFOLD KIT

## (undated) DEVICE — GLV SURG SENSICARE W/ALOE PF LF 8 STRL

## (undated) DEVICE — GW PERIPH GUIDERIGHT STD/EXCHNG/J/TIP SS 0.035IN 5X260CM

## (undated) DEVICE — Device

## (undated) DEVICE — DEV COMPR RADL PRELUDESYNCEZ 30ML 32CM

## (undated) DEVICE — CANN NASL CO2 DIVIDED A/

## (undated) DEVICE — LEX ELECTRO PHYSIOLOGY: Brand: MEDLINE INDUSTRIES, INC.

## (undated) DEVICE — KT CATH IMG DRAGONFLY/OPSTAR 2.7F 135CM

## (undated) DEVICE — MINI TREK CORONARY DILATATION CATHETER 2.0 MM X 12 MM / RAPID-EXCHANGE: Brand: MINI TREK

## (undated) DEVICE — CATH DIAG EXPO .045 FL3  5F 100CM

## (undated) DEVICE — SOL NACL 0.9PCT 1000ML

## (undated) DEVICE — CATH BALN LITHO INTRAVASC CORNRY 6F 138CM 3X12MM